# Patient Record
Sex: FEMALE | Race: WHITE | NOT HISPANIC OR LATINO | Employment: UNEMPLOYED | URBAN - METROPOLITAN AREA
[De-identification: names, ages, dates, MRNs, and addresses within clinical notes are randomized per-mention and may not be internally consistent; named-entity substitution may affect disease eponyms.]

---

## 2017-01-04 ENCOUNTER — ALLSCRIPTS OFFICE VISIT (OUTPATIENT)
Dept: OTHER | Facility: OTHER | Age: 47
End: 2017-01-04

## 2017-01-09 ENCOUNTER — ALLSCRIPTS OFFICE VISIT (OUTPATIENT)
Dept: OTHER | Facility: OTHER | Age: 47
End: 2017-01-09

## 2017-01-09 ENCOUNTER — TRANSCRIBE ORDERS (OUTPATIENT)
Dept: ADMINISTRATIVE | Facility: HOSPITAL | Age: 47
End: 2017-01-09

## 2017-01-09 DIAGNOSIS — R26.89 BALANCE PROBLEM: Primary | ICD-10-CM

## 2017-01-17 ENCOUNTER — GENERIC CONVERSION - ENCOUNTER (OUTPATIENT)
Dept: OTHER | Facility: OTHER | Age: 47
End: 2017-01-17

## 2017-01-27 ENCOUNTER — GENERIC CONVERSION - ENCOUNTER (OUTPATIENT)
Dept: OTHER | Facility: OTHER | Age: 47
End: 2017-01-27

## 2017-01-27 ENCOUNTER — HOSPITAL ENCOUNTER (OUTPATIENT)
Dept: MRI IMAGING | Facility: HOSPITAL | Age: 47
Discharge: HOME | End: 2017-01-27
Attending: PSYCHIATRY & NEUROLOGY
Payer: COMMERCIAL

## 2017-01-27 DIAGNOSIS — R26.89 OTHER ABNORMALITIES OF GAIT AND MOBILITY: ICD-10-CM

## 2017-01-27 PROCEDURE — 72156 MRI NECK SPINE W/O & W/DYE: CPT

## 2017-01-27 PROCEDURE — 72157 MRI CHEST SPINE W/O & W/DYE: CPT

## 2017-01-27 PROCEDURE — A9585 GADOBUTROL INJECTION: HCPCS | Performed by: PSYCHIATRY & NEUROLOGY

## 2017-01-27 RX ADMIN — GADOBUTROL 8 ML: 604.72 INJECTION INTRAVENOUS at 11:26

## 2017-02-10 ENCOUNTER — GENERIC CONVERSION - ENCOUNTER (OUTPATIENT)
Dept: OTHER | Facility: OTHER | Age: 47
End: 2017-02-10

## 2017-02-21 ENCOUNTER — GENERIC CONVERSION - ENCOUNTER (OUTPATIENT)
Dept: OTHER | Facility: OTHER | Age: 47
End: 2017-02-21

## 2017-02-22 DIAGNOSIS — R26.89 OTHER ABNORMALITIES OF GAIT AND MOBILITY: ICD-10-CM

## 2017-02-22 DIAGNOSIS — G43.709 CHRONIC MIGRAINE WITHOUT AURA WITHOUT STATUS MIGRAINOSUS, NOT INTRACTABLE: ICD-10-CM

## 2017-03-09 ENCOUNTER — ALLSCRIPTS OFFICE VISIT (OUTPATIENT)
Dept: OTHER | Facility: OTHER | Age: 47
End: 2017-03-09

## 2017-03-15 ENCOUNTER — ALLSCRIPTS OFFICE VISIT (OUTPATIENT)
Dept: OTHER | Facility: OTHER | Age: 47
End: 2017-03-15

## 2017-03-16 ENCOUNTER — ALLSCRIPTS OFFICE VISIT (OUTPATIENT)
Dept: OTHER | Facility: OTHER | Age: 47
End: 2017-03-16

## 2017-03-16 ENCOUNTER — GENERIC CONVERSION - ENCOUNTER (OUTPATIENT)
Dept: OTHER | Facility: OTHER | Age: 47
End: 2017-03-16

## 2017-03-17 LAB
ERYTHROCYTE SEDIMENTATION RATE (HISTORICAL): 18 MM/HR (ref 0–32)
Lab: 15.2 AU/ML (ref 0–1.1)

## 2017-03-20 ENCOUNTER — GENERIC CONVERSION - ENCOUNTER (OUTPATIENT)
Dept: OTHER | Facility: OTHER | Age: 47
End: 2017-03-20

## 2017-04-14 ENCOUNTER — ALLSCRIPTS OFFICE VISIT (OUTPATIENT)
Dept: OTHER | Facility: OTHER | Age: 47
End: 2017-04-14

## 2017-04-14 DIAGNOSIS — Z12.31 ENCOUNTER FOR SCREENING MAMMOGRAM FOR MALIGNANT NEOPLASM OF BREAST: ICD-10-CM

## 2017-04-20 ENCOUNTER — ALLSCRIPTS OFFICE VISIT (OUTPATIENT)
Dept: OTHER | Facility: OTHER | Age: 47
End: 2017-04-20

## 2017-05-10 ENCOUNTER — GENERIC CONVERSION - ENCOUNTER (OUTPATIENT)
Dept: OTHER | Facility: OTHER | Age: 47
End: 2017-05-10

## 2017-05-19 ENCOUNTER — ALLSCRIPTS OFFICE VISIT (OUTPATIENT)
Dept: OTHER | Facility: OTHER | Age: 47
End: 2017-05-19

## 2017-07-11 ENCOUNTER — TRANSCRIBE ORDERS (OUTPATIENT)
Dept: ADMINISTRATIVE | Facility: HOSPITAL | Age: 47
End: 2017-07-11

## 2017-07-11 ENCOUNTER — ALLSCRIPTS OFFICE VISIT (OUTPATIENT)
Dept: OTHER | Facility: OTHER | Age: 47
End: 2017-07-11

## 2017-07-11 DIAGNOSIS — R90.89 ABNORMAL COMPUTERIZED TOMOGRAPHY OF BRAIN: Primary | ICD-10-CM

## 2017-07-11 DIAGNOSIS — R90.89 OTHER ABNORMAL FINDINGS ON DIAGNOSTIC IMAGING OF CENTRAL NERVOUS SYSTEM: ICD-10-CM

## 2017-07-13 ENCOUNTER — GENERIC CONVERSION - ENCOUNTER (OUTPATIENT)
Dept: OTHER | Facility: OTHER | Age: 47
End: 2017-07-13

## 2017-07-13 LAB
BUN SERPL-MCNC: 8 MG/DL (ref 6–24)
BUN/CREA RATIO (HISTORICAL): 11 (ref 9–23)
CREAT SERPL-MCNC: 0.73 MG/DL (ref 0.57–1)
EGFR AFRICAN AMERICAN (HISTORICAL): 114 ML/MIN/1.73
EGFR-AMERICAN CALC (HISTORICAL): 99 ML/MIN/1.73

## 2017-07-14 ENCOUNTER — GENERIC CONVERSION - ENCOUNTER (OUTPATIENT)
Dept: OTHER | Facility: OTHER | Age: 47
End: 2017-07-14

## 2017-07-14 LAB
A/G RATIO (HISTORICAL): 1.4 (ref 0.7–1.7)
ALBUMIN SERPL BCP-MCNC: 3.8 G/DL (ref 2.9–4.4)
ALPHA 1 (HISTORICAL): 0.3 G/DL (ref 0–0.4)
ALPHA 2 (HISTORICAL): 0.8 G/DL (ref 0.4–1)
BACTERIA UR QL AUTO: NORMAL
BETA-2 MICROGLOBULIN (HISTORICAL): 0.9 G/DL (ref 0.7–1.3)
BILIRUB UR QL STRIP: NEGATIVE
COLOR UR: YELLOW
COMMENT (HISTORICAL): CLEAR
FECAL OCCULT BLOOD DIAGNOSTIC (HISTORICAL): NEGATIVE
GAMMA GLOBULIN (HISTORICAL): 0.9 G/DL (ref 0.4–1.8)
GAMMAGLOBULIN; IGM (HISTORICAL): 51 MG/DL (ref 26–217)
GLUCOSE (HISTORICAL): NEGATIVE
IGA (HISTORICAL): 113 MG/DL (ref 87–352)
IMMUNOFIXATION ELECTROPHORESIS (HISTORICAL): NORMAL
KETONES UR STRIP-MCNC: NEGATIVE MG/DL
LEUKOCYTE ESTERASE UR QL STRIP: NEGATIVE
M-SPIKE, SERUM (HISTORICAL): NORMAL G/DL
MICROSCOPIC EXAMINATION (HISTORICAL): NORMAL
MICROSCOPIC EXAMINATION (HISTORICAL): NORMAL
NITRITE UR QL STRIP: NEGATIVE
NON-SQ EPI CELLS URNS QL MICRO: NORMAL /HPF
PH UR STRIP.AUTO: 7 [PH] (ref 5–7.5)
PLEASE NOTE: (HISTORICAL): NORMAL
PROT UR STRIP-MCNC: NEGATIVE MG/DL
RBC (HISTORICAL): NORMAL /HPF
SP GR UR STRIP.AUTO: 1.01 (ref 1–1.03)
TOT. GLOBULIN, SERUM (HISTORICAL): 2.9 G/DL (ref 2.2–3.9)
TOTAL IGG (HISTORICAL): 892 MG/DL (ref 700–1600)
TOTAL PROTEIN (HISTORICAL): 6.7 G/DL (ref 6–8.5)
URINALYSIS (UA) (HISTORICAL): NORMAL
UROBILINOGEN UR QL STRIP.AUTO: 0.2 EU/DL (ref 0.2–1)
WBC # BLD AUTO: NORMAL /HPF

## 2017-07-16 ENCOUNTER — GENERIC CONVERSION - ENCOUNTER (OUTPATIENT)
Dept: OTHER | Facility: OTHER | Age: 47
End: 2017-07-16

## 2017-07-24 ENCOUNTER — GENERIC CONVERSION - ENCOUNTER (OUTPATIENT)
Dept: OTHER | Facility: OTHER | Age: 47
End: 2017-07-24

## 2017-07-26 ENCOUNTER — HOSPITAL ENCOUNTER (OUTPATIENT)
Dept: MRI IMAGING | Facility: HOSPITAL | Age: 47
Discharge: HOME | End: 2017-07-26
Attending: PSYCHIATRY & NEUROLOGY
Payer: COMMERCIAL

## 2017-07-26 DIAGNOSIS — R90.89 OTHER ABNORMAL FINDINGS ON DIAGNOSTIC IMAGING OF CENTRAL NERVOUS SYSTEM: ICD-10-CM

## 2017-07-26 PROCEDURE — A9585 GADOBUTROL INJECTION: HCPCS | Performed by: PSYCHIATRY & NEUROLOGY

## 2017-07-26 PROCEDURE — 70553 MRI BRAIN STEM W/O & W/DYE: CPT

## 2017-07-26 RX ADMIN — GADOBUTROL 9 ML: 604.72 INJECTION INTRAVENOUS at 08:40

## 2017-07-27 ENCOUNTER — GENERIC CONVERSION - ENCOUNTER (OUTPATIENT)
Dept: OTHER | Facility: OTHER | Age: 47
End: 2017-07-27

## 2017-12-05 ENCOUNTER — GENERIC CONVERSION - ENCOUNTER (OUTPATIENT)
Dept: OTHER | Facility: OTHER | Age: 47
End: 2017-12-05

## 2017-12-26 ENCOUNTER — ALLSCRIPTS OFFICE VISIT (OUTPATIENT)
Dept: OTHER | Facility: OTHER | Age: 47
End: 2017-12-26

## 2017-12-26 ENCOUNTER — TRANSCRIBE ORDERS (OUTPATIENT)
Dept: ADMINISTRATIVE | Facility: HOSPITAL | Age: 47
End: 2017-12-26

## 2017-12-26 DIAGNOSIS — H53.8 OTHER VISUAL DISTURBANCES (CODE): ICD-10-CM

## 2017-12-26 DIAGNOSIS — T88.7XXA DRUG-INDUCED DISORDER OF REFRACTION AND ACCOMMODATION: ICD-10-CM

## 2017-12-26 DIAGNOSIS — R29.898 DEFICIENCIES OF LIMBS: Primary | ICD-10-CM

## 2017-12-26 DIAGNOSIS — H53.8 DRUG-INDUCED DISORDER OF REFRACTION AND ACCOMMODATION: ICD-10-CM

## 2017-12-27 NOTE — PROGRESS NOTES
Assessment   1  Bilateral leg weakness (729 89) (R29 898)  2  Migraine without aura and without status migrainosus, not intractable (346 10) (G43 009)  3  Visual blurriness (368 8) (H53 8)  4  Lumbar spondylosis (721 3) (M47 816)  5  Rheumatoid arthritis (714 0) (M06 9)  6  Pernicious anemia (281 0) (D51 0)    Plan   Bilateral leg weakness    · EMG TWO EXTREMITIES WITH OR W/O RELATED PARASPINAL AREAS; Status:Active; Requested for:40Vfv2529;   Perform:[facility]; Due:97Cni1186; Last Updated By:Barbara Adhikari;     12/26/2017 8:43:16 AM;Ordered; For:Bilateral leg weakness; Ordered By:Miriam Harris;  EXTREMITY TWO : RLE   EXTREMITY ONE : LLE  Visual blurriness    · Start: Butalbital-APAP-Caffeine -40 MG Oral Capsule; TAKE 1 CAPSULE EVERY 4    HOURS AS NEEDED  Rx By: Lana Gold; Dispense: 30 Days ; #:12 Capsule; Refill: 1;For: Visual     blurriness; DONNA = N; Faxed To: [facility]/PHARMACY #[id number]  · Start: Propranolol HCl - 10 MG Oral Tablet; TAKE 1 TABLET TWICE DAILY  Rx By: Lana Gold; Dispense: 30 Days ; #:60 Tablet; Refill: 3;For: Visual     blurriness; DONNA = N; Faxed To: [facility]/PHARMACY #[id number]   · * MRI BRAIN MS WO AND W CONTRAST; Status:Need Information - Financial    Authorization; Requested for:[id number];   Perform:[facility]; Order Comments:JULY 2018; WXU:65TYE5056; Last     Updated By:Barbara Adhikari; 12/26/2017 8:42:18 AM;Ordered; For:Visual blurriness;     Ordered By:Miriam Harris;   · VAS TEMPORAL ARTERY DUPLEX; SIDE:Bilateral; Status:Active; Requested    for:99Cak0056;   Perform:[facility]; Due:96Vnl3144; Last Updated By:Barbara Adhikari;     12/26/2017 8:44:45 AM;Ordered; For:Visual blurriness; Ordered By:Christen Harris;   · 1 - Willis Peralta Physician Assistant Co-Management  1-2 MONTHS     Status: Active  Requested for: 44EEN3481  Ordered; For: Visual blurriness;  Ordered By: Lana Gold  Performed:   Due: 03WYK6429; Last Updated By: Tanisha Che; 12/26/2017 8:42:44 AM  () Care Summary provided  : Yes   · Follow-up Visit in 8 Months Evaluation and Treatment  Follow-up  Status: Complete     Done: 74JWW7772  Ordered; For: Visual blurriness;  Ordered By: Yunier Rizvi  Performed:   Due:     86VAR8329; Last Updated By: Tanisha Che; 12/26/2017 8:44:09 AM    Discussion/Summary   Mrs Radha Smith has presented for follow up on worsening headaches, lower extremities weakness, word finding difficulties  Patient has started prednisone therapy for RA and she has been completing work up for Sjogren's disease, with known pernicious anemia and Factor V Leiden  Patient complaint about generalized fatigue- due to multiple autoimmune disorders  In regards of non-specific white matter findings - patient has MRI brain in July 2017 - no new findings, no demyelination in her C/T spine either  We will have one more MRI in July 2018 with no other tests recommended  Patient has worsening headaches with known history of Migraines- will restart propranolol, and provide Fioricet for abortive therapy  Patient had allergic reaction  to effexor previously  Follow up with Ms Cordon recommended  We will proceed with Vascular study for temporal artery - transient vision blurriness and swelling of left temporal area- would rule out GCA, but no permanent vision loss  Patient has been currently on prednisone  Lower extremities weakness on and off with advanced lumbar arthritis - patient will have EMG study, as she has been following with  pain management and rhizotomy was recommended as her next step  Follow up in July 2018 after imaging completed or earlier if new focal neurologic deficit described      Counseling Documentation With Imm: Greater than 50% of the 40 minutes evaluation was a face-to-face discussion regarding  the pathophysiology of her current symptoms and further plan, as well as counseling, educating, and coordinating the patientâs care  Chief Complaint   Chief Complaint Free Text Note Form: Patient is here today for a follow up to an abnormal [facility] MRI      History of Present Illness   Mrs Anderson Lozoya has a history of 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, Difficulty swallowing, left cervical radiculopathy, left shoulder replacement, lumbar spondylosis, Factor V Leiden syndrome, Factor VIII deficiency, Fever of unknown origin, Pernicious anemia, RA, atrophic gastritis, who presented for Follow-up On her body twitching, fatigue and transient lower extremities weakness  Patient presented with her   Since last office visit, patient described no new focal neurologic deficit  She stated that her headaches returned, no aura, but she has transient left temporal pain and swelling  At times patient described double vision or blurriness  Patient has been started prednisone 20 mg daily for multiple autoimmune pathologies  She has ongoing work up for questionable Sjogren's disease  Patient has transient lower extremities weakness with bladder dysfunction - we agreed that she has no signs of MS and no lesions in her thoracic spine to explain her findings  She is to follow with her pain management team and she started that they would decide if further imaging recommended, as per the patient  Review of Systems   Neurological ROS:      Constitutional:1  recent weight gain1 -- and-- fatigue1   HEENT:1  blurred vision1 ,-- dryness of the eyes1 ,-- hearing loss1 ,-- tinnitus1 -- and-- dysphagia1   Cardiovascular:1  rapid or irregular heart rate1   Respiratory:1   No unusual or persistant cough, no shortness of breath with or without exertion1   Gastrointestinal:1  nausea1 ,-- vomiting1 -- and-- abdominal pain1   Genitourinary:1  feelings of urinary urgency1 -- and-- UTI1         Musculoskeletal:1  arthralgias1 ,-- WWTLWIAT0 ,-- immobility or loss of function1 ,-- head/neck/back pain1 -- and-- pain while walking1   Integumentary1   No masses, no rash, no skin lesions, no livedo reticularis1   Psychiatric:1  anxiety1   Endocrine1   No unusual weight loss or gain, no excessive urination, no excessive thirst, no hair loss or gain, no hot or cold intolerance, no menstrual period change or irregularity, no loss of sexual ability or drive, no erection difficulty, no nipple discharge1   Hematologic/Lymphatic: unusual bleeding1 -- and-- a tendency for easy bruising1   Neurological General:1  headache1 ,-- trouble falling asleep1 ,-- snoring1 -- and-- waking up at night1   Neurological Mental Status:1  confusion1 -- and-- memory problems1   Neurological Cranial Nerves:1  slurred speech1   Neurological Motor findings include:1  twitching1   Neurological Coordination:1  balance difficulties1 -- and-- clumsiness1   Neurological Sensory:1  numbness1 -- and-- tingling1   Neurological Gait:1   No difficulty walking, not falling to one side, no sensation of being pushed, has not had falls1   ROS Reviewed:    ROS reviewed  1 Amended By: Collette Sofia; Dec 26 2017 10:37 AM EST      Active Problems   1  Abnormal brain MRI (793 0) (R90 89)  2  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)  3  Balance disorder (781 99) (R26 89)  4  Chronic atrophic gastritis (535 10) (K29 40)  5  Chronic atrophic gastritis (535 10) (K29 40)  6  Chronic migraine without aura (346 70) (G43 709)  7  Depression (311) (F32 9)  8  Easy bruisability (782 9) (R23 8)  9  Encounter for gynecological examination with abnormal finding (V72 31) (Z01 411)  10  Encounter for gynecological examination without abnormal finding (V72 31) (Z01 419)  11  Encounter for routine gynecological examination (V72 31) (Z01 419)  12  Encounter for screening mammogram for malignant neoplasm of breast (V76 12)      (Z12 31)  13   Factor V Leiden mutation (289 81) (D68 51)  14  Frontal lobe and executive function deficit (799 55) (R41 844)  15  Mild cognitive impairment (331 83) (G31 84)  16  Osteoarthritis of spine with radiculopathy, cervical region (721 0) (M47 22)  17  Ovarian cyst (620 2) (N83 20)  18  Paresthesia of both lower extremities (782 0) (R20 2)  19  Pelvic and perineal pain (625 9) (R10 2)  20  Platelet dysfunction (287 1) (D69 1)  21  Posttraumatic stress disorder (309 81) (F43 10)  22  Vitamin B12 deficiency (266 2) (E53 8)  23  Vulvovaginitis candida albicans (112 1) (B37 3)    Past Medical History   1  History of Avascular necrosis (733 40) (M87 00)  2  Depression (311) (F32 9)  3  History of menorrhagia (V13 29) (Z87 42)  4  History of migraine (V12 49) (Z86 69)  5  History of peripheral neuropathy (V12 49) (Z86 69)  6  History of Lichen sclerosus (990 3) (L90 0)  7  Ovarian cyst (620 2) (N83 20)  8  History of Pelvic pain (R10 2)  9  History of Periodic limb movement disorder (327 51) (G47 61)  10  Personal history of endometriosis (V13 29) (Z87 42)  Active Problems And Past Medical History Reviewed: The active problems and past medical history were reviewed and updated today  Surgical History   1  History of Appendectomy  2  History of Biopsy Endometrial, Without Cervical Dilation  3  History of  Section  4  History of Dilation And Curettage  5  History of Exploratory Laparoscopy  6  History of Hysterectomy  7  History of Hysteroscopy  8  History of Ovarian Cystectomy  9  History of Salpingectomy  10  History of Tubal Ligation  Surgical History Reviewed: The surgical history was reviewed and updated today  Family History   Mother   1  Family history of arthritis (V17 7) (Z82 61)  2  Family history of diabetes mellitus (V18 0) (Z83 3)  3  Family history of factor V Leiden deficiency (V18 3) (Z83 2)  4  Family history of migraine headaches (V17 2) (Z82 0)  5  Family history of non-Hodgkin's lymphoma (V16 7) (Z80 7)  6  Family history of thyroid disease (V18 19) (Z83 49)  7  Family history of Hypertension, benign  8  Family history of Rheumatic disease  Father   5  Family history of Motor vehicle accident  Brother   8  Family history of Rheumatic disease  Family History Reviewed: The family history was reviewed and updated today  Social History    · Current Every Day Smoker (305 1)  Social History Reviewed: The social history was reviewed and updated today  The social history was reviewed and is unchanged  Current Meds   1  Chantix 1 MG Oral Tablet; TAKE 1 TABLET TWICE DAILY; Therapy: (Recorded:28Sea0139) to Recorded  2  Nucynta 100 MG Oral Tablet; 1 tablet prn; Therapy: (Recorded:25Gjo1722) to Recorded  3  PredniSONE 20 MG Oral Tablet; TAKE 1 TABLET TWICE DAILY; Therapy: (Recorded:41Aph8069) to Recorded  4  TraZODone HCl - 100 MG Oral Tablet; 1 TABLET 3 TIMES A DAY; Therapy: (Recorded:86Gqn3324) to Recorded  Medication List Reviewed: The medication list was reviewed and updated today  Allergies   1  Morphine Sulfate TABS  2  Macrobid CAPS  3  Adhesive Tape TAPE    Physical Exam   CONSTITUTIONAL: NAD, pleasant  NECK: supple, no lymphadenopathy, no thyromegaly, no JVD  CARDIOVASCULAR: RRR, normal S1S2, no murmurs, no rubs  RESP: clear to auscultation bilaterally, no wheezes/rhonchi/rales  ABDOMEN: soft, non tender, non distended  SKIN: no rash or skin lesions  EXTREMITIES: no edema, pulses 2+bilaterally  PSYCH: appropriate mood and affect     NEUROLOGIC COMPREHENSIVE EXAM: Patient is oriented to person, place and time, NAD; appropriate affect  CN II, III, IV, V, VI, VII,VIII,IX,X,XI-XII intact with EOMI, PERRLA, OKN intact, VF grossly intact, fundi poorly visualized secondary to pupillary constriction; symmetric face noted  Motor: 5/5 UE/LE bilateral symmetric; Sensory: intact to light touch and pinprick bilaterally; normal vibration sensation feet bilaterally;  Coordination within normal limits on FTN and MARSHA testing; DTR: 2/4 through, no Babinski, no clonus  Tandem gait is intact  Romberg: negative           Future Appointments      Date/Time Provider Specialty Site   03/08/2018 11:15 AM Dianna Dyer [facility] 176     Signatures    Electronically signed by : KARIS Brice ; Dec 26 2017  8:48AM EST                       (Author)     Electronically signed by : KARIS Brice ; Dec 26 2017 11:02AM EST                       (Author)

## 2018-01-09 NOTE — RESULT NOTES
Verified Results  (1) FERRITIN [id number] 01:46PM Cierra Perez     Test Name Result Flag Reference   Ferritin, Serum 85 ng/mL       (1) IRON SATURATION %, TIBC [id number] 01:46PM Cierra Perez     Test Name Result Flag Reference   Iron Bind  Cap (TIBC) 276 ug/dL  250-450   UIBC 168 ug/dL  131-425   Iron, Serum 108 ug/dL     Iron Saturation 39 %  15-55     (LC) PT and PTT [id number] 01:46PM Cierra Perez     Test Name Result Flag Reference   INR 1 0  0 8-1 2   Reference interval is for non-anticoagulated patients  Suggested INR therapeutic range for Vitamin K                 antagonist therapy:                    Standard Dose (moderate intensity                                   therapeutic range):       2 0 - 3 0                    Higher intensity therapeutic range       2 5 - 3 5   Prothrombin Time 10 3 sec  9 1-12 0   aPTT 31 sec  24-33   This test has not been validated for monitoring unfractionated heparin  therapy  aPTT-based therapeutic ranges for unfractionated heparin  therapy have not been established  For general guidelines on  Heparin monitoring, refer to the [employer]  (1) CBC/PLT/DIFF [id number] 01:46PM Colonel Burkett     Test Name Result Flag Reference   WBC 9 2 x10E3/uL  3 4-10 8   RBC 4 77 x10E6/uL  3 77-5 28   Hemoglobin 15 1 g/dL  11 1-15 9   Hematocrit 43 8 %  34 0-46  6   MCV 92 fL  79-97   MCH 31 7 pg  26 6-33 0   MCHC 34 5 g/dL  31 5-35 7   RDW 12 9 %  12 3-15 4   Platelets 012 U01A2/SW  150-379   Neutrophils 58 %     Lymphs 36 %     Monocytes 4 %     Eos 2 %     Basos 0 %     Neutrophils (Absolute) 5 2 x10E3/uL  1 4-7 0   Lymphs (Absolute) 3 3 x10E3/uL H 0 7-3 1   Monocytes(Absolute) 0 4 x10E3/uL  0 1-0 9   Eos (Absolute) 0 2 x10E3/uL  0 0-0 4   Baso (Absolute) 0 0 x10E3/uL  0 0-0 2   Immature Granulocytes 0 %     Immature Grans (Abs) 0 0 x10E3/uL  0 0-0 1     (1) COMPREHENSIVE METABOLIC PANEL [id number] 01:46PM Shaneka Smiley April     Test Name Result Flag Reference   Glucose, Serum 105 mg/dL H 65-99   BUN 7 mg/dL  6-24   Creatinine, Serum 0 74 mg/dL  0 57-1 00   eGFR If NonAfricn Am 98 mL/min/1 73  >59   eGFR If Africn Am 113 mL/min/1 73  >59   BUN/Creatinine Ratio 9  9-23   Sodium, Serum 139 mmol/L  134-144   **Please note reference interval change**   Potassium, Serum 4 0 mmol/L  3 5-5 2   Chloride, Serum 101 mmol/L     **Please note reference interval change**   Carbon Dioxide, Total 20 mmol/L  18-29   Calcium, Serum 9 1 mg/dL  8 7-10 2   Protein, Total, Serum 6 5 g/dL  6 0-8 5   Albumin, Serum 4 3 g/dL  3 5-5 5   Globulin, Total 2 2 g/dL  1 5-4 5   A/G Ratio 2 0  1 1-2 5   Bilirubin, Total <0 2 mg/dL  0 0-1 2   Alkaline Phosphatase, S 81 IU/L     AST (SGOT) 14 IU/L  0-40   ALT (SGPT) 15 IU/L  0-32     (LC) Fibrinogen Antigen [id number] 01:46PM Cierra Smiley     Test Name Result Flag Reference   Fibrinogen Antigen 426 mg/dL H 180-350

## 2018-01-10 ENCOUNTER — GENERIC CONVERSION - ENCOUNTER (OUTPATIENT)
Dept: OTHER | Facility: OTHER | Age: 48
End: 2018-01-10

## 2018-01-10 ENCOUNTER — HOSPITAL ENCOUNTER (OUTPATIENT)
Dept: NON INVASIVE DIAGNOSTICS | Facility: CLINIC | Age: 48
Discharge: HOME | End: 2018-01-10
Payer: COMMERCIAL

## 2018-01-10 DIAGNOSIS — H53.8 OTHER VISUAL DISTURBANCES (CODE): ICD-10-CM

## 2018-01-10 PROCEDURE — 93882 EXTRACRANIAL UNI/LTD STUDY: CPT

## 2018-01-10 NOTE — MISCELLANEOUS
Message   Recorded as Task   Date: 11/10/2016 08:50 AM, Created By: Joaquín Jones   Task Name: Care Coordination   Assigned To: Modesto Rodríguez   Regarding Patient: Bucky De La Cruz, Status: In Progress   Kaylah Ohs - 10 Nov 2016 8:50 AM     TASK CREATED  Caller: Self; Care Coordination; [phone] (Home); [phone] (Work)  Pt saw her pain management doctor and they would like to give her an injection in her back  She was called to check with Dr Trupti Mcclain about whether or not she would need to have a PLT transfusion first  Please call her to discuss  [facility]: [phone]   Ania Jorgensen - 10 Nov 2016 8:57 AM     TASK IN PROGRESS   Ania Jorgensen - 10 Nov 2016 12:27 PM     TASK EDITED  d/w dr Caleb Clark  a platelet transfusion will be needed prior to injection  patient will let pain cneter know        Active Problems    1  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)   2  Depression (311) (F32 9)   3  Easy bruisability (782 9) (R23 8)   4  Encounter for gynecological examination with abnormal finding (V72 31) (Z01 411)   5  Encounter for routine gynecological examination (V72 31) (Z01 419)   6  Encounter for screening mammogram for malignant neoplasm of breast (V76 12)   (Z12 31)   7  Factor V Leiden mutation (289 81) (D68 51)   8  Ovarian cyst (620 2) (N83 20)   9  Pelvic and perineal pain (625 9) (R10 2)   10  Platelet dysfunction (287 1) (D69 1)   11  Vulvovaginitis candida albicans (112 1) (B37 3)    Current Meds   1  CeleBREX 200 MG Oral Capsule (Celecoxib); TAKE 1 CAPSULE DAILY; Therapy: (Recorded:25Mar2016) to Recorded   2  Effexor 37 5 MG TABS (Venlafaxine HCl); TAKE 1 TABLET DAILY; Therapy: (Recorded:25Mar2016) to Recorded   3  Nucynta 50 MG Oral Tablet; Therapy: (Recorded:25Mar2016) to Recorded   4  Wellbutrin  MG Oral Tablet Extended Release 12 Hour (BuPROPion HCl ER   (SR)); TAKE 1 TABLET DAILY; Therapy: (Recorded:25Mar2016) to Recorded    Allergies    1  Morphine Sulfate TABS   2  Macrobid CAPS   3   Adhesive Tape TAPE    Signatures   Electronically signed by : Francois Kahn, ; Nov 10 2016 12:27PM EST                       (Author)

## 2018-01-11 ENCOUNTER — TRANSCRIBE ORDERS (OUTPATIENT)
Dept: ADMINISTRATIVE | Facility: HOSPITAL | Age: 48
End: 2018-01-11

## 2018-01-11 DIAGNOSIS — R16.0 HEPATOMEGALY: Primary | ICD-10-CM

## 2018-01-11 NOTE — RESULT NOTES
Verified Results  (LC) Intrinsic Factor Abs, Serum [id number] 01:42PM Nicholas Thomson     Test Name Result Flag Reference   Intrinsic Factor Abs, Serum 15 2 AU/mL H 0 0-1 1

## 2018-01-11 NOTE — RESULT NOTES
Verified Results  * MRI CERVICAL SPINE [address] [id number] 10:03AM Fernando Sanchez Order Number: [id number]    - Patient Instructions: To schedule this appointment, please contact Central Scheduling at [phone]  Test Name Result Flag Reference   MRI CERVICAL SPINE [address] (Report)     This is a summary report  The complete report is available in the patient's medical record  If you cannot access the medical record, please contact the sending organization for a detailed fax or copy  MRI CERVICAL SPINE WITH AND WITHOUT CONTRAST     INDICATION: Gait abnormality  COMPARISON: Prior MRI July 11, 2004     TECHNIQUE: Sagittal T1, sagittal T2, sagittal inversion recovery, axial 2D merge and axial T2  Sagittal T1 and axial T1 postcontrast     IV Contrast: gadobutrol injection (MULTI-DOSE) SOLN 8 mL Note: (SINGLE DOSE/MULTI DOSE) information refers to the container from which the contrast was acquired  Contrast was injected one time intravenously without immediate complication  IMAGE QUALITY: Diagnostic  FINDINGS:     ALIGNMENT: Status post ACDF spanning C4-C6 with typical postoperative alignment  MARROW SIGNAL: Normal marrow signal is identified within the visualized bony structures  No discrete marrow lesion  CERVICAL AND VISUALIZED UPPER THORACIC CORD: Normal signal within the visualized cord  PREVERTEBRAL AND PARASPINAL SOFT TISSUES: Prevertebral and paraspinal soft tissues are unremarkable  VISUALIZED POSTERIOR FOSSA: The visualized posterior fossa demonstrates no abnormal signal      CERVICAL DISC SPACES:        C2-C3: Normal      C3-C4: Disc desiccation  Annular bulging with small marginal osteophytes results in minimal left foraminal narrowing, and minimal central stenosis without cord compression or cord signal abnormality  C4-C5: This level has been fused  No central or foraminal narrowing  C5-C6: This level has been fused   No central or foraminal narrowing  C6-C7: Small marginal osteophytes and minimal annular bulging result in minimal left foraminal narrowing  C7-T1: Normal      UPPER THORACIC DISC SPACES: Normal      POSTCONTRAST IMAGING: Normal        IMPRESSION:     Normal appearance of the cervical spinal cord  No abnormal enhancement identified  Typical postoperative appearance of ACDF spanning C4-C6  Degenerative changes at C4-5 noted where there is minimal central and left foraminal narrowing, as well as at    C6-7 where there is minimal left foraminal narrowing  Workstation performed: [id number]     Signed by:   Mary 6, DO   1/27/17     * MRI THORACIC SPINE W WO CONTRAST [id number] 10:03AM Melba Malik Order Number: [id number]    - Patient Instructions: To schedule this appointment, please contact Central Scheduling at [phone]  Test Name Result Flag Reference   MRI THORACIC SPINE [address] (Report)     This is a summary report  The complete report is available in the patient's medical record  If you cannot access the medical record, please contact the sending organization for a detailed fax or copy  MRI THORACIC SPINE WITH AND WITHOUT CONTRAST     INDICATION: Gait abnormality  Follow-up abnormal brain MRI  COMPARISON: None  TECHNIQUE: Sagittal T1, sagittal T2, sagittal inversion recovery, axial T2, coronal T2, axial 2D MERGE  Sagittal and axial T1 postcontrast      IV Contrast: gadobutrol injection (MULTI-DOSE) SOLN 8 mL Note: (SINGLE DOSE/MULTI DOSE) information refers to the container from which the contrast was acquired  Contrast was injected one time intravenously without immediate complication  IMAGE QUALITY: Diagnostic  FINDINGS:     ALIGNMENT: Levoscoliosis apex T3-4  MARROW SIGNAL: Normal marrow signal is identified within the visualized bony structures  No discrete marrow lesion  THORACIC CORD: Normal signal within the thoracic cord  PREVERTEBRAL AND PARASPINAL SOFT TISSUES: Prevertebral and paraspinal soft tissues are unremarkable  THORACIC DEGENERATIVE CHANGE: No disc herniation, canal stenosis or foraminal narrowing  No degenerative changes  POSTCONTRAST: No abnormal enhancement  IMPRESSION:     Normal appearance of the thoracic spinal cord  No significant central or foraminal narrowing         Workstation performed: [id number]     Signed by:   Ron Moreno DO   1/27/17

## 2018-01-12 VITALS
OXYGEN SATURATION: 97 % | HEIGHT: 67 IN | RESPIRATION RATE: 16 BRPM | SYSTOLIC BLOOD PRESSURE: 122 MMHG | HEART RATE: 79 BPM | DIASTOLIC BLOOD PRESSURE: 80 MMHG | WEIGHT: 194.38 LBS | TEMPERATURE: 98.2 F | BODY MASS INDEX: 30.51 KG/M2

## 2018-01-12 VITALS
RESPIRATION RATE: 16 BRPM | DIASTOLIC BLOOD PRESSURE: 80 MMHG | BODY MASS INDEX: 30.61 KG/M2 | HEART RATE: 107 BPM | WEIGHT: 195 LBS | HEIGHT: 67 IN | OXYGEN SATURATION: 98 % | SYSTOLIC BLOOD PRESSURE: 130 MMHG | TEMPERATURE: 96.1 F

## 2018-01-12 VITALS
HEIGHT: 67 IN | SYSTOLIC BLOOD PRESSURE: 114 MMHG | WEIGHT: 204.56 LBS | BODY MASS INDEX: 32.11 KG/M2 | DIASTOLIC BLOOD PRESSURE: 60 MMHG

## 2018-01-12 NOTE — MISCELLANEOUS
Message  Pt called and requested her records be sent to Dr Santosh Alva at [phone] (Fax)  Records faxed  Active Problems    1  Abnormal brain MRI (793 0) (R90 89)   2  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)   3  Balance disorder (781 99) (R26 89)   4  Chronic atrophic gastritis (535 10) (K29 40)   5  Chronic atrophic gastritis (535 10) (K29 40)   6  Chronic migraine without aura (346 70) (G43 709)   7  Depression (311) (F32 9)   8  Easy bruisability (782 9) (R23 8)   9  Encounter for gynecological examination with abnormal finding (V72 31) (Z01 411)   10  Encounter for gynecological examination without abnormal finding (V72 31) (Z01 419)   11  Encounter for routine gynecological examination (V72 31) (Z01 419)   12  Encounter for screening mammogram for malignant neoplasm of breast (V76 12)    (Z12 31)   13  Factor V Leiden mutation (289 81) (D68 51)   14  Frontal lobe and executive function deficit (799 55) (R41 844)   15  Mild cognitive impairment (331 83) (G31 84)   16  Osteoarthritis of spine with radiculopathy, cervical region (721 0) (M47 22)   17  Ovarian cyst (620 2) (N83 20)   18  Paresthesia of both lower extremities (782 0) (R20 2)   19  Pelvic and perineal pain (625 9) (R10 2)   20  Platelet dysfunction (287 1) (D69 1)   21  Posttraumatic stress disorder (309 81) (F43 10)   22  Vitamin B12 deficiency (266 2) (E53 8)   23  Vulvovaginitis candida albicans (112 1) (B37 3)    Current Meds   1  Carafate SUSP; Therapy: (Recorded:93Okl5661) to Recorded   2  Propranolol HCl - 10 MG Oral Tablet; 1 tab qhs x 5 days, then 1 bid; Therapy: 32EVY9610 to (Evaluate:14Apr2017)  Requested for: 39AOU7385; Last   Rx:15Mar2017 Ordered   3  Reglan 10 MG Oral Tablet (Metoclopramide HCl); TAKE 1 TABLET 4 TIMES DAILY,   BEFORE MEALS AND AT BEDTIME; Therapy: (Recorded:31Qyo9308) to Recorded    Allergies    1  Morphine Sulfate TABS   2  Macrobid CAPS   3   Adhesive Tape TAPE    Signatures   Electronically signed by Saroj Harrison, ; Jul 24 2017  2:34PM EST                       (Author)

## 2018-01-13 VITALS
DIASTOLIC BLOOD PRESSURE: 70 MMHG | SYSTOLIC BLOOD PRESSURE: 114 MMHG | HEIGHT: 67 IN | BODY MASS INDEX: 31.08 KG/M2 | WEIGHT: 198 LBS

## 2018-01-13 VITALS
SYSTOLIC BLOOD PRESSURE: 125 MMHG | DIASTOLIC BLOOD PRESSURE: 58 MMHG | BODY MASS INDEX: 30.64 KG/M2 | WEIGHT: 195.25 LBS | RESPIRATION RATE: 16 BRPM | HEART RATE: 80 BPM | HEIGHT: 67 IN

## 2018-01-13 VITALS
SYSTOLIC BLOOD PRESSURE: 120 MMHG | BODY MASS INDEX: 30.54 KG/M2 | WEIGHT: 195 LBS | HEART RATE: 107 BPM | DIASTOLIC BLOOD PRESSURE: 72 MMHG | OXYGEN SATURATION: 98 %

## 2018-01-13 NOTE — RESULT NOTES
Verified Results  * MRI BRAIN MS WO AND W CONTRAST [id number] 07:50AM Isabel Alexis Order Number: [id number]    - Patient Instructions: To schedule this appointment, please contact Central Scheduling at [phone]  Test Name Result Flag Reference   MRI BRAIN MS WO AND W CONTRAST (Report)     This is a summary report  The complete report is available in the patient's medical record  If you cannot access the medical record, please contact the sending organization for a detailed fax or copy  MRI BRAIN WITH AND WITHOUT CONTRAST     INDICATION: Follow-up to prior  Headaches slurred speech, balance issues, and nausea  COMPARISON: Outside MRI December 23, 2016     TECHNIQUE: Sagittal T1, axial T2, axial FLAIR, axial T1  Shepherd, Sagittal FLAIR CUBE   Axial and sagittal [id number]            IV Contrast: 9 mL of Gadobutrol injection (SINGLE-DOSE)      IMAGE QUALITY: Diagnostic  FINDINGS:     BRAIN PARENCHYMA: A few scattered stable nonspecific white matter foci are seen  There is no discrete mass, mass effect or midline shift  Brainstem and cerebellum demonstrate normal signal  There is no intracranial hemorrhage  There is no evidence of acute infarction  Postcontrast imaging of the brain demonstrates no abnormal enhancement  VENTRICLES: Normal      SELLA AND PITUITARY GLAND: Normal      ORBITS: Normal      PARANASAL SINUSES: Normal      VASCULATURE: Evaluation of the major intracranial vasculature demonstrates appropriate flow voids  CALVARIUM AND SKULL BASE: Normal      EXTRACRANIAL SOFT TISSUES: Normal        IMPRESSION:     A few nonspecific white matter foci are seen which are similar to the prior study and are within the realm of normal for patient's this age group  There are no lesions within the periventricular regions or posterior fossa  Demyelinating disease should    only be considered in the right clinical setting         Workstation performed: [id number] Signed by:   Carrol Salcedo,    7/26/17

## 2018-01-13 NOTE — RESULT NOTES
Verified Results  (1) [facility] [id number] 01:46PM Dameon Montenegro     Test Name Result Flag Reference   FSH 51 4 mIU/mL     Follicular phase        3 5 -  12 5                                     Ovulation phase         4 7 -  21 5                                     Luteal phase            1 7 -   7 7                                     Postmenopausal         25 8 - 134 8

## 2018-01-15 NOTE — MISCELLANEOUS
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)   2  Depression (311) (F32 9)   3  Easy bruisability (782 9) (R23 8)   4  Encounter for gynecological examination with abnormal finding (V72 31) (Z01 411)   5  Encounter for routine gynecological examination (V72 31) (Z01 419)   6  Encounter for screening mammogram for malignant neoplasm of breast (V76 12)   (Z12 31)   7  Factor V Leiden mutation (289 81) (D68 51)   8  Ovarian cyst (620 2) (N83 20)   9  Pelvic and perineal pain (625 9) (R10 2)   10  Platelet dysfunction (287 1) (D69 1)   11  Vulvovaginitis candida albicans (112 1) (B37 3)    Current Meds   1  CeleBREX 200 MG Oral Capsule (Celecoxib); TAKE 1 CAPSULE DAILY; Therapy: (Recorded:25Mar2016) to Recorded   2  Effexor 37 5 MG TABS (Venlafaxine HCl); TAKE 1 TABLET DAILY; Therapy: (Recorded:25Mar2016) to Recorded   3  Nucynta 50 MG Oral Tablet; Therapy: (Recorded:25Mar2016) to Recorded   4  Wellbutrin  MG Oral Tablet Extended Release 12 Hour (BuPROPion HCl ER   (SR)); TAKE 1 TABLET DAILY; Therapy: (Recorded:25Mar2016) to Recorded    Allergies    1  Morphine Sulfate TABS   2  Macrobid CAPS   3   Adhesive Tape TAPE    Signatures   Electronically signed by : Suze Bermudez, ; Nov 21 2016 11:29AM EST                       (Author)

## 2018-01-16 NOTE — RESULT NOTES
Verified Results  (LC) SHERITA and PE, Serum [id number] 10:55AM Ankita Marcos     Test Name Result Flag Reference   Immunoglobulin G, Qn, Serum 892 mg/dL  700-1600   Immunoglobulin A, Qn, Serum 113 mg/dL     Immunoglobulin M, Qn, Serum 51 mg/dL     Protein, Total, Serum 6 7 g/dL  6 0-8 5   Albumin 3 8 g/dL  2 9-4 4   Alpha-1-Globulin 0 3 g/dL  0 0-0 4   Alpha-2-Globulin 0 8 g/dL  0 4-1 0   Beta Globulin 0 9 g/dL  0 7-1 3   Gamma Globulin 0 9 g/dL  0 4-1 8   M-Weston Not Observed g/dL  Not Observed   Globulin, Total 2 9 g/dL  2 2-3 9   A/G Ratio 1 4  0 7-1 7   Immunofixation Result, Serum Comment     An apparent normal immunofixation pattern  Please note: Comment     Protein electrophoresis scan will follow via computer, mail, or   delivery

## 2018-01-16 NOTE — MISCELLANEOUS
Message   Recorded as Task   Date: 12/13/2016 10:02 AM, Created By: Susan Alva   Task Name: Call Back   Assigned To: Ania Jorgensen   Regarding Patient: Wilfredo Hill, Status: Active   Comment:    Marcy Sylvester - 13 Dec 2016 10:02 AM     TASK CREATED  Caller: Self; Other; [phone] (Home)  CORBY STATED SHE KNICKED HERSELF SHAVING ON SUNDAY AND IT CONTINUED TO BLEED A LITTLE WITHOUT SCABBING UNTIL THIS A M   SHOULD THIS BE ANY NEED FOR HER CONCERN? 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)   2  Depression (311) (F32 9)   3  Easy bruisability (782 9) (R23 8)   4  Encounter for gynecological examination with abnormal finding (V72 31) (Z01 411)   5  Encounter for routine gynecological examination (V72 31) (Z01 419)   6  Encounter for screening mammogram for malignant neoplasm of breast (V76 12)   (Z12 31)   7  Factor V Leiden mutation (289 81) (D68 51)   8  Ovarian cyst (620 2) (N83 20)   9  Pelvic and perineal pain (625 9) (R10 2)   10  Platelet dysfunction (287 1) (D69 1)   11  Vulvovaginitis candida albicans (112 1) (B37 3)    Current Meds   1  CeleBREX 200 MG Oral Capsule (Celecoxib); TAKE 1 CAPSULE DAILY; Therapy: (Recorded:25Mar2016) to Recorded   2  Effexor 37 5 MG TABS (Venlafaxine HCl); TAKE 1 TABLET DAILY; Therapy: (Recorded:25Mar2016) to Recorded   3  Nucynta 50 MG Oral Tablet; Therapy: (Recorded:25Mar2016) to Recorded   4  Wellbutrin  MG Oral Tablet Extended Release 12 Hour (BuPROPion HCl ER   (SR)); TAKE 1 TABLET DAILY; Therapy: (Recorded:25Mar2016) to Recorded    Allergies    1  Morphine Sulfate TABS   2  Macrobid CAPS   3   Adhesive Tape TAPE    Signatures   Electronically signed by : Walter Noriega, ; Dec 13 2016 10:32AM EST (Author)

## 2018-01-16 NOTE — MISCELLANEOUS
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)   2  Depression (311) (F32 9)   3  Easy bruisability (782 9) (R23 8)   4  Encounter for gynecological examination with abnormal finding (V72 31) (Z01 411)   5  Encounter for routine gynecological examination (V72 31) (Z01 419)   6  Encounter for screening mammogram for malignant neoplasm of breast (V76 12)   (Z12 31)   7  Factor V Leiden mutation (289 81) (D68 51)   8  Ovarian cyst (620 2) (N83 20)   9  Pelvic and perineal pain (625 9) (R10 2)   10  Platelet dysfunction (287 1) (D69 1)   11  Vulvovaginitis candida albicans (112 1) (B37 3)    Current Meds   1  CeleBREX 200 MG Oral Capsule (Celecoxib); TAKE 1 CAPSULE DAILY; Therapy: (Recorded:25Mar2016) to Recorded   2  Effexor 37 5 MG TABS (Venlafaxine HCl); TAKE 1 TABLET DAILY; Therapy: (Recorded:25Mar2016) to Recorded   3  Nucynta 50 MG Oral Tablet; Therapy: (Recorded:25Mar2016) to Recorded   4  Wellbutrin  MG Oral Tablet Extended Release 12 Hour (BuPROPion HCl ER   (SR)); TAKE 1 TABLET DAILY; Therapy: (Recorded:25Mar2016) to Recorded    Allergies    1  Morphine Sulfate TABS   2  Macrobid CAPS   3   Adhesive Tape TAPE    Signatures   Electronically signed by : Juliet Lama, ; Dec 28 2016 10:30AM EST                       (Author)

## 2018-01-16 NOTE — PROGRESS NOTES
Assessment    1  Cognitive impairment (294 9) (R41 89)   2  Factor V Leiden mutation (289 81) (D68 51)   3  Vitamin B12 deficiency (266 2) (E53 8)    Plan   Cognitive impairment    · Follow-up visit in 4 Months Evaluation and Treatment  Follow-up  Status: Complete   Done: 60ICD2034   Ordered; For: Cognitive impairment; Ordered By: Elziabeth Sainz Performed:  Due: 39DTQ4634; Last Updated By: Stanislav Hill; 3/9/2017 2:11:44 PM   · 1 - Annemarie Dodge PSYD Bone  Neuropsychology Physician Referral  Consult Only: the  expectation is that the referring provider will communicate back to the patient on  treatment options  Evaluation and Treatment: the expectation is that the referred to  provider will communicate back to the patient on treatment options  Status: Active   Requested for: 31BNL7026   Ordered; For: Cognitive impairment; Ordered By: Elizabeth Sainz Performed:  Due: 58DCJ4542; Last Updated By: Stanislav Hill; 3/9/2017 2:11:44 PM  Care Summary provided  : Yes  Vitamin B12 deficiency    · Syringe Avitene External Miscellaneous; please provide 16 syringes of 2-3 ml, with  23 chaya needle   Rx By: Elizabeth Sainz; Dispense: 180 Days ; #:16 GM; Refill: 0; For: Vitamin B12 deficiency; DONNA = N; Faxed To: [id number]  · (LC) Intrinsic Factor Abs, Serum; Status:Active; Requested LRP:[id number];    Perform:[facility]; CYA:84PVI1426; Ordered; For:Vitamin B12 deficiency; Ordered By:Miriam Harris;    Cyanocobalamin 1000 MCG/ML Injection Solution; Vitamin B12 1000 mcg IM injection    si mcg once a day for 7 days, once a week for 4 weeks, once a month 5 months   Dispo: 16 ml;   Therapy: 95ZLG9808 to (Evaluate:11Vhw2192); Last Rx:2017; Status: ACTIVE Ordered  Rx By: Elizabeth Sainz; Dispense: 180 Days ; #:16 ML;  Refill: 0;   For: Vitamin B12 deficiency; DONNA = N; Faxed To: [id number]     Discussion/Summary    Mrs Jolynn Go has presented for follow up On her body tremors fatigue and mild cognitive impairment  We reviewed her imaging with no signs of demyelinating disorder of central nervous system appreciated, no spinal tap required at this point  Dr Guera Baez evaluated the patient with OCT she has no optic nerve atrophy, suggesting against neurodegeneration  Her extensive bloodwork was consistent with underlying autoimmune disorder as she has positive microsomal antibodies with normal function of the thyroid  Patient also was noted to have vitamin B12 deficiency with a level of 275, requiring cyanocobalamin 1000 Âµg injections daily for 7 days, weekly for 7 weeks, monthly for 5 months  I also provided bloodwork for intrinsic factor  Patient has multiple hematological disorders including microcytic anemia, Factor V Leiden with thrombocytopenia  I also agree that her clinical presentation is likely due to her underlying chronic inflammatory process along with hematologic findings as patient had 2 year period of fever of unknown origin with significant lymphadenopathy and granulomatous disease should be excluded by infectious disorder team   Patient bloodwork was negative for ANCA, SPEP, paraneoplastic panel, negative double-stranded DNA as well as beta-2 glycoprotein, normal copper end ceruloplasmin, vitamin D level was 12 4 with no vitamin D deficiency, negative cryoglobulin  Patient will be further evaluated by Dr Jose Ramon Jimenez with formal neuropsychological testing and based on his findings will adjust her management  Patient will follow with pain management and [facility] for cervical and lumbar spondylosis  She would benefit from balance therapy  Patient has by temporal headaches with radiation to the vertex on and off with no visual changes- she would benefit from headache center evaluation for potential trigger point injections or Botox injections  Chief Complaint  Chief Complaint Free Text Note Form: Patient presents for neurology follow-up for abnormal brain MRI  History of Present Illness  Mrs Kierra Dooley Has a history of 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, Difficulty swallowing, left cervical radiculopathy, left shoulder replacement, lumbar spondylosis, Factor V Leiden syndrome, Factor VIII deficiency, Fever of unknown origin, who presented for Follow-up On her body twitching, fatigue, and transient confusional states  Patient has mostly sensory disturbances in her upper or lower extremity with a disbalance for many years  She had cervical AC DF spanning C4 - C6 done many years ago with post procedural changes appreciated  Patient describes difficulty swallowing incense Since that time  We personally reviewed her imaging including brain MRI with no demyelinating disease appreciated  brain parenchyma or spinal cord  Patient has no new symptoms since her last visit and she is here today to review imaging and her bloodwork  Review of Systems  ROS Reviewed:   ROS reviewed  Active Problems    1  Abnormal brain MRI (793 0) (R90 89)   2  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)   3  Balance disorder (781 99) (R26 89)   4  Cognitive impairment (294 9) (R41 89)   5  Depression (311) (F32 9)   6  Easy bruisability (782 9) (R23 8)   7  Encounter for gynecological examination with abnormal finding (V72 31) (Z01 411)   8  Encounter for routine gynecological examination (V72 31) (Z01 419)   9  Encounter for screening mammogram for malignant neoplasm of breast (V76 12)   (Z12 31)   10  Factor V Leiden mutation (289 81) (D68 51)   11  Osteoarthritis of spine with radiculopathy, cervical region (721 0) (M47 22)   12  Ovarian cyst (620 2) (N83 20)   13  Paresthesia of both lower extremities (782 0) (R20 2)   14  Pelvic and perineal pain (625 9) (R10 2)   15  Platelet dysfunction (287 1) (D69 1)   16  Vulvovaginitis candida albicans (112 1) (B37 3)    Past Medical History    1  History of Avascular necrosis (733 40) (M87 00)   2   Depression (311) (F32 9)   3  History of menorrhagia (V13 29) (Z87 42)   4  History of migraine (V12 49) (Z86 69)   5  History of peripheral neuropathy (V12 49) (Z86 69)   6  History of Lichen sclerosus (150 1) (L90 0)   7  Ovarian cyst (620 2) (N83 20)   8  History of Pelvic pain (R10 2)   9  History of Periodic limb movement disorder (327 51) (G47 61)   10  Personal history of endometriosis (V13 29) (Z87 42)  Active Problems And Past Medical History Reviewed: The active problems and past medical history were reviewed and updated today  Surgical History    1  History of Appendectomy   2  History of Biopsy Endometrial, Without Cervical Dilation   3  History of  Section   4  History of Dilation And Curettage   5  History of Exploratory Laparoscopy   6  History of Hysterectomy   7  History of Hysteroscopy   8  History of Ovarian Cystectomy   9  History of Salpingectomy   10  History of Tubal Ligation  Surgical History Reviewed: The surgical history was reviewed and updated today  Family History  Mother    1  Family history of arthritis (V17 7) (Z82 61)   2  Family history of diabetes mellitus (V18 0) (Z83 3)   3  Family history of factor V Leiden deficiency (V18 3) (Z83 2)   4  Family history of migraine headaches (V17 2) (Z82 0)   5  Family history of non-Hodgkin's lymphoma (V16 7) (Z80 7)   6  Family history of thyroid disease (V18 19) (Z83 49)   7  Family history of Hypertension, benign   8  Family history of Rheumatic disease  Father    5  Family history of Motor vehicle accident  Brother    8  Family history of Rheumatic disease  Family History Reviewed: The family history was reviewed and updated today  Social History    · Current Every Day Smoker (305 1)  Social History Reviewed: The social history was reviewed and updated today  The social history was reviewed and is unchanged  Current Meds   1  CeleBREX 200 MG Oral Capsule; TAKE 1 CAPSULE DAILY;    Therapy: (Recorded:2016) to Recorded   2  Gabapentin 600 MG Oral Tablet; TAKE 1 TABLET 3 TIMES DAILY; Therapy: (Recorded:99Wyf6595) to Recorded   3  Lexapro 20 MG Oral Tablet; TAKE 1 TABLET DAILY; Therapy: (JEFQDRMI:28KVM3544) to Recorded   4  Nucynta 100 MG Oral Tablet; TAKE 1 TABLET 3 to 4 TIMES DAILY; Therapy: (HQCPNMPT:25AEX2017) to Recorded  Medication List Reviewed: The medication list was reviewed and updated today  Allergies    1  Morphine Sulfate TABS   2  Macrobid CAPS   3  Adhesive Tape TAPE    Vitals  Signs   Recorded: 66NNN2878 12:57PM   Heart Rate: 80  Respiration: 16  Systolic: 610  Diastolic: 60  Height: 5 ft 6 5 in  Weight: 197 lb 3 04 oz  BMI Calculated: 31 35  BSA Calculated: 2    Physical Exam  CONSTITUTIONAL: NAD, pleasant  NECK: supple, no lymphadenopathy, no thyromegaly, no JVD  CARDIOVASCULAR: RRR, normal S1S2, no murmurs, no rubs  RESP: clear to auscultation bilaterally, no wheezes/rhonchi/rales  ABDOMEN: soft, non tender, non distended  SKIN: no rash or skin lesions  EXTREMITIES: no edema, pulses 2+bilaterally  PSYCH: appropriate mood and affect  NEUROLOGIC COMPREHENSIVE EXAM: Patient is oriented to person, place and time, NAD; appropriate affect  CN II, III, IV, V, VI, VII,VIII,IX,X,XI-XII intact with EOMI, PERRLA, OKN intact, VF grossly intact with blurriness in OD nasal part, fundi poorly visualized secondary to pupillary constriction; symmetric face noted  Motor: 5/5 UE/LE bilateral symmetric, with 4/5 left shoulder abduction, and biceps; Sensory: intact to light touch and pinprick bilaterally; right lower extremity decrease vibration sensation; Coordination within normal limits on FTN and MARSHA testing; DTR: 2/4 through, no Babinski, but clonus bilaterally  Tandem gait is abnormal  Romberg: negative  Provider Comments  Report generated utilizing voice recognition technology  Therefore, there may be syntax, spelling, and/or grammatical errors  Please call if you have any questions  Future Appointments    Date/Time Provider Specialty Site   04/20/2017 10:00 AM Niurka Uribe  Neurology [address]   03/31/2017 01:00 PM Dasha Morrell DO Obstetrics/Gynecology [facility] OB[facility]N [facility] AND SURGICAL [facility]   03/14/2017 01:30 PM Emile Hull, [facility] Neurology [facility] NEUROLOGY [facility]   07/11/2017 11:00 AM KARIS Hester   Neurology [address] [facility]     Signatures   Electronically signed by : KARIS Iglesias ; Mar 12 2017  4:04PM EST                       (Author)

## 2018-01-17 NOTE — RESULT NOTES
Verified Results  (LC) UA/M w/rflx Culture, Routine [id number] 10:57AM Tennille Mercer     Test Name Result Flag Reference   Specific Gravity 1 007  1 005-1 030   pH 7 0  5 0-7 5   Urine-Color Yellow  Yellow   Appearance Clear  Clear   WBC Esterase Negative  Negative   Protein Negative  Negative/Trace   Glucose Negative  Negative   Ketones Negative  Negative   Occult Blood Negative  Negative   Bilirubin Negative  Negative   Urobilinogen,Semi-Qn 0 2 EU/dL  0 2-1 0   Nitrite, Urine Negative  Negative   Microscopic Examination Comment     Microscopic follows if indicated  Microscopic Examination See below:     Urinalysis Reflex Comment     This specimen will not reflex to a Urine Culture     WBC 0-5 /hpf  0 -  5   RBC None seen /hpf  0 -  2   Epithelial Cells (non renal) 0-10 /hpf  0 - 10   Bacteria None seen  None seen/Few     (LC) BUN+Creat 68VIV2018 10:55AM Tennille Mercer     Test Name Result Flag Reference   BUN 8 mg/dL  6-24   Creatinine, Serum 0 73 mg/dL  0 57-1 00   BUN/Creatinine Ratio 11  9-23   eGFR If NonAfricn Am 99 mL/min/1 73  >59   eGFR If Africn Am 114 mL/min/1 73  >59

## 2018-01-18 ENCOUNTER — GENERIC CONVERSION - ENCOUNTER (OUTPATIENT)
Dept: OTHER | Facility: OTHER | Age: 48
End: 2018-01-18

## 2018-01-18 ENCOUNTER — HOSPITAL ENCOUNTER (OUTPATIENT)
Dept: RADIOLOGY | Facility: HOSPITAL | Age: 48
Discharge: HOME | End: 2018-01-18
Payer: COMMERCIAL

## 2018-01-18 ENCOUNTER — HOSPITAL ENCOUNTER (OUTPATIENT)
Dept: ULTRASOUND IMAGING | Facility: HOSPITAL | Age: 48
Discharge: HOME | End: 2018-01-18
Payer: COMMERCIAL

## 2018-01-18 ENCOUNTER — TRANSCRIBE ORDERS (OUTPATIENT)
Dept: ADMINISTRATIVE | Facility: HOSPITAL | Age: 48
End: 2018-01-18

## 2018-01-18 DIAGNOSIS — M06.4 INFLAMMATORY POLYARTHROPATHY (HCC): ICD-10-CM

## 2018-01-18 DIAGNOSIS — R16.0 HEPATOMEGALY: ICD-10-CM

## 2018-01-18 DIAGNOSIS — M06.4 INFLAMMATORY POLYARTHROPATHY (HCC): Primary | ICD-10-CM

## 2018-01-18 PROCEDURE — 76700 US EXAM ABDOM COMPLETE: CPT

## 2018-01-18 PROCEDURE — 73130 X-RAY EXAM OF HAND: CPT

## 2018-01-18 PROCEDURE — 73630 X-RAY EXAM OF FOOT: CPT

## 2018-01-18 NOTE — MISCELLANEOUS
Message   Recorded as Task   Date: 06/01/2016 08:47 AM, Created By: Willow Christine   Task Name: Medical Complaint Callback   Assigned To: Molly Diallo   Regarding Patient: Breonna Ponce, Status: Active   Comment:    Marcy Sylvester - 01 Jun 2016 8:47 AM     TASK CREATED  Caller: Self; Medical Complaint; [phone] (Home)  PT HAD A SPINAL PROCEDURE 3 WKS AGO AND SHE IS COMPLAINING OF NOT FEELING ANY BETTER  ALSO, SHE HAS BRUISING ALL OVER AND A UTI  SHE HAS FACTOR 8, SHOULD SHE MAYBE GET PLTS? 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)   2  Depression (311) (F32 9)   3  Easy bruisability (782 9) (R23 8)   4  Encounter for gynecological examination with abnormal finding (V72 31) (Z01 411)   5  Encounter for routine gynecological examination (V72 31) (Z01 419)   6  Encounter for screening mammogram for malignant neoplasm of breast (V76 12)   (Z12 31)   7  Factor V Leiden mutation (289 81) (D68 51)   8  Ovarian cyst (620 2) (N83 20)   9  Pelvic and perineal pain (625 9) (R10 2)   10  Platelet dysfunction (287 1) (D69 1)   11  Vulvovaginitis candida albicans (112 1) (B37 3)    Current Meds   1  CeleBREX 200 MG Oral Capsule (Celecoxib); TAKE 1 CAPSULE DAILY; Therapy: (Recorded:25Mar2016) to Recorded   2  Effexor 37 5 MG TABS (Venlafaxine HCl); TAKE 1 TABLET DAILY; Therapy: (Recorded:25Mar2016) to Recorded   3  Nucynta 50 MG Oral Tablet; Therapy: (Recorded:25Mar2016) to Recorded   4  Wellbutrin  MG Oral Tablet Extended Release 12 Hour (BuPROPion HCl ER   (SR)); TAKE 1 TABLET DAILY;    Therapy: (Recorded:25Mar2016) to Recorded    Allergies    1  Morphine Sulfate TABS   2  Macrobid CAPS   3   Adhesive Tape TAPE    Signatures   Electronically signed by : Ernst Diaz, ; Jun 1 2016  9:06AM EST                       (Author)

## 2018-01-18 NOTE — MISCELLANEOUS
Message   Recorded as Task   Date: 11/21/2016 12:14 PM, Created By: Khushboo Vinson   Task Name: Call Back   Assigned To: Khushboo Vinson   Regarding Patient: Rebecca Syed, Status: Active   Comment:    Khushboo Vinson - 21 Nov 2016 12:14 PM     TASK CREATED  please set up patient for platelet transfusion on 11/28/16 after Eluterio Swan is preference but will do [facility] or [facility]  please let patient know she will need a type and screen on 11/25 at site where she is scheduled for transfusion  Thanks! !   Tea Vazquez - 21 Nov 2016 2:31 PM     TASK REPLIED TO: Previously Assigned To Tete Flores  Scheduled pt for her platelet transfusion on 11 28 16 at 2:30pm  Pt is aware of the previously mentioned appt & of going to the [facility] for a type and screen on 11 25 16   JameelAnia - 21 Nov 2016 2:35 PM     TASK EDITED        Active Problems    1  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)   2  Depression (311) (F32 9)   3  Easy bruisability (782 9) (R23 8)   4  Encounter for gynecological examination with abnormal finding (V72 31) (Z01 411)   5  Encounter for routine gynecological examination (V72 31) (Z01 419)   6  Encounter for screening mammogram for malignant neoplasm of breast (V76 12)   (Z12 31)   7  Factor V Leiden mutation (289 81) (D68 51)   8  Ovarian cyst (620 2) (N83 20)   9  Pelvic and perineal pain (625 9) (R10 2)   10  Platelet dysfunction (287 1) (D69 1)   11  Vulvovaginitis candida albicans (112 1) (B37 3)    Current Meds   1  CeleBREX 200 MG Oral Capsule (Celecoxib); TAKE 1 CAPSULE DAILY; Therapy: (Recorded:25Mar2016) to Recorded   2  Effexor 37 5 MG TABS (Venlafaxine HCl); TAKE 1 TABLET DAILY; Therapy: (Recorded:25Mar2016) to Recorded   3  Nucynta 50 MG Oral Tablet; Therapy: (Recorded:25Mar2016) to Recorded   4  Wellbutrin  MG Oral Tablet Extended Release 12 Hour (BuPROPion HCl ER   (SR)); TAKE 1 TABLET DAILY; Therapy: (Recorded:25Mar2016) to Recorded    Allergies    1   Morphine Sulfate TABS   2  Macrobid CAPS   3   Adhesive Tape TAPE    Signatures   Electronically signed by : Hari Pizarro, ; Nov 21 2016  2:35PM EST                       (Author)

## 2018-01-22 VITALS
HEIGHT: 67 IN | WEIGHT: 197.19 LBS | DIASTOLIC BLOOD PRESSURE: 60 MMHG | SYSTOLIC BLOOD PRESSURE: 108 MMHG | HEART RATE: 80 BPM | BODY MASS INDEX: 30.95 KG/M2 | RESPIRATION RATE: 16 BRPM

## 2018-01-22 VITALS
BODY MASS INDEX: 32.8 KG/M2 | OXYGEN SATURATION: 98 % | HEIGHT: 67 IN | SYSTOLIC BLOOD PRESSURE: 126 MMHG | RESPIRATION RATE: 18 BRPM | WEIGHT: 209 LBS | DIASTOLIC BLOOD PRESSURE: 82 MMHG | HEART RATE: 86 BPM

## 2018-01-23 NOTE — MISCELLANEOUS
Message   Recorded as Task   Date: 12/05/2017 03:23 PM, Created By: Stefano Barnett   Task Name: Call Back   Assigned To: Lexii Husseinwood   Regarding Patient: Allyssa Mcallister, Status: Active   Comment:    Dorothea Hylton - 05 Dec 2017 3:23 PM     TASK CREATED  pt called stating she needs some extensive dental work done and possibly 7 teeth extracted, does she need platelets before hand or any before care? please call her back at [address] Dec 2017 4:23 PM     TASK EDITED  no need for transfusion per dr Andreas Hahn note  patient informed        Active Problems    1  Abnormal brain MRI (793 0) (R90 89)   2  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)   3  Balance disorder (781 99) (R26 89)   4  Chronic atrophic gastritis (535 10) (K29 40)   5  Chronic atrophic gastritis (535 10) (K29 40)   6  Chronic migraine without aura (346 70) (G43 709)   7  Depression (311) (F32 9)   8  Easy bruisability (782 9) (R23 8)   9  Encounter for gynecological examination with abnormal finding (V72 31) (Z01 411)   10  Encounter for gynecological examination without abnormal finding (V72 31) (Z01 419)   11  Encounter for routine gynecological examination (V72 31) (Z01 419)   12  Encounter for screening mammogram for malignant neoplasm of breast (V76 12)    (Z12 31)   13  Factor V Leiden mutation (289 81) (D68 51)   14  Frontal lobe and executive function deficit (799 55) (R41 844)   15  Mild cognitive impairment (331 83) (G31 84)   16  Osteoarthritis of spine with radiculopathy, cervical region (721 0) (M47 22)   17  Ovarian cyst (620 2) (N83 20)   18  Paresthesia of both lower extremities (782 0) (R20 2)   19  Pelvic and perineal pain (625 9) (R10 2)   20  Platelet dysfunction (287 1) (D69 1)   21  Posttraumatic stress disorder (309 81) (F43 10)   22  Vitamin B12 deficiency (266 2) (E53 8)   23  Vulvovaginitis candida albicans (112 1) (B37 3)    Current Meds   1  Carafate SUSP; Therapy: (Recorded:67Wiz8201) to Recorded   2   Propranolol HCl - 10 MG Oral Tablet; 1 tab qhs x 5 days, then 1 bid; Therapy: [id number] to (Evaluate:14Apr2017)  Requested for: [id number]; Last   Rx:15Mar2017 Ordered   3  Reglan 10 MG Oral Tablet (Metoclopramide HCl); TAKE 1 TABLET 4 TIMES DAILY,   BEFORE MEALS AND AT BEDTIME; Therapy: (Recorded:47Aph2082) to Recorded    Allergies    1  Morphine Sulfate TABS   2  Macrobid CAPS   3   Adhesive Tape TAPE    Signatures   Electronically signed by : Lulu Sanchez, ; Dec  5 2017  4:24PM EST                       (Author)

## 2018-02-13 ENCOUNTER — HOSPITAL ENCOUNTER (OUTPATIENT)
Dept: NEUROLOGY | Facility: AMBULATORY SURGERY CENTER | Age: 48
Discharge: HOME | End: 2018-02-13
Payer: COMMERCIAL

## 2018-02-13 DIAGNOSIS — R20.0 NUMBNESS AND TINGLING: ICD-10-CM

## 2018-02-13 DIAGNOSIS — R29.898 DEFICIENCIES OF LIMBS: ICD-10-CM

## 2018-02-13 DIAGNOSIS — R20.2 NUMBNESS AND TINGLING: ICD-10-CM

## 2018-02-13 PROCEDURE — 95886 MUSC TEST DONE W/N TEST COMP: CPT | Performed by: PSYCHIATRY & NEUROLOGY

## 2018-02-13 PROCEDURE — 95910 NRV CNDJ TEST 7-8 STUDIES: CPT | Performed by: PSYCHIATRY & NEUROLOGY

## 2018-02-26 NOTE — RESULT NOTES
Verified Results  VAS TEMPORAL ARTERY DUPLEX [id number] 07:50AM Delmon Callas Order Number: [id number]    - Patient Instructions: To schedule this appointment, please contact Central Scheduling at [phone]  Test Name Result Flag Reference   VAS TEMPORAL ARTERY DUPLEX (Report)     [facility] REPORT   CLINICAL:   Indications:   Patient presents with headaches and swelling in b/l temporal area accompanied   with blurred vision for the past year L > R  Patient c/o numbness and tingling   in bilateral arms/shoulders  Denies jaw claudication  There is no recent blood work  Patient is on Prednisone x 1 month with no   improvement in symptoms   Risk Factors   The patient has no history of obesity  The patient presents with headache, temporal tenderness and visual changes  FINDINGS:      Segment           Right           Left                              Impression   PSV EDV Impression   PSV EDV    Com  Carotid                 90  25          95  30    Ext Carotid                  50  12          60  16    Common Superficial Temporal          95  31          53  15    Middle Temporal Artery            74  19 Not Visualized         Frontal                    37  15          30  9    Parietal           Not Visualized               34  5             CONCLUSION:      Impression   RIGHT SIDE:   There is no evidence of giant cell arteritis, aneurysm, or significant stenosis   noted in the superficial temporal artery and its branches  LEFT SIDE:   There is no evidence of giant cell arteritis, aneurysm, or significant stenosis   noted in the superficial temporal artery and its branches  Note: Study was technically challenging due to patient hair and inability to   tolerate probe pressure        SIGNATURE:   Electronically Signed by: Cirilo Gomez on 2018-01-10 05:52:02 PM

## 2018-03-07 NOTE — PSYCH
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 psychological distress that may be contributing to her perceived diffuse cognitive concerns  The patient has been scheduled for a neuropsychological feedback session at which time we will review these findings, clinical impressions, and recommendations  Assessment    1  Chronic migraine without aura (346 70) (G43 709)   2  Cognitive impairment (294 9) (R41 89)   3  Depression (311) (F32 9)   4  Vitamin B12 deficiency (266 2) (E53 8)   5  Abnormal brain MRI (793 0) (R90 89)   6  Posttraumatic stress disorder (309 81) (F43 10)    Plan   1 - Al Dodge PSYD  Neuropsychology Co-Management  *  Status: Need Information - Financial Authorization  Requested for: [id number]  Ordered; For: Abnormal brain MRI, Chronic migraine without aura, Cognitive impairment, Depression, Posttraumatic stress disorder, Vitamin B12 deficiency; Ordered By: Willis Casper  Performed:   Due: 13QYS0523; Last Updated By: Orville Coleman; 4/24/2017 10:24:57 AM     Future Appointments    Date/Time Provider Specialty Site   05/19/2017 02:00 PM Niurka Lu  Neurology [facility] NEUROLOGY [facility]   07/11/2017 11:00 AM KARIS Rodriguez  Neurology [place] [address]     Signatures   Electronically signed by :  Niurka Conroy ; May 10 2017  9:04AM EST (Author)
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
## 2018-05-15 ENCOUNTER — TELEPHONE (OUTPATIENT)
Dept: NEUROLOGY | Facility: CLINIC | Age: 48
End: 2018-05-15

## 2018-07-16 ENCOUNTER — TELEPHONE (OUTPATIENT)
Dept: PSYCHOLOGY | Facility: CLINIC | Age: 48
End: 2018-07-16

## 2018-07-16 NOTE — TELEPHONE ENCOUNTER
[facility] Intake Assessment     Presenting Stressors: Patient struggles with PTSD having trouble with driving and focusing I feel out of controlled saw father get killed in a car accident years ago going thru divorce struggling suicidal ideation no plan younger son has brain tumor  Referral Source: Self   she is employed at No  Access to weapons? No  Is she a smoker? yes    Symptoms: suicidal ideation, depressed mood, anxiety and sleep distrubance poor appetite        Current Outpatient Prescriptions:     clonazePAM (KlonoPIN) 1 mg tablet, Take 1 mg by mouth daily as needed for seizures, Disp: , Rfl:     escitalopram (LEXAPRO) 10 mg tablet, Take 10 mg by mouth daily, Disp: , Rfl:     gabapentin (NEURONTIN) 100 mg capsule, Take 30 mg by mouth 3 (three) times a day, Disp: , Rfl:     tapentadol hcl er (NUCYNTA ER) 12 hr tablet, Take 150 mg by mouth every 12 (twelve) hours, Disp: , Rfl:     Medications: See Above    No Known Allergies    Allergies: NKA    Provisional Diagnosis:   Axis I:PTSD   Axis II: NONE    Substance Abuse:No concerns of substance abuse are reported      Psychiatric Treatment History:     Current psychiatrist: NONE  Therapist: NONE  Community Agency Supports: NO  The patient requires ambulatory assistance: NO    Legal Issues: NO LEGAL ISSUES    Action: NONE    ACCEPTED Appointment Date: NONE    DENIED Reason: NONE

## 2018-09-05 ENCOUNTER — HOSPITAL ENCOUNTER (OUTPATIENT)
Dept: RADIOLOGY | Facility: IMAGING CENTER | Age: 48
Discharge: HOME | End: 2018-09-05
Attending: PSYCHIATRY & NEUROLOGY
Payer: COMMERCIAL

## 2018-09-05 DIAGNOSIS — R29.898 DEFICIENCIES OF LIMBS: ICD-10-CM

## 2018-09-05 DIAGNOSIS — H53.8 DRUG-INDUCED DISORDER OF REFRACTION AND ACCOMMODATION: ICD-10-CM

## 2018-09-05 DIAGNOSIS — T88.7XXA DRUG-INDUCED DISORDER OF REFRACTION AND ACCOMMODATION: ICD-10-CM

## 2018-09-05 PROCEDURE — A9585 GADOBUTROL INJECTION: HCPCS | Performed by: PSYCHIATRY & NEUROLOGY

## 2018-09-05 PROCEDURE — 70553 MRI BRAIN STEM W/O & W/DYE: CPT

## 2018-09-05 RX ADMIN — GADOBUTROL 8 ML: 604.72 INJECTION INTRAVENOUS at 13:20

## 2018-09-06 ENCOUNTER — TELEPHONE (OUTPATIENT)
Dept: NEUROLOGY | Facility: CLINIC | Age: 48
End: 2018-09-06

## 2018-09-06 NOTE — TELEPHONE ENCOUNTER
Pt calls re MRI series results/recommendations  She just had last MRI in the series to be done yesterday    Please advise

## 2018-10-24 ENCOUNTER — TRANSCRIBE ORDERS (OUTPATIENT)
Dept: NEUROLOGY | Facility: CLINIC | Age: 48
End: 2018-10-24

## 2018-10-24 ENCOUNTER — OFFICE VISIT (OUTPATIENT)
Dept: NEUROLOGY | Facility: CLINIC | Age: 48
End: 2018-10-24
Payer: COMMERCIAL

## 2018-10-24 VITALS — HEART RATE: 86 BPM | SYSTOLIC BLOOD PRESSURE: 118 MMHG | DIASTOLIC BLOOD PRESSURE: 64 MMHG

## 2018-10-24 DIAGNOSIS — R13.10 DYSPHAGIA, UNSPECIFIED TYPE: ICD-10-CM

## 2018-10-24 DIAGNOSIS — R90.89 OTHER ABNORMAL FINDINGS ON DIAGNOSTIC IMAGING OF CENTRAL NERVOUS SYSTEM: Primary | ICD-10-CM

## 2018-10-24 DIAGNOSIS — M47.26 OTHER SPONDYLOSIS WITH RADICULOPATHY, LUMBAR REGION: ICD-10-CM

## 2018-10-24 DIAGNOSIS — R90.89 OTHER ABNORMAL FINDINGS ON DIAGNOSTIC IMAGING OF CENTRAL NERVOUS SYSTEM: ICD-10-CM

## 2018-10-24 DIAGNOSIS — R20.0 NUMBNESS IN BOTH LEGS: ICD-10-CM

## 2018-10-24 DIAGNOSIS — R29.898 WEAKNESS OF BOTH LOWER EXTREMITIES: Primary | ICD-10-CM

## 2018-10-24 PROCEDURE — 99215 OFFICE O/P EST HI 40 MIN: CPT | Performed by: PSYCHIATRY & NEUROLOGY

## 2018-10-24 RX ORDER — OMEPRAZOLE 10 MG/1
10 CAPSULE, DELAYED RELEASE ORAL AS NEEDED
COMMUNITY
End: 2020-12-16 | Stop reason: ALTCHOICE

## 2018-10-24 RX ORDER — HYDROXYCHLOROQUINE SULFATE 200 MG/1
200 TABLET, FILM COATED ORAL 2 TIMES DAILY WITH MEALS
COMMUNITY
End: 2021-06-09 | Stop reason: ALTCHOICE

## 2018-10-24 RX ORDER — BUTALBITAL, ACETAMINOPHEN AND CAFFEINE 300; 40; 50 MG/1; MG/1; MG/1
CAPSULE ORAL AS NEEDED
COMMUNITY
Start: 2016-12-30 | End: 2020-12-16 | Stop reason: ALTCHOICE

## 2018-10-24 RX ORDER — TAPENTADOL HYDROCHLORIDE 100 MG/1
100 TABLET, FILM COATED ORAL 4 TIMES DAILY PRN
Refills: 0 | COMMUNITY
Start: 2018-10-17 | End: 2020-12-16 | Stop reason: ALTCHOICE

## 2018-10-24 RX ORDER — SUCRALFATE ORAL 1 G/10ML
SUSPENSION ORAL AS NEEDED
COMMUNITY
End: 2020-12-16 | Stop reason: ALTCHOICE

## 2018-10-24 RX ORDER — CIPROFLOXACIN 500 MG/1
500 TABLET, FILM COATED ORAL EVERY 12 HOURS
Refills: 1 | COMMUNITY
Start: 2018-08-07 | End: 2018-11-07

## 2018-10-24 RX ORDER — METOCLOPRAMIDE 10 MG/1
10 TABLET ORAL AS NEEDED
COMMUNITY
End: 2020-12-16 | Stop reason: ALTCHOICE

## 2018-10-24 RX ORDER — METOPROLOL SUCCINATE 25 MG/1
25 TABLET, EXTENDED RELEASE ORAL DAILY
Refills: 3 | COMMUNITY
Start: 2018-10-08

## 2018-10-24 RX ORDER — LEFLUNOMIDE 20 MG/1
20 TABLET ORAL DAILY
COMMUNITY
Start: 2018-01-24 | End: 2021-06-09 | Stop reason: ALTCHOICE

## 2018-10-24 RX ORDER — TRAZODONE HYDROCHLORIDE 100 MG/1
100 TABLET ORAL
COMMUNITY
End: 2020-12-16 | Stop reason: ALTCHOICE

## 2018-10-24 RX ORDER — CYANOCOBALAMIN/FOLIC ACID 1MG-400MCG
TABLET, SUBLINGUAL SUBLINGUAL
COMMUNITY
Start: 2017-03-23

## 2018-10-24 NOTE — PROGRESS NOTES
Patient ID: Micah Smallwood is a 53 y o  female  Assessment/Plan:     Problem List Items Addressed This Visit        Digestive    Dysphagia       Nervous and Auditory    Weakness of both lower extremities - Primary    Relevant Orders    Ambulatory referral to Physical Therapy       Musculoskeletal and Integument    Other spondylosis with radiculopathy, lumbar region    Relevant Orders    Ambulatory referral to Physical Therapy       Other    Other abnormal findings on diagnostic imaging of central nervous system    Relevant Orders    Vitamin E    BUN    Creatinine, serum    MRI thoracic spine with and without contrast    MRI lumbar spine with and without contrast    Numbness in both legs    Relevant Orders    Ambulatory referral to Physical Therapy         Today I had the pleasure of seeing your patient, Micah Smallwood, in consultation at [facility]  Mrs Ike Root has presented for follow up on visual disturbances and balance issues  She has completed resolution of her visual disturbances  After she had started B12 injections for pernicious anemia  The only concern today was lower extremities weakness with known lumbar spondylosis- she has been working with Dr Loli Snowden at [address] [facility] [address] for that matter  Prior evaluation was negative for MS or other demyelinating processes  We agreed that we will consider MRI thoracic and lumbar spine in the light of worsening ambulatory function  She will start another session of PT for her lower extremities  MRI brain [id number] showed no significant changes, no signs of demyelination or ischemic changes  Patient is to follow with [facility] team for lower back issues  Follow up in 6 months for re-evaluation  Subjective: balance,  slurring and vision      HPI/History of Present Illness  Mrs Ike Root has a history of 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, Difficulty swallowing, left cervical radiculopathy, left shoulder replacement, lumbar spondylosis, Factor V Leiden syndrome, Factor VIII deficiency, Fever of unknown origin, Pernicious anemia, RA, atrophic gastritis, who presented for follow up on lower extremities weakness  Since last office visit, patient described some gradual progression of her dysfunction with more lower extremities weakness  Today Dr Everett Vazquez completed diagnostic facet injection and Rhizotomy will be considered if she has positive response  Patient had infectious/inflammatory  Process with low grade fevers and antibacterial therapy provided significant relieve in her dysfunction      Patient has bilateral lower extremities weakness right worse than left  Patient has known multilevel lumbar disease, with RA and multifocal joint pain  [facility] has been taking Leflunomide and Plaquenil  Patient has microscopic blood in urine, urgency has be getting worse Over the last 6 months  She had shown Subcutaneous vesicle which involving feet and hands, unclear etiology  Dysphagia has been an issue on and off may consider speech therapy  MRI 2016 of Lumbar spine: There remains degenerative disc disease and annular fissure at L4-5  There is also ligamentum flavum thickening and degenerative changes of the facets  produces some bilateral neural foraminal stenosis and some mild central canal stenosis at this level  MRI Brain 9/2018: A few scattered subcortical white matter nonenhancing FLAIR hyperintense foci, stable from 7/26/2017  This is nonspecific and does not demonstrate characteristic pattern seen in demyelinating disease  Correlate with clinical and other paraclinical   findings if demyelinating disease is concerned  At L5-S1 there are there are some inflammatory degenerative changes of the  right facet as discussed above  The following portions of the patient's history were reviewed and updated as appropriate:   She  has a past medical history of Atrophy of vagina; Avascular necrosis ([address] );  Depression; Easy bruisability; Endometriosis; Factor V Leiden mutation ([facility] [address] ); migraines; Lichen sclerosus; Ovarian cyst; Pelvic pain; Periodic limb movement disorder; and Peripheral neuropathy  She   Patient Active Problem List    Diagnosis Date Noted    Other abnormal findings on diagnostic imaging of central nervous system 10/24/2018    Weakness of both lower extremities 10/24/2018    Dysphagia 10/24/2018    Numbness in both legs 10/24/2018    Other spondylosis with radiculopathy, lumbar region 10/24/2018     She  has a past surgical history that includes Hysterectomy; Appendectomy;  section; and Laparoscopic ovarian cystectomy  Her family history is not on file  She  reports that she has been smoking  She has a 30 00 pack-year smoking history  She has never used smokeless tobacco  She reports that she does not drink alcohol or use drugs  Current Outpatient Prescriptions   Medication Sig Dispense Refill    Butalbital-APAP-Caffeine -40 MG CAPS as needed      ciprofloxacin (CIPRO) 500 mg tablet Take 500 mg by mouth every 12 (twelve) hours  1    Cobalamine Combinations (B-12) 1000-400 MCG SUBL       hydroxychloroquine (PLAQUENIL) 200 mg tablet Take 200 mg by mouth      leflunomide (ARAVA) 20 MG tablet       metoclopramide (REGLAN) 10 mg tablet Take 10 mg by mouth as needed      metoprolol succinate (TOPROL-XL) 25 mg 24 hr tablet Take 25 mg by mouth daily  3    NUCYNTA tablet 4 (four) times a day as needed  0    omeprazole (PriLOSEC) 10 mg delayed release capsule Take 10 mg by mouth as needed      sucralfate (CARAFATE) 1 g/10 mL suspension Take by mouth as needed      traZODone (DESYREL) 100 mg tablet Take 100 mg by mouth daily       No current facility-administered medications for this visit        Current Outpatient Prescriptions on File Prior to Visit   Medication Sig    [DISCONTINUED] clonazePAM (KlonoPIN) 1 mg tablet Take 1 mg by mouth daily as needed for seizures    [DISCONTINUED] escitalopram (LEXAPRO) 10 mg tablet Take 10 mg by mouth daily    [DISCONTINUED] gabapentin (NEURONTIN) 100 mg capsule Take 30 mg by mouth 3 (three) times a day    [DISCONTINUED] tapentadol hcl er (NUCYNTA ER) 12 hr tablet Take 150 mg by mouth every 12 (twelve) hours     No current facility-administered medications on file prior to visit  She is allergic to morphine and other            Objective:    Blood pressure 118/64, pulse 86  Physical Exam/Neurological Exam  CONSTITUTIONAL: NAD, pleasant  NECK: supple, no lymphadenopathy, no thyromegaly, no JVD  CARDIOVASCULAR: RRR, normal S1S2, no murmurs, no rubs  RESP: clear to auscultation bilaterally, no wheezes/rhonchi/rales  ABDOMEN: soft, non tender, non distended  SKIN: no rash or skin lesions  EXTREMITIES: no edema, pulses 2+bilaterally  PSYCH: appropriate mood and affect  NEUROLOGIC COMPREHENSIVE EXAM: Patient is oriented to person, place and time, NAD; appropriate affect  CN II, III, IV, V, VI, VII,VIII,IX,X,XI-XII intact with EOMI, PERRLA, OKN intact, VF grossly intact, fundi poorly visualized secondary to pupillary constriction; symmetric face noted  Motor: 5/5 UE/LE bilateral symmetric, 5-/5 hip flexion bilaterally; Sensory: decreased to light touch and pinprick dorsal part of her feet bilaterally; normal vibration sensation feet bilaterally; Coordination within normal limits on FTN and MARSHA testing; DTR: 2/4 through, no Babinski, no clonus  Tandem gait is mildly abnormal  Romberg: negative  ROS:  12 points of review of system was reviewed with the patient and was unremarkable with exception: see HPI  Review of Systems   Constitutional: Positive for appetite change and fever  HENT: Negative  Negative for hearing loss, tinnitus, trouble swallowing and voice change  Eyes: Negative  Negative for photophobia and pain  Respiratory: Negative  Negative for shortness of breath  Cardiovascular: Positive for palpitations     Gastrointestinal: Positive for nausea and vomiting  Endocrine: Negative  Negative for cold intolerance and heat intolerance  Genitourinary: Negative  Negative for dysuria, frequency and urgency  Musculoskeletal: Positive for myalgias and neck pain  Skin: Positive for rash  Neurological: Positive for speech difficulty, weakness (in legs) and headaches  Negative for dizziness, tremors, seizures, syncope, facial asymmetry, light-headedness and numbness  Hematological: Bruises/bleeds easily  Psychiatric/Behavioral: Positive for sleep disturbance  Negative for confusion and hallucinations

## 2018-11-02 ENCOUNTER — PREP FOR PROCEDURE (OUTPATIENT)
Dept: NEUROLOGY | Facility: CLINIC | Age: 48
End: 2018-11-02

## 2018-11-02 ENCOUNTER — TELEPHONE (OUTPATIENT)
Dept: NEUROLOGY | Facility: CLINIC | Age: 48
End: 2018-11-02

## 2018-11-02 DIAGNOSIS — G82.20 PARAPARESIS OF BOTH LOWER LIMBS (HCC): Primary | ICD-10-CM

## 2018-11-02 DIAGNOSIS — G82.20 PARAPARESIS (HCC): Primary | ICD-10-CM

## 2018-11-02 RX ORDER — LORAZEPAM 1 MG/1
TABLET ORAL
Qty: 30 TABLET | Refills: 0 | Status: SHIPPED | OUTPATIENT
Start: 2018-11-02 | End: 2019-04-25

## 2018-11-02 NOTE — TELEPHONE ENCOUNTER
[facility] Radiology team called on results of MRI T/L spine - patient has cauda equina central root enhancement with synovial fluid collection at L4-L5  Patient did not had any procedures done prior to her imaging  Lowe grade fever with progressing paraparesis has been reported for at least 2 months prior to her recent visit  Case discussed with the patient  Patient will proceed with LP- please help arranging her lumbar puncture  Patient will be having additional blood work - please mail it or fax to her preferred pharmacy

## 2018-11-05 ENCOUNTER — TELEPHONE (OUTPATIENT)
Dept: HEMATOLOGY ONCOLOGY | Facility: CLINIC | Age: 48
End: 2018-11-05

## 2018-11-05 DIAGNOSIS — Z01.812 PRE-PROCEDURE LAB EXAM: Primary | ICD-10-CM

## 2018-11-05 NOTE — TELEPHONE ENCOUNTER
Stated that she is getting a lumbar puncture on  Wed 11 7 18  She wants to know if Dr Andie Mendez thinks that she needs platelets before that appt  They did tell her that they will draw bw on 11 7 18 to make sure that she is ok for the procedure     Req that we call her back

## 2018-11-07 ENCOUNTER — HOSPITAL ENCOUNTER (OUTPATIENT)
Dept: RADIOLOGY | Facility: HOSPITAL | Age: 48
Discharge: HOME | End: 2018-11-07
Attending: PSYCHIATRY & NEUROLOGY | Admitting: PSYCHIATRY & NEUROLOGY
Payer: COMMERCIAL

## 2018-11-07 VITALS
BODY MASS INDEX: 33.43 KG/M2 | TEMPERATURE: 99.5 F | RESPIRATION RATE: 20 BRPM | DIASTOLIC BLOOD PRESSURE: 53 MMHG | SYSTOLIC BLOOD PRESSURE: 117 MMHG | HEIGHT: 67 IN | HEART RATE: 89 BPM | WEIGHT: 213 LBS | OXYGEN SATURATION: 99 %

## 2018-11-07 DIAGNOSIS — G82.20 PARAPARESIS OF BOTH LOWER LIMBS (HCC): ICD-10-CM

## 2018-11-07 LAB
APPEARANCE CSF: CLEAR
GLUCOSE CSF-MCNC: 57 MG/DL (ref 50–80)
GRAM STN SPEC: NORMAL
GRAM STN SPEC: NORMAL
INR PPP: 0.93 (ref 0.86–1.17)
LYMPHOCYTES NFR CSF MANUAL: 75 %
MONOS+MACROS CSF MANUAL: 25 %
PLATELET # BLD AUTO: 295 THOUSANDS/UL (ref 149–390)
PMV BLD AUTO: 10.5 FL (ref 8.9–12.7)
PROT CSF-MCNC: 49 MG/DL (ref 15–45)
PROTHROMBIN TIME: 12.6 SECONDS (ref 11.8–14.2)
RBC # CSF MANUAL: 0 UL (ref 0–10)
TOTAL CELLS COUNTED BLD: NO
TOTAL CELLS COUNTED SPEC: 4
TUBE # CSF: 4
WBC # CSF AUTO: 1 /UL (ref 0–5)

## 2018-11-07 PROCEDURE — 87102 FUNGUS ISOLATION CULTURE: CPT | Performed by: PSYCHIATRY & NEUROLOGY

## 2018-11-07 PROCEDURE — 86592 SYPHILIS TEST NON-TREP QUAL: CPT | Performed by: PSYCHIATRY & NEUROLOGY

## 2018-11-07 PROCEDURE — 87529 HSV DNA AMP PROBE: CPT | Performed by: PSYCHIATRY & NEUROLOGY

## 2018-11-07 PROCEDURE — 88108 CYTOPATH CONCENTRATE TECH: CPT | Performed by: PATHOLOGY

## 2018-11-07 PROCEDURE — 87798 DETECT AGENT NOS DNA AMP: CPT | Performed by: PSYCHIATRY & NEUROLOGY

## 2018-11-07 PROCEDURE — 87116 MYCOBACTERIA CULTURE: CPT | Performed by: PSYCHIATRY & NEUROLOGY

## 2018-11-07 PROCEDURE — 89050 BODY FLUID CELL COUNT: CPT | Performed by: PSYCHIATRY & NEUROLOGY

## 2018-11-07 PROCEDURE — 87532 HHV-6 DNA AMP PROBE: CPT | Performed by: PSYCHIATRY & NEUROLOGY

## 2018-11-07 PROCEDURE — 87498 ENTEROVIRUS PROBE&REVRS TRNS: CPT | Performed by: PSYCHIATRY & NEUROLOGY

## 2018-11-07 PROCEDURE — 88185 FLOWCYTOMETRY/TC ADD-ON: CPT

## 2018-11-07 PROCEDURE — 88184 FLOWCYTOMETRY/ TC 1 MARKER: CPT | Performed by: PSYCHIATRY & NEUROLOGY

## 2018-11-07 PROCEDURE — 87206 SMEAR FLUORESCENT/ACID STAI: CPT | Performed by: PSYCHIATRY & NEUROLOGY

## 2018-11-07 PROCEDURE — 87070 CULTURE OTHR SPECIMN AEROBIC: CPT | Performed by: PSYCHIATRY & NEUROLOGY

## 2018-11-07 PROCEDURE — 89051 BODY FLUID CELL COUNT: CPT | Performed by: PSYCHIATRY & NEUROLOGY

## 2018-11-07 PROCEDURE — 87496 CYTOMEG DNA AMP PROBE: CPT | Performed by: PSYCHIATRY & NEUROLOGY

## 2018-11-07 PROCEDURE — 62270 DX LMBR SPI PNXR: CPT

## 2018-11-07 PROCEDURE — 82164 ANGIOTENSIN I ENZYME TEST: CPT | Performed by: PSYCHIATRY & NEUROLOGY

## 2018-11-07 PROCEDURE — 82945 GLUCOSE OTHER FLUID: CPT | Performed by: PSYCHIATRY & NEUROLOGY

## 2018-11-07 PROCEDURE — 87653 STREP B DNA AMP PROBE: CPT | Performed by: PSYCHIATRY & NEUROLOGY

## 2018-11-07 PROCEDURE — 87476 LYME DIS DNA AMP PROBE: CPT | Performed by: PSYCHIATRY & NEUROLOGY

## 2018-11-07 PROCEDURE — 84157 ASSAY OF PROTEIN OTHER: CPT | Performed by: PSYCHIATRY & NEUROLOGY

## 2018-11-07 PROCEDURE — 85049 AUTOMATED PLATELET COUNT: CPT | Performed by: PSYCHIATRY & NEUROLOGY

## 2018-11-07 PROCEDURE — 85610 PROTHROMBIN TIME: CPT | Performed by: PSYCHIATRY & NEUROLOGY

## 2018-11-07 RX ORDER — ACETAMINOPHEN 325 MG/1
650 TABLET ORAL EVERY 6 HOURS PRN
Status: DISCONTINUED | OUTPATIENT
Start: 2018-11-07 | End: 2018-11-08 | Stop reason: HOSPADM

## 2018-11-07 RX ORDER — SULFAMETHOXAZOLE AND TRIMETHOPRIM 400; 80 MG/1; MG/1
2 TABLET ORAL EVERY OTHER DAY
COMMUNITY
End: 2020-12-16 | Stop reason: ALTCHOICE

## 2018-11-07 NOTE — PROGRESS NOTES
FLUOROSCOPICALLY GUIDED LUMBAR PUNCTURE     INDICATION:  Ataxia, lower extremity weakness and paresthesias for several months  Recent MRI of the thoracic and lumbar spine with and without contrast on November 2, 2018 at [facility] with findings concerning for central nerve enhancement at L4-L5  FLUOROSCOPY TIME:   20 minutes    IMAGES:  2      TECHNIQUE:       Consent was obtained after fully explaining the procedure to the patient  Risks and benefits of procedure were described and understood  Precautions to avoid spinal headache were reviewed  1% lidocaine was infiltrated at the puncture site  Utilizing right paravertebral approach, a 20 gauge 3 5 inch spinal needle was advanced under fluoroscopic guidance into the subarachnoid space at the L2-L3 level, utilizing sterile technique  Once in position, the patient was placed in the left lateral decubitus position  Opening pressure was 23 cm H2O  Approximately 20 5 cc of clear, colorless CSF were removed and placed into 4 tubes which subsequently were transported to the lab for requested analysis  Closing pressure was 8 cm H2O  The needle was removed  The patient tolerated the procedure well  The patient was discharged from the department with appropriate instructions  IMPRESSION:    Successful fluoroscopically guided lumbar puncture with an opening pressure of 23 cm H2O  Approximately 20 5 mL's of clear colorless CSF was sent to lab for requested analysis  Closing pressure was 8 cm H2O  I reviewed the above findings and procedure with Dr Prudence Pepper       PERFORMED, DICTATED AND SIGNED BY: Ximena Ro PA-C

## 2018-11-07 NOTE — DISCHARGE INSTRUCTIONS
Lumbar Puncture   WHAT YOU NEED TO KNOW:   Lumbar puncture (LP) is a procedure in which a needle is inserted in your back and into your spinal canal  This is usually done to collect cerebrospinal fluid (CSF) to check for an infection, inflammation, bleeding, or other conditions that affect the brain  CSF is a clear, protective fluid that flows around the brain and inside the spinal canal  LP may also be done to remove CSF to reduce pressure in the brain  DISCHARGE INSTRUCTIONS:   Medicines:   · Acetaminophen: This medicine decreases pain and lowers a fever  It is available without a doctor's order  Ask how much to take and how often to take it  Follow directions  Acetaminophen can cause liver damage  · NSAIDs:  These medicines decrease swelling, pain, and fever  NSAIDs are available without a doctor's order  Ask your healthcare provider which medicine is right for you and how much to take  Take as directed  NSAIDs can cause stomach bleeding or kidney problems if not taken correctly  · Pain medicine: You may be given a prescription medicine to decrease severe pain  Do not wait until the pain is severe before you take more pain medicine  Follow up with your healthcare provider as directed  Post-lumbar puncture headache: You may develop a headache during the first few hours after your LP that may last for several days  The headache may be mild to severe and may get worse when you sit or stand  The following may help ease a post-lumbar puncture headache:  · Drink plenty of liquids: You should drink more liquid than usual after your LP  Ask how much liquid is right for you  Caffeine may be used to treat a headache  Drinks, such as coffee, tea, or some sodas, have caffeine  Caffeine is also available over the counter in tablet form  Ask about using caffeine to treat your headache  Do not drink alcohol  · Lie down:  If you have a headache after your lumbar puncture, it may be helpful to lie down and rest   Contact your healthcare provider if:   · You have questions or concerns about your condition or care  Seek care immediately or call 911 if:   · You have a severe headache that does not get better after you lie down  · You have a fever  · You have a stiff neck or have trouble thinking clearly  · Your legs, feet, or other parts below the waist feel numb, tingly, or weak  · You have bleeding or a discharge coming from the area where the needle was put into your back  · You have severe pain in your back or neck

## 2018-11-08 LAB
C GATTII+NEOFOR DNA CSF QL NAA+NON-PROBE: NOT DETECTED
CMV DNA CSF QL NAA+NON-PROBE: NOT DETECTED
E COLI K1 DNA CSF QL NAA+NON-PROBE: NOT DETECTED
EV RNA CSF QL NAA+NON-PROBE: NOT DETECTED
GP B STREP DNA CSF QL NAA+NON-PROBE: NOT DETECTED
HAEM INFLU DNA CSF QL NAA+NON-PROBE: NOT DETECTED
HHV6 DNA CSF QL NAA+NON-PROBE: NOT DETECTED
HSV1 DNA CSF QL NAA+NON-PROBE: NOT DETECTED
HSV2 DNA CSF QL NAA+NON-PROBE: NOT DETECTED
L MONOCYTOG DNA CSF QL NAA+NON-PROBE: NOT DETECTED
N MEN DNA CSF QL NAA+NON-PROBE: NOT DETECTED
PARECHOVIRUS A RNA CSF QL NAA+NON-PROBE: NOT DETECTED
REAGIN AB CSF QL: NON REACTIVE
S PNEUM DNA CSF QL NAA+NON-PROBE: NOT DETECTED
VZV DNA CSF QL NAA+NON-PROBE: NOT DETECTED

## 2018-11-09 LAB
ACE CSF-CCNC: 1.2 U/L (ref 0–2.8)
SCAN RESULT: NORMAL

## 2018-11-10 LAB — BACTERIA CSF CULT: NO GROWTH

## 2018-11-12 LAB — EV RNA SPEC QL NAA+PROBE: NEGATIVE

## 2018-11-13 LAB
B BURGDOR DNA SPEC QL NAA+PROBE: NEGATIVE
JCPYV DNA CSF QL NAA+PROBE: NEGATIVE

## 2018-11-14 ENCOUNTER — TELEPHONE (OUTPATIENT)
Dept: NEUROLOGY | Facility: CLINIC | Age: 48
End: 2018-11-14

## 2018-11-14 DIAGNOSIS — R51.9 NEW ONSET OF HEADACHES: Primary | ICD-10-CM

## 2018-11-14 RX ORDER — DEXAMETHASONE 2 MG/1
2 TABLET ORAL
Qty: 2 TABLET | Refills: 0 | Status: SHIPPED | OUTPATIENT
Start: 2018-11-14 | End: 2019-04-25

## 2018-11-14 RX ORDER — PROCHLORPERAZINE MALEATE 10 MG
TABLET ORAL
Qty: 2 TABLET | Refills: 0 | Status: SHIPPED | OUTPATIENT
Start: 2018-11-14 | End: 2019-04-25

## 2018-11-14 NOTE — TELEPHONE ENCOUNTER
Pt called to report she was seen in [facility] ED yesterday for migraine since 11/8 - post LP  States that at first sx would resolve when she would lie down however now having constant migraine  Pt did have vomiting  Please see Care Everywhere for ED notes  Pt was advised to follow up w/ Neuro for blood patch  Pt staying hydrated and tried advil, reglan, fioricet, nucynta, and benadryl before going to ED  Please advise  Pt [phone]  Ok to leave detailed message

## 2018-11-14 NOTE — TELEPHONE ENCOUNTER
Called - left the message - Decadron 2 mg and compazine 10 mg was sent to her pharmacy  Patient was asked to call back if headache persist and we will help with Blood patch

## 2018-11-15 LAB
PCR AMPLIFICATION + DETECTION: NORMAL
WNV RNA SPEC QL NAA+PROBE: NEGATIVE

## 2018-11-15 NOTE — TELEPHONE ENCOUNTER
Called pt to confirm receipt of message  Pt picked up med yestarday and stated "OMG whatever she sent - it's a miracle worker  I already feel a difference " Pt to call back if HA continues

## 2018-11-16 DIAGNOSIS — G44.59 OTHER COMPLICATED HEADACHE SYNDROME: Primary | ICD-10-CM

## 2018-11-16 RX ORDER — DEXAMETHASONE 2 MG/1
2 TABLET ORAL
Qty: 2 TABLET | Refills: 0 | Status: SHIPPED | OUTPATIENT
Start: 2018-11-16 | End: 2019-04-25

## 2018-11-16 RX ORDER — PROCHLORPERAZINE MALEATE 10 MG
TABLET ORAL
Qty: 2 TABLET | Refills: 0 | Status: SHIPPED | OUTPATIENT
Start: 2018-11-16 | End: 2019-04-25

## 2018-11-16 NOTE — TELEPHONE ENCOUNTER
Excellent - she may have ibuprofen and f she has headache again - I sent another 2 doses of decadron 2 mg if headache returns, but she must take zantac then

## 2018-11-16 NOTE — TELEPHONE ENCOUNTER
Patient states she does feel better "best day I've felt", but still not 100%, she states she still does have to lie down if she is up and moving around too much  She states the meds she was given by Dr Nayely Fenton were amazing  She wants to know if she should continue to take NSAIDs for her migraine, she did take advil today which did help, and states she is able to eat today  Please advise

## 2018-12-10 LAB — FUNGUS SPEC CULT: NORMAL

## 2018-12-26 LAB
MYCOBACTERIUM SPEC CULT: NORMAL
RHODAMINE-AURAMINE STN SPEC: NORMAL

## 2019-04-25 ENCOUNTER — OFFICE VISIT (OUTPATIENT)
Dept: NEUROLOGY | Facility: CLINIC | Age: 49
End: 2019-04-25
Payer: COMMERCIAL

## 2019-04-25 VITALS — HEIGHT: 67 IN | BODY MASS INDEX: 35.47 KG/M2 | WEIGHT: 226 LBS

## 2019-04-25 DIAGNOSIS — M47.26 OTHER SPONDYLOSIS WITH RADICULOPATHY, LUMBAR REGION: ICD-10-CM

## 2019-04-25 DIAGNOSIS — M62.838 MUSCLE SPASMS OF BOTH LOWER EXTREMITIES: ICD-10-CM

## 2019-04-25 DIAGNOSIS — G03.0 NONBACTERIAL ARACHNOIDITIS: ICD-10-CM

## 2019-04-25 DIAGNOSIS — R29.898 WEAKNESS OF BOTH LOWER EXTREMITIES: Primary | ICD-10-CM

## 2019-04-25 DIAGNOSIS — H90.5 SENSORINEURAL HEARING LOSS (SNHL) OF LEFT EAR, UNSPECIFIED HEARING STATUS ON CONTRALATERAL SIDE: ICD-10-CM

## 2019-04-25 DIAGNOSIS — D51.0 PERNICIOUS ANEMIA: ICD-10-CM

## 2019-04-25 DIAGNOSIS — K29.40 ATROPHIC GASTRITIS WITHOUT HEMORRHAGE: ICD-10-CM

## 2019-04-25 PROCEDURE — 99215 OFFICE O/P EST HI 40 MIN: CPT | Performed by: PSYCHIATRY & NEUROLOGY

## 2019-04-25 RX ORDER — METHOCARBAMOL 750 MG/1
750 TABLET, FILM COATED ORAL EVERY 8 HOURS SCHEDULED
Qty: 90 TABLET | Refills: 1 | Status: SHIPPED | OUTPATIENT
Start: 2019-04-25 | End: 2020-01-21 | Stop reason: SDUPTHER

## 2019-04-25 RX ORDER — TIZANIDINE HYDROCHLORIDE 4 MG/1
4 CAPSULE, GELATIN COATED ORAL 3 TIMES DAILY
COMMUNITY
End: 2019-06-19 | Stop reason: SINTOL

## 2019-05-02 ENCOUNTER — TELEPHONE (OUTPATIENT)
Dept: NEUROLOGY | Facility: CLINIC | Age: 49
End: 2019-05-02

## 2019-06-12 ENCOUNTER — HOSPITAL ENCOUNTER (OUTPATIENT)
Dept: RADIOLOGY | Facility: HOSPITAL | Age: 49
Discharge: HOME | End: 2019-06-12
Attending: PSYCHIATRY & NEUROLOGY
Payer: COMMERCIAL

## 2019-06-12 DIAGNOSIS — M62.838 MUSCLE SPASMS OF BOTH LOWER EXTREMITIES: ICD-10-CM

## 2019-06-12 DIAGNOSIS — R29.898 WEAKNESS OF BOTH LOWER EXTREMITIES: ICD-10-CM

## 2019-06-12 DIAGNOSIS — M47.26 OTHER SPONDYLOSIS WITH RADICULOPATHY, LUMBAR REGION: ICD-10-CM

## 2019-06-12 DIAGNOSIS — H90.5 SENSORINEURAL HEARING LOSS (SNHL) OF LEFT EAR, UNSPECIFIED HEARING STATUS ON CONTRALATERAL SIDE: ICD-10-CM

## 2019-06-12 PROCEDURE — 70553 MRI BRAIN STEM W/O & W/DYE: CPT

## 2019-06-12 PROCEDURE — A9585 GADOBUTROL INJECTION: HCPCS | Performed by: PSYCHIATRY & NEUROLOGY

## 2019-06-12 PROCEDURE — 72158 MRI LUMBAR SPINE W/O & W/DYE: CPT

## 2019-06-12 RX ADMIN — GADOBUTROL 11 ML: 604.72 INJECTION INTRAVENOUS at 17:06

## 2019-06-19 ENCOUNTER — OFFICE VISIT (OUTPATIENT)
Dept: NEUROLOGY | Facility: CLINIC | Age: 49
End: 2019-06-19
Payer: COMMERCIAL

## 2019-06-19 VITALS
RESPIRATION RATE: 16 BRPM | WEIGHT: 229 LBS | BODY MASS INDEX: 35.94 KG/M2 | DIASTOLIC BLOOD PRESSURE: 80 MMHG | HEIGHT: 67 IN | SYSTOLIC BLOOD PRESSURE: 110 MMHG

## 2019-06-19 DIAGNOSIS — D51.0 PERNICIOUS ANEMIA: ICD-10-CM

## 2019-06-19 DIAGNOSIS — M62.838 MUSCLE SPASMS OF BOTH LOWER EXTREMITIES: Primary | ICD-10-CM

## 2019-06-19 DIAGNOSIS — R20.0 NUMBNESS IN BOTH LEGS: ICD-10-CM

## 2019-06-19 DIAGNOSIS — G89.29 CHRONIC NECK PAIN: ICD-10-CM

## 2019-06-19 DIAGNOSIS — M79.2 NEURALGIA AND NEURITIS: ICD-10-CM

## 2019-06-19 DIAGNOSIS — M54.2 CHRONIC NECK PAIN: ICD-10-CM

## 2019-06-19 PROCEDURE — 99215 OFFICE O/P EST HI 40 MIN: CPT | Performed by: PSYCHIATRY & NEUROLOGY

## 2019-06-19 RX ORDER — GABAPENTIN 300 MG/1
300 CAPSULE ORAL 3 TIMES DAILY
Qty: 90 CAPSULE | Refills: 3 | Status: SHIPPED | OUTPATIENT
Start: 2019-06-19 | End: 2020-12-16 | Stop reason: ALTCHOICE

## 2019-07-16 ENCOUNTER — HOSPITAL ENCOUNTER (OUTPATIENT)
Dept: RADIOLOGY | Age: 49
Discharge: HOME | End: 2019-07-16
Payer: COMMERCIAL

## 2019-07-16 DIAGNOSIS — G89.29 CHRONIC NECK PAIN: ICD-10-CM

## 2019-07-16 DIAGNOSIS — M54.2 CHRONIC NECK PAIN: ICD-10-CM

## 2019-07-16 PROCEDURE — A9585 GADOBUTROL INJECTION: HCPCS | Performed by: PSYCHIATRY & NEUROLOGY

## 2019-07-16 PROCEDURE — 72156 MRI NECK SPINE W/O & W/DYE: CPT

## 2019-07-16 RX ADMIN — GADOBUTROL 10 ML: 604.72 INJECTION INTRAVENOUS at 12:11

## 2019-07-18 ENCOUNTER — TELEPHONE (OUTPATIENT)
Dept: NEUROLOGY | Facility: CLINIC | Age: 49
End: 2019-07-18

## 2019-07-18 NOTE — TELEPHONE ENCOUNTER
Pt called and advised pt of all of the below  She  verbalized clear understanding of all instructions  Pt states that she did start the gabapentin yet  Planning to start next week

## 2019-07-18 NOTE — TELEPHONE ENCOUNTER
----- Message from Tay Brooks DO sent at 7/18/2019  3:16 PM EDT -----  Please call the pt   Starla Kirk  The mri of the c spine is  Abnormal     It does show evidence of the prior cervical spinal surgery post operative and alignment however there is also evidence of a pinched nerve lower down at C7 which could be contributing to her symptoms of numbness and tingling in the hands she needs to check with pain management about other possible treatments certain injections can help to reduce that information    Please find out if the gabapentin dose is helping

## 2019-07-26 ENCOUNTER — TELEPHONE (OUTPATIENT)
Dept: NEUROLOGY | Facility: CLINIC | Age: 49
End: 2019-07-26

## 2019-07-26 NOTE — TELEPHONE ENCOUNTER
Niru @ \[facility]\"" lab made aware they can run as either serum or plasma  She stated they will run it as plasma because they can do that in-house, the serum would take longer

## 2019-07-26 NOTE — TELEPHONE ENCOUNTER
Received a call from Corry ray w/[facility]  She is calling to confirm if Dr Ranjith Leyva wants her to run lab as homocysteine, serum  She stated they usually run it as homocysteine, plasma  Please clarify  Thank you      Corry ray: [phone]

## 2019-08-28 ENCOUNTER — OFFICE VISIT (OUTPATIENT)
Dept: NEUROLOGY | Facility: CLINIC | Age: 49
End: 2019-08-28
Payer: COMMERCIAL

## 2019-08-28 VITALS
WEIGHT: 227 LBS | HEART RATE: 99 BPM | HEIGHT: 67 IN | BODY MASS INDEX: 35.63 KG/M2 | DIASTOLIC BLOOD PRESSURE: 64 MMHG | SYSTOLIC BLOOD PRESSURE: 129 MMHG

## 2019-08-28 DIAGNOSIS — R29.898 WEAKNESS OF BOTH LOWER EXTREMITIES: Primary | ICD-10-CM

## 2019-08-28 DIAGNOSIS — R20.2 NUMBNESS AND TINGLING IN LEFT ARM: ICD-10-CM

## 2019-08-28 DIAGNOSIS — D51.0 PERNICIOUS ANEMIA: ICD-10-CM

## 2019-08-28 DIAGNOSIS — R20.0 NUMBNESS AND TINGLING IN LEFT ARM: ICD-10-CM

## 2019-08-28 PROCEDURE — 99215 OFFICE O/P EST HI 40 MIN: CPT | Performed by: PSYCHIATRY & NEUROLOGY

## 2019-08-28 NOTE — PROGRESS NOTES
[facility]  PATIENT:  Ulyses Boas  MRN:  [id number]  :  1970  DATE OF SERVICE:  2019    Assessment/Plan:     Problem List Items Addressed This Visit        Nervous and Auditory    Weakness of both lower extremities - Primary       Other    Pernicious anemia    Numbness and tingling in left arm           Mrs Alfredo Katz has presented for follow up on multiple neurologic complaints with known autoimmune disorders along with cervical spondylosis s/p fusion  Patient completed blood work for multiple other autoimune conditions which potentially may involve peripheral nerves, and results are negative  Repeated lumbar MRI suggest no inflammation, patient follows with pain management team for rhizotomy  Patient has worsening sensory dysfunction - we discussed her brain mRI was repeated and no sign of progression of demyelination and no signs of vasculitis  Patient was clear that after stopping her 2 immunosuppressive therapies, she may develop relapses of her RA, including worsening sensory and motor dysfunction  Patient is scheduled with Dr Nasima Bennett 10/07/2019  Follow up with [facility] will be on annual bases, with brain imaging in   Subjective: sensory and motor dysfunction upper and lower extremities    HPI History of Present Illness    Mrs Alfredo Katz has a history of 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, Difficulty swallowing, left cervical radiculopathy, left shoulder replacement, lumbar spondylosis, Factor V Leiden syndrome, Factor VIII deficiency, Fever of unknown origin, Pernicious anemia, RA, atrophic gastritis, who presented for follow up on lower extremities weakness and visual disturbances with lumbar non-bacterial arachnoiditis  EMG/NCS was completed in 2018 - bilateral lower extremities has normal electrophysiological findings     Prior evaluation in 2019 was consistent with left hearing dysfunction with worsening balance now - MRI IAC W/WO with ENT was advised- normal findings  MRI brain 6/2019- 9/2018: Several scattered supratentorial white matter T2 and FLAIR hyperintense foci  Although nonspecific, findings likely represent mild chronic microangiopathic changes  Similar findings can accompany migraine headaches  Clinical correlation recommended  Normal posterior fossa and IACs  Similar to prior MRI study  MRI L-spine 6/2019: Diminished size of small central disc protrusion without overt neural element impingement  Multilevel degenerative spondylosis  No pathologic enhancement  MRI C-spine 7/2019:  Spondylotic degenerative disease results in moderate left foraminal narrowing at C6-7  Correlate for left C7 radiculopathy  Status post ACDF C4-C6 with typical postoperative alignment  Patient had completed GM1 Ab, GM2 Ab, GD1a Ab, GD1b Ab, GQ1b, Ab and all was negative; NMO/MOG was normal; Patient has homocysteine and methyl malonic acid and B12 level  Patient describing worsening symptoms with more left-sided sensory dysfunction in LUE/LLE, left -sided hearing loss, and worsening muscle spasms in her lower extremities  Patient had discontinued immunosuppressive therapy 2 months ago, including leflunomide and  Plaquenil  The following portions of the patient's history were reviewed and updated as appropriate: She  has a past medical history of Atrophy of vagina, Avascular necrosis ([address] ), Depression, Easy bruisability, Endometriosis, Factor V Leiden mutation ([facility] [address] ), migraines, Lichen sclerosus, Ovarian cyst, Pelvic pain, Periodic limb movement disorder, and Peripheral neuropathy    She   Patient Active Problem List    Diagnosis Date Noted    Numbness and tingling in left arm 08/28/2019    Chronic neck pain 06/19/2019    Muscle spasms of both lower extremities 04/25/2019    Pernicious anemia 04/25/2019    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 04/25/2019    Other abnormal findings on diagnostic imaging of central nervous system 10/24/2018    Weakness of both lower extremities 10/24/2018    Dysphagia 10/24/2018    Numbness in both legs 10/24/2018    Other spondylosis with radiculopathy, lumbar region 10/24/2018     She  has a past surgical history that includes Hysterectomy; Appendectomy;  section; Laparoscopic ovarian cystectomy; and [place] lumbar puncture (2018)  Her family history is not on file  She  reports that she has been smoking  She has a 30 00 pack-year smoking history  She has never used smokeless tobacco  She reports that she does not drink alcohol or use drugs  Current Outpatient Medications   Medication Sig Dispense Refill    Cobalamine Combinations (B-12) 1000-400 MCG SUBL Inject into a muscle every 30 (thirty) days        Butalbital-APAP-Caffeine -40 MG CAPS as needed      gabapentin (NEURONTIN) 300 mg capsule Take 1 capsule (300 mg total) by mouth 3 (three) times a day (Patient not taking: Reported on 2019) 90 capsule 3    hydroxychloroquine (PLAQUENIL) 200 mg tablet Take 200 mg by mouth 2 (two) times a day with meals        leflunomide (ARAVA) 20 MG tablet 20 mg daily        methocarbamol (ROBAXIN) 750 mg tablet Take 1 tablet (750 mg total) by mouth every 8 (eight) hours (Patient not taking: Reported on 2019) 90 tablet 1    metoclopramide (REGLAN) 10 mg tablet Take 10 mg by mouth as needed      metoprolol succinate (TOPROL-XL) 25 mg 24 hr tablet Take 25 mg by mouth daily  3    NUCYNTA tablet 100 mg 4 (four) times a day as needed    0    omeprazole (PriLOSEC) 10 mg delayed release capsule Take 10 mg by mouth as needed      sucralfate (CARAFATE) 1 g/10 mL suspension Take by mouth as needed      sulfamethoxazole-trimethoprim (BACTRIM) 400-80 mg per tablet Take 2 tablets by mouth every other day      traZODone (DESYREL) 100 mg tablet Take 100 mg by mouth daily at bedtime         No current facility-administered medications for this visit        Current Outpatient Medications on File Prior to Visit   Medication Sig    Cobalamine Combinations (B-12) 1000-400 MCG SUBL Inject into a muscle every 30 (thirty) days      Butalbital-APAP-Caffeine -40 MG CAPS as needed    gabapentin (NEURONTIN) 300 mg capsule Take 1 capsule (300 mg total) by mouth 3 (three) times a day (Patient not taking: Reported on 8/28/2019)    hydroxychloroquine (PLAQUENIL) 200 mg tablet Take 200 mg by mouth 2 (two) times a day with meals      leflunomide (ARAVA) 20 MG tablet 20 mg daily      methocarbamol (ROBAXIN) 750 mg tablet Take 1 tablet (750 mg total) by mouth every 8 (eight) hours (Patient not taking: Reported on 8/28/2019)    metoclopramide (REGLAN) 10 mg tablet Take 10 mg by mouth as needed    metoprolol succinate (TOPROL-XL) 25 mg 24 hr tablet Take 25 mg by mouth daily    NUCYNTA tablet 100 mg 4 (four) times a day as needed      omeprazole (PriLOSEC) 10 mg delayed release capsule Take 10 mg by mouth as needed    sucralfate (CARAFATE) 1 g/10 mL suspension Take by mouth as needed    sulfamethoxazole-trimethoprim (BACTRIM) 400-80 mg per tablet Take 2 tablets by mouth every other day    traZODone (DESYREL) 100 mg tablet Take 100 mg by mouth daily at bedtime       No current facility-administered medications on file prior to visit  She is allergic to other            Objective:    Blood pressure 129/64, pulse 99, height 5' 7" (1 702 m), weight 103 kg (227 lb)  Physical Exam/Neurological Exam    CONSTITUTIONAL: NAD, pleasant  NECK: supple, no lymphadenopathy, no thyromegaly, no JVD  CARDIOVASCULAR: RRR, normal S1S2, no murmurs, no rubs  RESP: clear to auscultation bilaterally, no wheezes/rhonchi/rales  ABDOMEN: soft, non tender, non distended  SKIN: no rash or skin lesions  EXTREMITIES: no edema, pulses 2+bilaterally  PSYCH: appropriate mood and affect  NEUROLOGIC COMPREHENSIVE EXAM: Patient is oriented to person, place and time, NAD; appropriate affect   CN II, III, IV, V, VI, VII,VIII,IX,X,XI-XII intact with EOMI, PERRLA, OKN intact, VF grossly intact, fundi poorly visualized secondary to pupillary constriction; symmetric face noted  Motor: 5/5 UE/LE bilateral symmetric; Sensory: intact to light touch and pinprick bilaterally; normal vibration sensation feet bilaterally; Coordination within normal limits on FTN and AMRSHA testing; DTR: 2/4 through, no Babinski, no clonus  Tandem gait is intact  Romberg: negative  ROS:  12 points of review of system was reviewed with the patient and was unremarkable with exception: see HPI  Review of Systems   Constitutional: Positive for fatigue  HENT: Positive for hearing loss  Eyes: Negative  Respiratory: Negative  Cardiovascular: Negative  Gastrointestinal: Negative  Endocrine: Negative  Genitourinary: Negative  Musculoskeletal: Positive for back pain (lower back ), neck pain and neck stiffness  Skin: Negative  Allergic/Immunologic: Negative  Neurological: Positive for numbness (fingers and left side for arm and leg)  Weakness: left leg    Hematological: Negative  Psychiatric/Behavioral: Positive for sleep disturbance (trouble falling asleep and waking up to much )

## 2019-09-16 ENCOUNTER — TELEPHONE (OUTPATIENT)
Dept: NEUROLOGY | Facility: CLINIC | Age: 49
End: 2019-09-16

## 2019-09-16 NOTE — TELEPHONE ENCOUNTER
Patient states she is scheduled for total shoulder replacement on 10/9/19  Her surgeon is requiring surgical clearance from all of her doctors, including neurology  She will drop off form at [place] office  Dr Manoj Paulino- are you agreeable to completing form for patient?      [phone]  Okay to leave detailed message

## 2019-09-27 ENCOUNTER — TELEPHONE (OUTPATIENT)
Dept: NEUROLOGY | Facility: CLINIC | Age: 49
End: 2019-09-27

## 2019-09-27 NOTE — TELEPHONE ENCOUNTER
Form received  They are asking for a clearance letter  No signature required on form  Letter will need to be signed  Letter generated, under letters    Please have dr ponce sign and please fax to [phone] attn:[email or web address]

## 2019-09-27 NOTE — TELEPHONE ENCOUNTER
Patient dropped off form from [facility] for clearance for surgery  Patient said that they spoke to Dr Ryland Urias about this  Scanned into system and placed in bin

## 2019-09-30 NOTE — TELEPHONE ENCOUNTER
Marisabel Cruz called requesting last office note sent to be included with clearance documentation  Note printed and faxed to [phone]

## 2019-10-09 ENCOUNTER — HOSPITAL ENCOUNTER (INPATIENT)
Facility: HOSPITAL | Age: 49
LOS: 1 days | Discharge: HOME | DRG: 483 | End: 2019-10-10
Attending: ORTHOPAEDIC SURGERY | Admitting: ORTHOPAEDIC SURGERY
Payer: COMMERCIAL

## 2019-10-09 ENCOUNTER — APPOINTMENT (INPATIENT)
Dept: RADIOLOGY | Facility: HOSPITAL | Age: 49
DRG: 483 | End: 2019-10-09
Attending: STUDENT IN AN ORGANIZED HEALTH CARE EDUCATION/TRAINING PROGRAM
Payer: COMMERCIAL

## 2019-10-09 ENCOUNTER — ANESTHESIA EVENT (OUTPATIENT)
Dept: OPERATING ROOM | Facility: HOSPITAL | Age: 49
Setting detail: SURGERY ADMIT
DRG: 483 | End: 2019-10-09
Payer: COMMERCIAL

## 2019-10-09 DIAGNOSIS — M24.412 RECURRENT SUBLUXATION OF LEFT SHOULDER: ICD-10-CM

## 2019-10-09 DIAGNOSIS — M19.012 OSTEOARTHRITIS OF LEFT SHOULDER, UNSPECIFIED OSTEOARTHRITIS TYPE: ICD-10-CM

## 2019-10-09 PROCEDURE — 25800000 HC PHARMACY IV SOLUTIONS: Performed by: ORTHOPAEDIC SURGERY

## 2019-10-09 PROCEDURE — C1713 ANCHOR/SCREW BN/BN,TIS/BN: HCPCS | Performed by: ORTHOPAEDIC SURGERY

## 2019-10-09 PROCEDURE — 63600000 HC DRUGS/DETAIL CODE: Performed by: STUDENT IN AN ORGANIZED HEALTH CARE EDUCATION/TRAINING PROGRAM

## 2019-10-09 PROCEDURE — C1776 JOINT DEVICE (IMPLANTABLE): HCPCS | Performed by: ORTHOPAEDIC SURGERY

## 2019-10-09 PROCEDURE — 63600000 HC DRUGS/DETAIL CODE: Performed by: ANESTHESIOLOGY

## 2019-10-09 PROCEDURE — 63600000 HC DRUGS/DETAIL CODE: Performed by: NURSE ANESTHETIST, CERTIFIED REGISTERED

## 2019-10-09 PROCEDURE — 63700000 HC SELF-ADMINISTRABLE DRUG: Performed by: STUDENT IN AN ORGANIZED HEALTH CARE EDUCATION/TRAINING PROGRAM

## 2019-10-09 PROCEDURE — 25800000 HC PHARMACY IV SOLUTIONS: Performed by: STUDENT IN AN ORGANIZED HEALTH CARE EDUCATION/TRAINING PROGRAM

## 2019-10-09 PROCEDURE — 71000011 HC PACU PHASE 1 EA ADDL MIN: Performed by: ORTHOPAEDIC SURGERY

## 2019-10-09 PROCEDURE — 27200000 HC STERILE SUPPLY: Performed by: ORTHOPAEDIC SURGERY

## 2019-10-09 PROCEDURE — 25800000 HC PHARMACY IV SOLUTIONS: Performed by: NURSE ANESTHETIST, CERTIFIED REGISTERED

## 2019-10-09 PROCEDURE — 25000000 HC PHARMACY GENERAL: Performed by: NURSE ANESTHETIST, CERTIFIED REGISTERED

## 2019-10-09 PROCEDURE — 99232 SBSQ HOSP IP/OBS MODERATE 35: CPT | Performed by: HOSPITALIST

## 2019-10-09 PROCEDURE — 63700000 HC SELF-ADMINISTRABLE DRUG: Performed by: ANESTHESIOLOGY

## 2019-10-09 PROCEDURE — 63600000 HC DRUGS/DETAIL CODE: Performed by: ORTHOPAEDIC SURGERY

## 2019-10-09 PROCEDURE — 0RPK0JZ REMOVAL OF SYNTHETIC SUBSTITUTE FROM LEFT SHOULDER JOINT, OPEN APPROACH: ICD-10-PCS | Performed by: ORTHOPAEDIC SURGERY

## 2019-10-09 PROCEDURE — 0RRK0JZ REPLACEMENT OF LEFT SHOULDER JOINT WITH SYNTHETIC SUBSTITUTE, OPEN APPROACH: ICD-10-PCS | Performed by: ORTHOPAEDIC SURGERY

## 2019-10-09 PROCEDURE — 36000005 HC OR LEVEL 5 INITIAL 30MIN: Performed by: ORTHOPAEDIC SURGERY

## 2019-10-09 PROCEDURE — 73020 X-RAY EXAM OF SHOULDER: CPT | Mod: LT

## 2019-10-09 PROCEDURE — 87205 SMEAR GRAM STAIN: CPT | Performed by: ORTHOPAEDIC SURGERY

## 2019-10-09 PROCEDURE — 25000000 HC PHARMACY GENERAL: Performed by: ORTHOPAEDIC SURGERY

## 2019-10-09 PROCEDURE — 12000000 HC ROOM AND CARE MED/SURG

## 2019-10-09 PROCEDURE — 37000001 HC ANESTHESIA GENERAL: Performed by: ORTHOPAEDIC SURGERY

## 2019-10-09 PROCEDURE — 87070 CULTURE OTHR SPECIMN AEROBIC: CPT | Performed by: ORTHOPAEDIC SURGERY

## 2019-10-09 PROCEDURE — 36000015 HC OR LEVEL 5 EA ADDL MIN: Performed by: ORTHOPAEDIC SURGERY

## 2019-10-09 PROCEDURE — 71000001 HC PACU PHASE 1 INITIAL 30MIN: Performed by: ORTHOPAEDIC SURGERY

## 2019-10-09 DEVICE — HUMERAL STEM SHORT 12MM X 67MM: Type: IMPLANTABLE DEVICE | Site: SHOULDER | Status: FUNCTIONAL

## 2019-10-09 DEVICE — IMPLANTABLE DEVICE: Type: IMPLANTABLE DEVICE | Site: SHOULDER | Status: FUNCTIONAL

## 2019-10-09 DEVICE — HUMERAL NECK NEUTRAL: Type: IMPLANTABLE DEVICE | Site: SHOULDER | Status: FUNCTIONAL

## 2019-10-09 DEVICE — CEMENT BONE SIMPLEX FULL DOSE: Type: IMPLANTABLE DEVICE | Site: SHOULDER | Status: FUNCTIONAL

## 2019-10-09 DEVICE — GLENOID ALL-POLY PEGGED 46MM: Type: IMPLANTABLE DEVICE | Site: SHOULDER | Status: FUNCTIONAL

## 2019-10-09 DEVICE — CEMENT BONE SIMPLEX W/TOBRAMYCIN: Type: IMPLANTABLE DEVICE | Site: SHOULDER | Status: FUNCTIONAL

## 2019-10-09 RX ORDER — FENTANYL CITRATE 50 UG/ML
INJECTION, SOLUTION INTRAMUSCULAR; INTRAVENOUS AS NEEDED
Status: DISCONTINUED | OUTPATIENT
Start: 2019-10-09 | End: 2019-10-09 | Stop reason: SURG

## 2019-10-09 RX ORDER — IBUPROFEN 200 MG
16-32 TABLET ORAL AS NEEDED
Status: DISCONTINUED | OUTPATIENT
Start: 2019-10-09 | End: 2019-10-10 | Stop reason: HOSPADM

## 2019-10-09 RX ORDER — GABAPENTIN 300 MG/1
CAPSULE ORAL
Status: DISCONTINUED
Start: 2019-10-09 | End: 2019-10-10 | Stop reason: HOSPADM

## 2019-10-09 RX ORDER — FENTANYL CITRATE 50 UG/ML
50 INJECTION, SOLUTION INTRAMUSCULAR; INTRAVENOUS
Status: DISCONTINUED | OUTPATIENT
Start: 2019-10-09 | End: 2019-10-09 | Stop reason: HOSPADM

## 2019-10-09 RX ORDER — SENNOSIDES 8.6 MG/1
1 TABLET ORAL 2 TIMES DAILY PRN
Status: DISCONTINUED | OUTPATIENT
Start: 2019-10-09 | End: 2019-10-10 | Stop reason: HOSPADM

## 2019-10-09 RX ORDER — DEXTROSE 40 %
15-30 GEL (GRAM) ORAL AS NEEDED
Status: DISCONTINUED | OUTPATIENT
Start: 2019-10-09 | End: 2019-10-10 | Stop reason: HOSPADM

## 2019-10-09 RX ORDER — ACETAMINOPHEN 325 MG/1
TABLET ORAL
Status: DISCONTINUED
Start: 2019-10-09 | End: 2019-10-10 | Stop reason: HOSPADM

## 2019-10-09 RX ORDER — GLYCOPYRROLATE 0.6MG/3ML
SYRINGE (ML) INTRAVENOUS AS NEEDED
Status: DISCONTINUED | OUTPATIENT
Start: 2019-10-09 | End: 2019-10-09 | Stop reason: SURG

## 2019-10-09 RX ORDER — BISACODYL 10 MG/1
10 SUPPOSITORY RECTAL DAILY PRN
Status: DISCONTINUED | OUTPATIENT
Start: 2019-10-09 | End: 2019-10-10 | Stop reason: HOSPADM

## 2019-10-09 RX ORDER — GABAPENTIN 300 MG/1
300 CAPSULE ORAL ONCE
Status: COMPLETED | OUTPATIENT
Start: 2019-10-09 | End: 2019-10-09

## 2019-10-09 RX ORDER — ROCURONIUM BROMIDE 10 MG/ML
INJECTION, SOLUTION INTRAVENOUS AS NEEDED
Status: DISCONTINUED | OUTPATIENT
Start: 2019-10-09 | End: 2019-10-09 | Stop reason: SURG

## 2019-10-09 RX ORDER — MIDAZOLAM HYDROCHLORIDE 2 MG/2ML
2 INJECTION, SOLUTION INTRAMUSCULAR; INTRAVENOUS ONCE
Status: DISCONTINUED | OUTPATIENT
Start: 2019-10-09 | End: 2019-10-09 | Stop reason: HOSPADM

## 2019-10-09 RX ORDER — SODIUM CHLORIDE 9 MG/ML
INJECTION, SOLUTION INTRAVENOUS CONTINUOUS
Status: DISCONTINUED | OUTPATIENT
Start: 2019-10-09 | End: 2019-10-09 | Stop reason: HOSPADM

## 2019-10-09 RX ORDER — DOCUSATE SODIUM 100 MG/1
100 CAPSULE, LIQUID FILLED ORAL 2 TIMES DAILY
Status: DISCONTINUED | OUTPATIENT
Start: 2019-10-09 | End: 2019-10-10

## 2019-10-09 RX ORDER — OXYCODONE HYDROCHLORIDE 5 MG/1
5 TABLET ORAL ONCE AS NEEDED
Status: DISCONTINUED | OUTPATIENT
Start: 2019-10-09 | End: 2019-10-09 | Stop reason: HOSPADM

## 2019-10-09 RX ORDER — HYDROMORPHONE HYDROCHLORIDE 1 MG/ML
0.5 INJECTION, SOLUTION INTRAMUSCULAR; INTRAVENOUS; SUBCUTANEOUS
Status: DISCONTINUED | OUTPATIENT
Start: 2019-10-09 | End: 2019-10-09 | Stop reason: HOSPADM

## 2019-10-09 RX ORDER — HYDROMORPHONE HYDROCHLORIDE 1 MG/ML
0.25 INJECTION, SOLUTION INTRAMUSCULAR; INTRAVENOUS; SUBCUTANEOUS ONCE AS NEEDED
Status: COMPLETED | OUTPATIENT
Start: 2019-10-09 | End: 2019-10-09

## 2019-10-09 RX ORDER — ACETAMINOPHEN 325 MG/1
975 TABLET ORAL ONCE
Status: COMPLETED | OUTPATIENT
Start: 2019-10-09 | End: 2019-10-09

## 2019-10-09 RX ORDER — LABETALOL HCL 20 MG/4 ML
SYRINGE (ML) INTRAVENOUS AS NEEDED
Status: DISCONTINUED | OUTPATIENT
Start: 2019-10-09 | End: 2019-10-09 | Stop reason: SURG

## 2019-10-09 RX ORDER — SODIUM CHLORIDE, SODIUM GLUCONATE, SODIUM ACETATE, POTASSIUM CHLORIDE AND MAGNESIUM CHLORIDE 30; 37; 368; 526; 502 MG/100ML; MG/100ML; MG/100ML; MG/100ML; MG/100ML
INJECTION, SOLUTION INTRAVENOUS CONTINUOUS PRN
Status: DISCONTINUED | OUTPATIENT
Start: 2019-10-09 | End: 2019-10-09 | Stop reason: SURG

## 2019-10-09 RX ORDER — ONDANSETRON HYDROCHLORIDE 2 MG/ML
4 INJECTION, SOLUTION INTRAVENOUS ONCE AS NEEDED
Status: DISCONTINUED | OUTPATIENT
Start: 2019-10-09 | End: 2019-10-09 | Stop reason: HOSPADM

## 2019-10-09 RX ORDER — BUPIVACAINE HYDROCHLORIDE 5 MG/ML
INJECTION, SOLUTION PERINEURAL AS NEEDED
Status: DISCONTINUED | OUTPATIENT
Start: 2019-10-09 | End: 2019-10-09 | Stop reason: HOSPADM

## 2019-10-09 RX ORDER — PROPOFOL 10 MG/ML
INJECTION, EMULSION INTRAVENOUS AS NEEDED
Status: DISCONTINUED | OUTPATIENT
Start: 2019-10-09 | End: 2019-10-09 | Stop reason: SURG

## 2019-10-09 RX ORDER — GABAPENTIN 300 MG/1
600 CAPSULE ORAL ONCE
Status: DISCONTINUED | OUTPATIENT
Start: 2019-10-09 | End: 2019-10-09

## 2019-10-09 RX ORDER — ONDANSETRON HYDROCHLORIDE 2 MG/ML
4 INJECTION, SOLUTION INTRAVENOUS EVERY 8 HOURS PRN
Status: DISCONTINUED | OUTPATIENT
Start: 2019-10-09 | End: 2019-10-10 | Stop reason: HOSPADM

## 2019-10-09 RX ORDER — METOPROLOL SUCCINATE 25 MG/1
25 TABLET, EXTENDED RELEASE ORAL
Status: DISCONTINUED | OUTPATIENT
Start: 2019-10-09 | End: 2019-10-10 | Stop reason: HOSPADM

## 2019-10-09 RX ORDER — LABETALOL HCL 20 MG/4 ML
5 SYRINGE (ML) INTRAVENOUS AS NEEDED
Status: DISCONTINUED | OUTPATIENT
Start: 2019-10-09 | End: 2019-10-09 | Stop reason: HOSPADM

## 2019-10-09 RX ORDER — MEPERIDINE HYDROCHLORIDE 25 MG/ML
12.5 INJECTION INTRAMUSCULAR; INTRAVENOUS; SUBCUTANEOUS EVERY 10 MIN PRN
Status: DISCONTINUED | OUTPATIENT
Start: 2019-10-09 | End: 2019-10-09 | Stop reason: HOSPADM

## 2019-10-09 RX ORDER — HYDROMORPHONE HYDROCHLORIDE 1 MG/ML
INJECTION, SOLUTION INTRAMUSCULAR; INTRAVENOUS; SUBCUTANEOUS AS NEEDED
Status: DISCONTINUED | OUTPATIENT
Start: 2019-10-09 | End: 2019-10-09 | Stop reason: SURG

## 2019-10-09 RX ORDER — LIDOCAINE HYDROCHLORIDE 10 MG/ML
INJECTION, SOLUTION INFILTRATION; PERINEURAL AS NEEDED
Status: DISCONTINUED | OUTPATIENT
Start: 2019-10-09 | End: 2019-10-09 | Stop reason: SURG

## 2019-10-09 RX ORDER — ACETAMINOPHEN 325 MG/1
650 TABLET ORAL EVERY 4 HOURS PRN
Status: DISCONTINUED | OUTPATIENT
Start: 2019-10-09 | End: 2019-10-10 | Stop reason: HOSPADM

## 2019-10-09 RX ORDER — NEOSTIGMINE METHYLSULFATE 1 MG/ML
INJECTION INTRAVENOUS AS NEEDED
Status: DISCONTINUED | OUTPATIENT
Start: 2019-10-09 | End: 2019-10-09 | Stop reason: SURG

## 2019-10-09 RX ORDER — SODIUM CHLORIDE, SODIUM LACTATE, POTASSIUM CHLORIDE, CALCIUM CHLORIDE 600; 310; 30; 20 MG/100ML; MG/100ML; MG/100ML; MG/100ML
INJECTION, SOLUTION INTRAVENOUS CONTINUOUS
Status: ACTIVE | OUTPATIENT
Start: 2019-10-09 | End: 2019-10-09

## 2019-10-09 RX ORDER — PROPOFOL 10 MG/ML
INJECTION, EMULSION INTRAVENOUS CONTINUOUS PRN
Status: DISCONTINUED | OUTPATIENT
Start: 2019-10-09 | End: 2019-10-09 | Stop reason: SURG

## 2019-10-09 RX ORDER — SODIUM CHLORIDE 9 MG/ML
INJECTION, SOLUTION INTRAVENOUS CONTINUOUS
Status: ACTIVE | OUTPATIENT
Start: 2019-10-09 | End: 2019-10-10

## 2019-10-09 RX ORDER — HYDROXYCHLOROQUINE SULFATE 200 MG/1
400 TABLET, FILM COATED ORAL 2 TIMES DAILY
Status: DISCONTINUED | OUTPATIENT
Start: 2019-10-09 | End: 2019-10-10

## 2019-10-09 RX ORDER — ONDANSETRON HYDROCHLORIDE 2 MG/ML
INJECTION, SOLUTION INTRAVENOUS AS NEEDED
Status: DISCONTINUED | OUTPATIENT
Start: 2019-10-09 | End: 2019-10-09 | Stop reason: SURG

## 2019-10-09 RX ORDER — KETAMINE HYDROCHLORIDE 10 MG/ML
INJECTION, SOLUTION INTRAMUSCULAR; INTRAVENOUS AS NEEDED
Status: DISCONTINUED | OUTPATIENT
Start: 2019-10-09 | End: 2019-10-09 | Stop reason: SURG

## 2019-10-09 RX ORDER — DEXAMETHASONE SODIUM PHOSPHATE 4 MG/ML
INJECTION, SOLUTION INTRA-ARTICULAR; INTRALESIONAL; INTRAMUSCULAR; INTRAVENOUS; SOFT TISSUE AS NEEDED
Status: DISCONTINUED | OUTPATIENT
Start: 2019-10-09 | End: 2019-10-09 | Stop reason: SURG

## 2019-10-09 RX ORDER — MIDAZOLAM HYDROCHLORIDE 2 MG/2ML
INJECTION, SOLUTION INTRAMUSCULAR; INTRAVENOUS AS NEEDED
Status: DISCONTINUED | OUTPATIENT
Start: 2019-10-09 | End: 2019-10-09 | Stop reason: SURG

## 2019-10-09 RX ORDER — DEXTROSE 50 % IN WATER (D50W) INTRAVENOUS SYRINGE
25 AS NEEDED
Status: DISCONTINUED | OUTPATIENT
Start: 2019-10-09 | End: 2019-10-10 | Stop reason: HOSPADM

## 2019-10-09 RX ORDER — OXYCODONE HYDROCHLORIDE 5 MG/1
5-10 TABLET ORAL EVERY 4 HOURS PRN
Status: DISCONTINUED | OUTPATIENT
Start: 2019-10-09 | End: 2019-10-10

## 2019-10-09 RX ADMIN — LABETALOL 20 MG/4 ML (5 MG/ML) INTRAVENOUS SYRINGE 10 MG: at 11:56

## 2019-10-09 RX ADMIN — SODIUM CHLORIDE 2 G: 9 INJECTION, SOLUTION INTRAVENOUS at 20:32

## 2019-10-09 RX ADMIN — DEXAMETHASONE SODIUM PHOSPHATE 4 MG: 4 INJECTION, SOLUTION INTRA-ARTICULAR; INTRALESIONAL; INTRAMUSCULAR; INTRAVENOUS; SOFT TISSUE at 11:28

## 2019-10-09 RX ADMIN — HYDROMORPHONE HYDROCHLORIDE 0.5 MG: 1 INJECTION, SOLUTION INTRAMUSCULAR; INTRAVENOUS; SUBCUTANEOUS at 14:43

## 2019-10-09 RX ADMIN — SODIUM CHLORIDE: 9 INJECTION, SOLUTION INTRAVENOUS at 11:02

## 2019-10-09 RX ADMIN — ROCURONIUM BROMIDE 50 MG: 10 INJECTION, SOLUTION INTRAVENOUS at 11:18

## 2019-10-09 RX ADMIN — HYDROMORPHONE HYDROCHLORIDE 0.5 MG: 1 INJECTION, SOLUTION INTRAMUSCULAR; INTRAVENOUS; SUBCUTANEOUS at 11:57

## 2019-10-09 RX ADMIN — SODIUM CHLORIDE, SODIUM GLUCONATE, SODIUM ACETATE, POTASSIUM CHLORIDE AND MAGNESIUM CHLORIDE: 526; 502; 368; 37; 30 INJECTION, SOLUTION INTRAVENOUS at 12:40

## 2019-10-09 RX ADMIN — HYDROMORPHONE HYDROCHLORIDE 0.25 MG: 1 INJECTION, SOLUTION INTRAMUSCULAR; INTRAVENOUS; SUBCUTANEOUS at 14:14

## 2019-10-09 RX ADMIN — SODIUM CHLORIDE: 9 INJECTION, SOLUTION INTRAVENOUS at 17:20

## 2019-10-09 RX ADMIN — Medication 50 MG: at 11:30

## 2019-10-09 RX ADMIN — HYDROMORPHONE HYDROCHLORIDE 0.5 MG: 1 INJECTION, SOLUTION INTRAMUSCULAR; INTRAVENOUS; SUBCUTANEOUS at 14:39

## 2019-10-09 RX ADMIN — HYDROMORPHONE HYDROCHLORIDE 0.25 MG: 1 INJECTION, SOLUTION INTRAMUSCULAR; INTRAVENOUS; SUBCUTANEOUS at 14:07

## 2019-10-09 RX ADMIN — FENTANYL CITRATE 100 MCG: 50 INJECTION, SOLUTION INTRAMUSCULAR; INTRAVENOUS at 11:17

## 2019-10-09 RX ADMIN — ACETAMINOPHEN 975 MG: 325 TABLET, FILM COATED ORAL at 11:02

## 2019-10-09 RX ADMIN — HYDROMORPHONE HYDROCHLORIDE 0.5 MG: 1 INJECTION, SOLUTION INTRAMUSCULAR; INTRAVENOUS; SUBCUTANEOUS at 14:32

## 2019-10-09 RX ADMIN — PROPOFOL 12.5 MCG/KG/MIN: 10 INJECTION, EMULSION INTRAVENOUS at 11:34

## 2019-10-09 RX ADMIN — ONDANSETRON 4 MG: 2 INJECTION INTRAMUSCULAR; INTRAVENOUS at 13:39

## 2019-10-09 RX ADMIN — HYDROMORPHONE HYDROCHLORIDE 0.5 MG: 1 INJECTION, SOLUTION INTRAMUSCULAR; INTRAVENOUS; SUBCUTANEOUS at 14:58

## 2019-10-09 RX ADMIN — HYDROMORPHONE HYDROCHLORIDE 0.25 MG: 1 INJECTION, SOLUTION INTRAMUSCULAR; INTRAVENOUS; SUBCUTANEOUS at 14:01

## 2019-10-09 RX ADMIN — SODIUM CHLORIDE 2 G: 9 INJECTION, SOLUTION INTRAVENOUS at 13:42

## 2019-10-09 RX ADMIN — NEOSTIGMINE METHYLSULFATE 4 MG: 1 INJECTION INTRAVENOUS at 14:12

## 2019-10-09 RX ADMIN — LABETALOL 20 MG/4 ML (5 MG/ML) INTRAVENOUS SYRINGE 5 MG: at 14:07

## 2019-10-09 RX ADMIN — Medication 0.6 MG: at 14:12

## 2019-10-09 RX ADMIN — FENTANYL CITRATE 50 MCG: 50 INJECTION, SOLUTION INTRAMUSCULAR; INTRAVENOUS at 14:39

## 2019-10-09 RX ADMIN — DOCUSATE SODIUM 100 MG: 100 CAPSULE, LIQUID FILLED ORAL at 20:31

## 2019-10-09 RX ADMIN — MIDAZOLAM HYDROCHLORIDE 2 MG: 1 INJECTION, SOLUTION INTRAMUSCULAR; INTRAVENOUS at 11:07

## 2019-10-09 RX ADMIN — OXYCODONE HYDROCHLORIDE 10 MG: 5 TABLET ORAL at 20:31

## 2019-10-09 RX ADMIN — LIDOCAINE HYDROCHLORIDE 10 ML: 10 INJECTION, SOLUTION INFILTRATION; PERINEURAL at 11:17

## 2019-10-09 RX ADMIN — ACETAMINOPHEN 650 MG: 325 TABLET, FILM COATED ORAL at 17:22

## 2019-10-09 RX ADMIN — HYDROMORPHONE HYDROCHLORIDE 0.25 MG: 1 INJECTION, SOLUTION INTRAMUSCULAR; INTRAVENOUS; SUBCUTANEOUS at 13:48

## 2019-10-09 RX ADMIN — PROPOFOL 200 MG: 10 INJECTION, EMULSION INTRAVENOUS at 11:17

## 2019-10-09 RX ADMIN — HYDROMORPHONE HYDROCHLORIDE 0.25 MG: 1 INJECTION, SOLUTION INTRAMUSCULAR; INTRAVENOUS; SUBCUTANEOUS at 23:01

## 2019-10-09 RX ADMIN — OXYCODONE HYDROCHLORIDE 10 MG: 5 TABLET ORAL at 16:33

## 2019-10-09 RX ADMIN — GABAPENTIN 300 MG: 300 CAPSULE ORAL at 11:02

## 2019-10-09 RX ADMIN — FENTANYL CITRATE 50 MCG: 50 INJECTION, SOLUTION INTRAMUSCULAR; INTRAVENOUS at 15:10

## 2019-10-09 RX ADMIN — FENTANYL CITRATE 50 MCG: 50 INJECTION, SOLUTION INTRAMUSCULAR; INTRAVENOUS at 14:41

## 2019-10-09 ASSESSMENT — ENCOUNTER SYMPTOMS: HEADACHES: 1

## 2019-10-09 ASSESSMENT — COGNITIVE AND FUNCTIONAL STATUS - GENERAL
HELP NEEDED FOR PERSONAL GROOMING: 3 - A LITTLE
STANDING UP FROM CHAIR USING ARMS: 3 - A LITTLE
DRESSING REGULAR UPPER BODY CLOTHING: 3 - A LITTLE
CLIMB 3 TO 5 STEPS WITH RAILING: 3 - A LITTLE
MOVING TO AND FROM BED TO CHAIR: 3 - A LITTLE
TOILETING: 3 - A LITTLE
HELP NEEDED FOR BATHING: 3 - A LITTLE
DRESSING REGULAR LOWER BODY CLOTHING: 3 - A LITTLE
WALKING IN HOSPITAL ROOM: 3 - A LITTLE
EATING MEALS: 4 - NONE

## 2019-10-09 ASSESSMENT — LIFESTYLE VARIABLES: TOBACCO_USE: 1

## 2019-10-09 NOTE — ASSESSMENT & PLAN NOTE
No signs of flare up  Pt with autoimmune disorders  preop notes recommending cont plaquenil  Leflunomide held 2 weeks preop and plan to hold 2 weeks post op unless acute flare  Monitor closely.

## 2019-10-09 NOTE — OP NOTE
Operative report    Date of Procedure: 10/9/2019    Preoperative diagnosis: Painful hemiarthroplasty left shoulder T8 4.84 XA glenoid arthrosis left shoulder M1 9.012 biceps tendinitis left shoulder M 75.22    Postoperative diagnosis: Same    Procedure: Revision to total shoulder arthroplasty left shoulder 89065, biceps tenodesis left shoulder 95849    Surgeon: Wisam    Assistant: Yimi Mathis MD    Complications: None    Anesthesia: General    Indications: Patient is a 49-year-old female who had a partial resurfacing hemiarthroplasty of her left shoulder about 10 years ago she has developed glenoid arthrosis and is brought to the operating 3 blood procedure.  She also has biceps tendinitis.    Findings: At the time of surgery passive external rotation with her arm at her side was 40 overhead elevation passively was 160.  Her rotator cuff was completely intact.  Biceps was completely scarred down and inflamed in the bicipital groove.  Therefore performed the operation through a lesser tuberosity osteotomy.  The anterior and inferior capsule were excised.  The remaining capsule was released.  We removed the prior resurfacing arthroplasty and placed a DJ O short stem anatomic implant and native retroversion using a size 12 stem with a 46 x 16 head with the 11.2 position offset inferiorly.  We used a 46 glenoid.  The superior hole exited the glenoid vault.  We cemented only the center hole and therefore did not bone graft the superior hole.  Fit nicely.  Biceps was tenodesed to the upper border of pectoralis major because of severe partial tearing and inflammation.  At the completion of the procedure the patient had 45 or 50 degrees of external rotation with her arm at her side and 170 degrees of elevation.      Description of procedure: After proper written consent was obtained the patient was brought to the operating room placed on the operating table in supine position.  After adequate anesthesia been obtained,  the patient's shoulder was examined under anesthesia and the above findings were noted.  The patient was then brought to the edge the operating table the operating table was placed in a semirecumbent position and the patient's left arm and shoulder were prepped and draped in normal sterile fashion.  Her previous skin incision was utilized.  The incision was carried sharply on the level deep fascia.  We split about a centimeter of the pectoralis major and took it with the cephalic vein and deltoid laterally.  The remaining portion of the pectoralis major was taken medially.  There were dense adhesions between the pectoralis major and underlying conjoined tendon, between the deltoid and underlying humerus, within the subacromial space, and between the subscapularis and in the back of the conjoined tendon.  These adhesions were lysed both bluntly and sharply.  We were then able and we were then able to identify the axillary nerve which was protected throughout the procedure.  The muscular cutaneous nerve was not within our surgical field.  We then retracted conjoined tendon medially and deltoid laterally.  The anterior humeral circumflex vessels were clamped and coagulated.  Biceps was uncovered in the bicipital groove and found to be extremely partially torn and inflamed.  Therefore, we carried the exposure distally to the upper border of pectoralis major and proximally to the supraglenoid tubercle.  The biceps was then tenodesed to the upper border of pectoralis major with 2 Ethibond sutures.  It was then released just proximal to this tenodesis site and was then resected proximally.  A large curved osteotome was used to perform a lesser tuberosity osteotomy.  The osteotomy fragment and the subscapularis reflected off the anterior capsule and three 1 mm Dacron tape sutures were passed around the bone tendon junction clamped.  Subscapularis and lesser tuberosity were then retracted medially.  There was a remnant of  the anterior capsule that had regrown from her prior operation.  This was released from the humerus of the anatomic neck starting superiorly and extending all the way down past the 6 o'clock position, taking care to protect the axillary nerve.  This allowed us to deliver the humerus into the wound with simultaneous abduction extension and external rotation.  All humeral osteophytes were removed and the anatomic neck was marked.  I then exposed the base of the hemiarthroplasty using a combination of a rondure and a saw.  I then used a tuning fork to remove the head from the underlying screw.  I then used the screwdriver to remove the screw.  I then cut the head along the anatomic neck and native retroversion leaving 2 or 3 mm of bone medial to the rotator cuff reflection.  I then sized the humeral head and the 46 x 16 appeared to be the most appropriate size.  The humerus was then retracted posteriorly with a large Fukuda retractor.  The anterior and inferior capsule was excised and it in a reverse double-pronged Bankart retractor was placed on the anterior neck of the scapula.  The labrum was excised circumferentially and the remaining capsule was released.  A blunt Hohmann was placed posterior superiorly.  We at this point had taken cultures from the bicipital groove, the rotator interval, the anterior capsule, the posterior capsule and fluid.  Therefore the patient was given her antibiotics.  We then drilled a guidepin for reaming using a 46 sizer disc and slightly inferiorly tilted it.  I did not drilled a hole through the medial cortex.  We then reamed until we had a concentric surface.  We then placed a peripheral drill guide and drilled the 3 peripheral holes.  We then remove the drill guide and overdrilled the guide pin with a central dural.  I then checked all 4 holes in the superior one exited slightly.  The remaining holes were intact.  We therefore placed a trial component that seem to fit nicely.  We  then remove the trial component, pulse irrigated the holes, dried the holes, and placed pressurized cement within the center hole only and then impacted a 46 glenoid into position.  Seem to fit nicely.  I therefore then redelivered the humerus into the wound.  From a 6 up to a size 12.  We then placed a 12 broach.  A calcar reamer was used to fine-tune the osteotomy surface.  We then trialed the humeral head and found that a 46 x 16 with the 11.2 position offset inferiorly as the most appropriate size and position.  We locked this into place to ensure the remaining osteophytes.  The humerus was then reduced.  We had excellent stability posteriorly with a good endpoint and excellent anterior soft tissue length.  I therefore then redelivered the humerus into the wound.  The trial implant was removed.  It was then replaced with a real implant with a ultra tape suture around its neck.  Once the humerus was impacted into position and reduce, the 2 free ends of the sulfur tape suture were passed through the bone tendon junction.  The deep limbs of the 3 previously placed sutures around the lesser tuberosity were then passed through the osteotomy bed and out the lateral cortex.  The most superior and inferior the sutures were accompanied by the superior and inferior ultra tape sutures.  I then reduce the lesser tuberosity anatomically and tied tied the ultra tape sutures laterally thereby compressing the lesser tuberosity under its footprint.  I then tied to 3 interfragmentary screws sutures and closed the rotator interval with additional 1 mm skin with an additional tape placed laterally.  Digital palpation was again used to verify the integrity of the axillary nerve.  Again copiously pulse irrigated.  The wound was then closed in standard fashion with 2-0 Vicryl in the subcu and a running subcuticular 3-0 Monocryl for skin.  We did place some local anesthetic in the local soft tissues because the patient did not get a  kapil JOHNSON was personally present for the entire operation except for skin closure.

## 2019-10-09 NOTE — PROGRESS NOTES
Orthopaedic Surgery Progress Note    [S]  No acute events postoperatively, resting comfortably.      [O]  Vitals:    10/09/19 1550   BP: 118/60   Pulse: 98   Resp: 12   Temp: 37.4 °C (99.4 °F)   SpO2: 99%         [Physical Exam]  No acute distress    Drain: N/A    LUE:  Inspection: No gross deformity. Skin intact. Dressing clean, dry, and intact. Arm in sling.  Neurovascular: Palpable Radial pulse equal bilaterally. Brisk capillary refill in all fingers. SILT in the Median, Ulnar, and Radial Nerve Distributions  Motor: Firing Anterior Interosseous, Posterior Interosseous, and Ulnar Nerves      [A/P:]   49 y.o. female POD 0 s/p L conversion joel to TSA    - PT/OT  - Abx: Ancef in-house, Doxy for home  - Follow-up OR cx  - NWB operative extremity, /40  - DVT Prophylaxis: Home eliquis  - Analgesia  - Monitor neurovascular status    Dejan Blanton MD

## 2019-10-09 NOTE — PROGRESS NOTES
Hospital Medicine Service -  Daily Progress Note       SUBJECTIVE   Interval History: Pt seen and examined in pacu. Complains of post op pain in her left shoulder. Denies any Cp or SOB, no dizziness or lightheadedness, no N/V.     OBJECTIVE      Vital signs in last 24 hours:  Temp:  [36.7 °C (98 °F)-37.4 °C (99.3 °F)] 37.4 °C (99.3 °F)  Heart Rate:  [84-97] 84  Resp:  [16-26] 22  BP: (114-153)/(60-90) 117/63    Intake/Output Summary (Last 24 hours) at 10/09/19 1544  Last data filed at 10/09/19 1428   Gross per 24 hour   Intake             1300 ml   Output              200 ml   Net             1100 ml       PHYSICAL EXAMINATION      Physical Exam   Constitutional: She is oriented to person, place, and time. No distress.   HENT:   Head: Normocephalic and atraumatic.   Eyes: Pupils are equal, round, and reactive to light. Conjunctivae are normal.   Neck: Neck supple.   Cardiovascular: Normal rate, regular rhythm and normal heart sounds.    Pulmonary/Chest: Effort normal and breath sounds normal. No respiratory distress. She has no wheezes. She has no rales.   Abdominal: Soft. Bowel sounds are normal. She exhibits no distension. There is no tenderness. There is no guarding.   Musculoskeletal:   Left shoulder dressing, in sling/immobilizer   Neurological: She is alert and oriented to person, place, and time.   Skin: Skin is warm and dry. No rash noted.   Psychiatric: She has a normal mood and affect.      LINES, CATHETERS, DRAINS, AIRWAYS, AND WOUNDS   Lines, Drains, Airways, Wounds:  Peripheral IV 10/09/19 Right Wrist (Active)   Number of days: 0       Surgical Incision Shoulder Left (Active)   Number of days: 0       Comments:      LABS / IMAGING / TELE      Labs  preop labs reviewed    Imaging  n/a    ECG/Telemetry  NSR on tele in pacu    ASSESSMENT AND PLAN      Rheumatoid arthritis (CMS/HCC)   Assessment & Plan    No signs of flare up  Pt with autoimmune disorders  preop notes recommending cont  plaquenil  Leflunomide held 2 weeks preop and plan to hold 2 weeks post op unless acute flare  Monitor closely     PVC (premature ventricular contraction)   Assessment & Plan    Cont metoprolol with parameters     Sleep apnea   Assessment & Plan    Does not use CPAP - not covered by insurance?  Monitor on YESSY protocol  Cautious use of narcotics/sedatives     Osteoarthritis of left shoulder   Assessment & Plan    S/p Revision to total shoulder arthroplasty   POD 0  Care, PT/OT per ortho recs  Pain control, bowel regimen  DVT proph  Encouraged incentive spirometry  Follow post op labs          VTE Assessment: Padua    VTE Prophylaxis Plan: per ortho  Code Status: No Order  Estimated Discharge Date: 10/10/2019  Disposition Planning: per ortho     Brad Snell MD  10/9/2019

## 2019-10-09 NOTE — ASSESSMENT & PLAN NOTE
S/p Revision to total shoulder arthroplasty   POD 1  Care, PT/OT per ortho recs  Pain control, bowel regimen  DVT proph  Encouraged incentive spirometry  Labs stable

## 2019-10-09 NOTE — BRIEF OP NOTE
Left Shoulder Revision To Standard Anatomic Total Shoulder Replacement (L) Procedure Note    Procedure:    Left Shoulder Revision To Standard Anatomic Total Shoulder Replacement  CPT(R) Code:  78665 - CO RIKY SHOULDER ARTHRPLSTY HUMERAL&GLENOID COMPNT      Pre-op Diagnosis     * Osteoarthritis of left shoulder, unspecified osteoarthritis type [M19.012]     * Recurrent subluxation of left shoulder [M24.412]       Post-op Diagnosis     * Osteoarthritis of left shoulder, unspecified osteoarthritis type [M19.012]     * Recurrent subluxation of left shoulder [M24.412]    Surgeon(s) and Role:     * Hector Joel MD - Primary    Anesthesia: Choice    Staff:   Circulator: Layla Bishop RN; Gisela Gresham RN; Luzmaria Spicer RN  Scrub Person: Do Rg; Juan Manuel Rai RN; Lubna Lovelace RN    Procedure Details   L joel to TSA    Estimated Blood Loss: 200 mL    Specimens:                  Order Name Source Comment Collection Info Order Time   FLUID CULTURE/SMEAR Shoulder, Left   Hold for 2 weeks Collected By: Hector Joel MD 10/9/2019 12:32 PM   TISSUE CULTURE / SMEAR Shoulder, Left   Please hold for 2 weeks Collected By: Hector Joel MD 10/9/2019  1:44 PM   TISSUE CULTURE / SMEAR Shoulder, Left   Please hold for 2 weeks Collected By: Hector Joel MD 10/9/2019  1:44 PM   TISSUE CULTURE / SMEAR Shoulder, Left   Please hold for 2 weeks Collected By: Hector Joel MD 10/9/2019  1:44 PM   TISSUE CULTURE / SMEAR Shoulder, Left   Please hold for 2 weeks Collected By: Hector Joel MD 10/9/2019  1:44 PM         Drains:      Implants:   Implant Name Type Inv. Item Serial No.  Lot No. LRB No. Used   CEMENT BONE SIMPLEX FULL DOSE - [id number]  CEMENT BONE SIMPLEX FULL DOSE  [employer] [id number] Left 1   CEMENT BONE SIMPLEX W/TOBRAMYCIN - [id number]  CEMENT BONE SIMPLEX W/TOBRAMYCIN  [employer] [id number] Left 1   GLENOID ALL-POLY PEGGED 46MM - [id number]   GLENOID ALL-POLY PEGGED 46MM  [employer] [id number] Left 1   HUMERAL NECK NEUTRAL - [id number]  HUMERAL NECK NEUTRAL  [employer] [id number] Left 1   HUMERAL HEAD OFF SET 46MM X 16MM - [id number]  HUMERAL HEAD OFF SET 46MM X 16MM  [employer] [id number] Left 1   HUMERAL STEM SHORT 12MM X 67MM - [id number]   HUMERAL STEM SHORT 12MM X 67MM   [employer] [id number] Left 1              Complications:  None; patient tolerated the procedure well.           Disposition: PACU - hemodynamically stable.           Condition: stable    Hector Joel MD  Phone Number: [phone]

## 2019-10-09 NOTE — ANESTHESIA PREPROCEDURE EVALUATION
Anesthesia ROS/MED HX    Anesthesia History    Previous anesthetics   History of anesthetic complications  - PONV  Pulmonary    history of tobacco use   Sleep apnea  Neuro/Psych    Headaches   Anxiety   Chronic neuropathic pain   multiple sclerosis  Cardiovascular   hypertension   ECG reviewed, cardiac clearance reviewed, echocardiogram reviewed and stress test reviewed  Abnormal ECG  Comments: H/o cervical fusion with good neck ROM  Hematological    coagulopathy (factor v leiden)  Musculoskeletal   Arthritis  rheumatoid arthritis  Endo/Other  Body Habitus: Obese  ROS/MED HX Comments:    Neurology/Psychology: Reviewed neurology and rheumatology notes for clearance, mention of several autoimmune disorders possibly including peripheral nerves, pt on immunosuppressives      Past Surgical History:   Procedure Laterality Date   • APPENDECTOMY     • CERVICAL FUSION      C4-5-6   •  SECTION     • CHOLECYSTECTOMY     • FOOT SURGERY Left     hads 2 surgeries - one was insertion of screw, one was for a neuroma   • HYSTERECTOMY     • LEFT OOPHORECTOMY      partial left oophorectomy   • RIGHT OOPHORECTOMY     • SALPINGECTOMY Bilateral    • SHOULDER ARTHROSCOPY         Prior to Admission medications    Medication Sig Start Date End Date Taking? Authorizing Provider   cyanocobalamin (VITAMIN B-12) 1,000 mcg/mL injection 1,000 mcg every 30 (thirty) days. Will check with Dr Joel if she can take before surgery as she is due 10-7-19   Yes ProviderAntoni MD   methocarbamol (ROBAXIN) 750 mg tablet Take 750 mg by mouth 4 (four) times a day. Last dose 19 -needs to refill prescription- didn't have money   Yes Antoni Daniel MD   metoprolol succinate XL (TOPROL-XL) 25 mg 24 hr tablet Take 25 mg by mouth once daily.   Yes Antoni Daniel MD   traZODone (DESYREL) 100 mg tablet Take 100 mg by mouth nightly as needed for sleep.   Yes Antoni Daniel MD   cyanocobalamin/cobamamide (B12 SL) Place 3,000  mcg under the tongue once daily.    ProviderAntoni MD   hydroxychloroquine (PLAQUENIL) 200 mg tablet Take 400 mg by mouth 2 (two) times a day. Last dose 9- under direction of Rheumatologist    Antoni Daniel MD   leflunomide (ARAVA) 20 mg tablet Take 20 mg by mouth every morning. Last does 9-16-19 under Rheumatologist direction    Antoni Daniel MD   metoclopramide (REGLAN) 10 mg tablet Take 10 mg by mouth 4 (four) times a day. Takes prn for intermittant GI issues    Antoni Daniel MD   sucralfate (CARAFATE) 100 mg/mL suspension Take 1 g by mouth 4 (four) times a day. Prn- uses for GI issues she gets intermitantly    Antoni Daniel MD   tapentadol (NUCYNTA) 50 mg tablet Take 100 mg by mouth every 6 (six) hours as needed for moderate pain.    Antoni Daniel MD       Physical Exam    Airway   Mallampati: II   TM distance: <3 FB   Neck ROM: full  Cardiovascular - normal   Rhythm: regular   Rate: normal  Pulmonary - normal   clear to auscultation  Other Findings   H/o cervical fusion with good neck ROM  Dental    Teeth Problems: upper dentures, lower dentures and partials        Anesthesia Plan    Plan: general    Technique: general endotracheal     Lines and Monitors: PIV     Airway: oral intubation and video laryngoscope       patient did not smoke on day of surgery  ASA 3  Blood Products:   Use of Blood Products Discussed: No   Anesthetic plan and risks discussed with: patient and adult children  Induction:    intravenous   Postop Plan:   Patient Disposition: inpatient floor planned admission   Pain Management: IV analgesics  Comments:    Plan: After extensive discussion regarding pts several autoimmune disorders and several peripheral neuropathy including numbness/tingling/pain down left arm decision to withhold interscalene block was made and will treat with pre/intra/post pain management. Pt see's a pain management dr as well. Labs reviewed on paper

## 2019-10-09 NOTE — ANESTHESIA PROCEDURE NOTES
Airway  Urgency: elective    Start Time: 10/9/2019 11:21 AM    General Information and Staff    Patient location during procedure: OR  Anesthesiologist: BON BARNARD  Resident/CRNA: JOSE E URBAN  Performed: resident/CRNA     Indications and Patient Condition  Indications for airway management: anesthesia  Sedation level: general  Preoxygenated: yes  Patient position: sniffing  Mask difficulty assessment: 1 - vent by mask    Final Airway Details  Final airway type: endotracheal airway      Successful airway: ETT  Cuffed: yes   Successful intubation technique: video laryngoscopy  Facilitating devices/methods: intubating stylet  Endotracheal tube insertion site: oral  Blade: Teo  Blade size: #3  ETT size: 7.0 mm  Cormack-Lehane Classification: grade I - full view of glottis  Placement verified by: chest auscultation and capnometry   Measured from: lips  ETT to lips (cm): 22  Number of attempts at approach: 1  Number of other approaches attempted: 0  Atraumatic airway insertion

## 2019-10-09 NOTE — ANESTHESIA POSTPROCEDURE EVALUATION
Patient: Tory Sommer    Procedure Summary     Date:  10/09/19 Room / Location:  [facility] OR  / [facility] [place]    Anesthesia Start:  1109 Anesthesia Stop:  1445    Procedure:  Left Shoulder Revision To Standard Anatomic Total Shoulder Replacement (Left Shoulder) Diagnosis:       Osteoarthritis of left shoulder, unspecified osteoarthritis type      Recurrent subluxation of left shoulder      (Osteoarthritis, Anterior Sublux)    Surgeon:  Hector Joel MD Responsible Provider:  Angeles Valencia MD    Anesthesia Type:  general ASA Status:  3          Anesthesia Type: general  PACU Vitals  10/9/2019 1428 - 10/9/2019 1528      10/9/2019 1433 10/9/2019 1435 10/9/2019 1440 10/9/2019 1450    BP: - (!)  153/65 (!)  141/90 114/63    Temp: 37.4 °C (99.3 °F) - - -    Pulse: - 97 93 92    Resp: - (!)  26 (!)  24 (!)  24    SpO2: - 100 % 100 % -              10/9/2019 1452 10/9/2019 1500 10/9/2019 1510 10/9/2019 1520    BP: - 131/69 132/72 117/63    Temp: - - - -    Pulse: - 94 95 84    Resp: - (!)  22 20 16    SpO2: 100 % 100 % 100 % 100 %            Anesthesia Post Evaluation    Pain management: adequate  Patient location during evaluation: PACU  Patient participation: complete - patient participated  Level of consciousness: awake and alert  Cardiovascular status: acceptable  Airway Patency: adequate  Respiratory status: acceptable  Hydration status: acceptable  Anesthetic complications: no

## 2019-10-09 NOTE — PLAN OF CARE
Problem: Patient Care Overview  Goal: Plan of Care Review  Outcome: Ongoing (interventions implemented as appropriate)   10/09/19 8040   Coping/Psychosocial   Plan Of Care Reviewed With patient   Plan of Care Review   Progress improving   Outcome Summary oxycodone for pain. DTV 9400. tolerating PO intake.      Goal: Individualization & Mutuality  Outcome: Ongoing (interventions implemented as appropriate)      Problem: Shoulder Arthroplasty (Adult)  Goal: Signs and Symptoms of Listed Potential Problems Will be Absent, Minimized or Managed (Shoulder Arthroplasty)  Outcome: Ongoing (interventions implemented as appropriate)    Goal: Anesthesia/Sedation Recovery  Outcome: Outcome(s) Achieved Date Met: 10/09/19

## 2019-10-09 NOTE — ASSESSMENT & PLAN NOTE
Does not use CPAP - not covered by insurance?  Monitor on YESSY protocol  Cautious use of narcotics/sedatives.

## 2019-10-10 VITALS
SYSTOLIC BLOOD PRESSURE: 112 MMHG | OXYGEN SATURATION: 99 % | HEART RATE: 95 BPM | RESPIRATION RATE: 18 BRPM | DIASTOLIC BLOOD PRESSURE: 56 MMHG | HEIGHT: 67 IN | TEMPERATURE: 98.1 F | BODY MASS INDEX: 36.26 KG/M2 | WEIGHT: 231 LBS

## 2019-10-10 LAB
ANION GAP SERPL CALC-SCNC: 6 MEQ/L (ref 3–15)
BUN SERPL-MCNC: 9 MG/DL (ref 8–20)
CALCIUM SERPL-MCNC: 8.6 MG/DL (ref 8.9–10.3)
CHLORIDE SERPL-SCNC: 108 MEQ/L (ref 98–109)
CO2 SERPL-SCNC: 22 MEQ/L (ref 22–32)
CREAT SERPL-MCNC: 0.7 MG/DL
ERYTHROCYTE [DISTWIDTH] IN BLOOD BY AUTOMATED COUNT: 12.7 % (ref 11.7–14.4)
GFR SERPL CREATININE-BSD FRML MDRD: >60 ML/MIN/1.73M*2
GLUCOSE SERPL-MCNC: 122 MG/DL (ref 70–99)
HCT VFR BLDCO AUTO: 30.8 %
HGB BLD-MCNC: 10.4 G/DL
MCH RBC QN AUTO: 32.4 PG (ref 28–33.2)
MCHC RBC AUTO-ENTMCNC: 33.8 G/DL (ref 32.2–35.5)
MCV RBC AUTO: 96 FL (ref 83–98)
PDW BLD AUTO: 10.1 FL (ref 9.4–12.3)
PLATELET # BLD AUTO: 288 K/UL
POTASSIUM SERPL-SCNC: 3.5 MEQ/L (ref 3.6–5.1)
RBC # BLD AUTO: 3.21 M/UL (ref 3.93–5.22)
SODIUM SERPL-SCNC: 136 MEQ/L (ref 136–144)
WBC # BLD AUTO: 12.47 K/UL

## 2019-10-10 PROCEDURE — 97166 OT EVAL MOD COMPLEX 45 MIN: CPT | Mod: GO

## 2019-10-10 PROCEDURE — 97162 PT EVAL MOD COMPLEX 30 MIN: CPT | Mod: GP

## 2019-10-10 PROCEDURE — 80048 BASIC METABOLIC PNL TOTAL CA: CPT | Performed by: STUDENT IN AN ORGANIZED HEALTH CARE EDUCATION/TRAINING PROGRAM

## 2019-10-10 PROCEDURE — 63700000 HC SELF-ADMINISTRABLE DRUG: Performed by: STUDENT IN AN ORGANIZED HEALTH CARE EDUCATION/TRAINING PROGRAM

## 2019-10-10 PROCEDURE — 97110 THERAPEUTIC EXERCISES: CPT | Mod: GO

## 2019-10-10 PROCEDURE — 97535 SELF CARE MNGMENT TRAINING: CPT | Mod: GO

## 2019-10-10 PROCEDURE — 63700000 HC SELF-ADMINISTRABLE DRUG: Performed by: NURSE PRACTITIONER

## 2019-10-10 PROCEDURE — 25800000 HC PHARMACY IV SOLUTIONS: Performed by: STUDENT IN AN ORGANIZED HEALTH CARE EDUCATION/TRAINING PROGRAM

## 2019-10-10 PROCEDURE — 99232 SBSQ HOSP IP/OBS MODERATE 35: CPT | Performed by: HOSPITALIST

## 2019-10-10 PROCEDURE — 63600000 HC DRUGS/DETAIL CODE: Performed by: STUDENT IN AN ORGANIZED HEALTH CARE EDUCATION/TRAINING PROGRAM

## 2019-10-10 PROCEDURE — 85027 COMPLETE CBC AUTOMATED: CPT | Performed by: STUDENT IN AN ORGANIZED HEALTH CARE EDUCATION/TRAINING PROGRAM

## 2019-10-10 PROCEDURE — 36415 COLL VENOUS BLD VENIPUNCTURE: CPT | Performed by: STUDENT IN AN ORGANIZED HEALTH CARE EDUCATION/TRAINING PROGRAM

## 2019-10-10 RX ORDER — POTASSIUM CHLORIDE 750 MG/1
40 TABLET, FILM COATED, EXTENDED RELEASE ORAL ONCE
Status: COMPLETED | OUTPATIENT
Start: 2019-10-10 | End: 2019-10-10

## 2019-10-10 RX ORDER — OXYCODONE HYDROCHLORIDE 15 MG/1
15 TABLET ORAL EVERY 4 HOURS PRN
Qty: 30 TABLET | Refills: 0 | Status: SHIPPED | OUTPATIENT
Start: 2019-10-10 | End: 2019-10-15

## 2019-10-10 RX ORDER — CYCLOBENZAPRINE HCL 10 MG
10 TABLET ORAL EVERY 8 HOURS
Status: DISCONTINUED | OUTPATIENT
Start: 2019-10-10 | End: 2019-10-10

## 2019-10-10 RX ORDER — AMOXICILLIN 250 MG
1 CAPSULE ORAL 2 TIMES DAILY
Qty: 60 TABLET | Refills: 0 | Status: SHIPPED | OUTPATIENT
Start: 2019-10-10 | End: 2019-11-09

## 2019-10-10 RX ORDER — LEFLUNOMIDE 20 MG/1
TABLET ORAL
Start: 2019-10-10

## 2019-10-10 RX ORDER — OXYCODONE HYDROCHLORIDE 5 MG/1
5 TABLET ORAL ONCE
Status: COMPLETED | OUTPATIENT
Start: 2019-10-10 | End: 2019-10-10

## 2019-10-10 RX ORDER — POLYETHYLENE GLYCOL 3350 17 G/17G
17 POWDER, FOR SOLUTION ORAL DAILY
Status: DISCONTINUED | OUTPATIENT
Start: 2019-10-10 | End: 2019-10-10 | Stop reason: HOSPADM

## 2019-10-10 RX ORDER — HYDROMORPHONE HYDROCHLORIDE 1 MG/ML
0.5 INJECTION, SOLUTION INTRAMUSCULAR; INTRAVENOUS; SUBCUTANEOUS ONCE AS NEEDED
Status: COMPLETED | OUTPATIENT
Start: 2019-10-10 | End: 2019-10-10

## 2019-10-10 RX ORDER — OXYCODONE HYDROCHLORIDE 5 MG/1
15 TABLET ORAL EVERY 4 HOURS PRN
Status: DISCONTINUED | OUTPATIENT
Start: 2019-10-10 | End: 2019-10-10 | Stop reason: HOSPADM

## 2019-10-10 RX ORDER — AMOXICILLIN 250 MG
1 CAPSULE ORAL 2 TIMES DAILY
Status: DISCONTINUED | OUTPATIENT
Start: 2019-10-10 | End: 2019-10-10 | Stop reason: HOSPADM

## 2019-10-10 RX ORDER — CYCLOBENZAPRINE HCL 10 MG
10 TABLET ORAL EVERY 8 HOURS
Status: DISCONTINUED | OUTPATIENT
Start: 2019-10-10 | End: 2019-10-10 | Stop reason: HOSPADM

## 2019-10-10 RX ORDER — HYDROXYCHLOROQUINE SULFATE 200 MG/1
200 TABLET, FILM COATED ORAL 2 TIMES DAILY
Status: DISCONTINUED | OUTPATIENT
Start: 2019-10-10 | End: 2019-10-10 | Stop reason: HOSPADM

## 2019-10-10 RX ADMIN — METOPROLOL SUCCINATE 25 MG: 25 TABLET, EXTENDED RELEASE ORAL at 05:35

## 2019-10-10 RX ADMIN — SODIUM CHLORIDE 2 G: 9 INJECTION, SOLUTION INTRAVENOUS at 05:35

## 2019-10-10 RX ADMIN — POTASSIUM CHLORIDE 40 MEQ: 750 TABLET, FILM COATED, EXTENDED RELEASE ORAL at 12:00

## 2019-10-10 RX ADMIN — ACETAMINOPHEN 650 MG: 325 TABLET, FILM COATED ORAL at 16:38

## 2019-10-10 RX ADMIN — ACETAMINOPHEN 650 MG: 325 TABLET, FILM COATED ORAL at 12:03

## 2019-10-10 RX ADMIN — HYDROXYCHLOROQUINE SULFATE 200 MG: 200 TABLET, FILM COATED ORAL at 10:30

## 2019-10-10 RX ADMIN — HYDROMORPHONE HYDROCHLORIDE 0.5 MG: 1 INJECTION, SOLUTION INTRAMUSCULAR; INTRAVENOUS; SUBCUTANEOUS at 06:39

## 2019-10-10 RX ADMIN — OXYCODONE HYDROCHLORIDE 5 MG: 5 TABLET ORAL at 09:33

## 2019-10-10 RX ADMIN — CYCLOBENZAPRINE HYDROCHLORIDE 10 MG: 10 TABLET, FILM COATED ORAL at 09:33

## 2019-10-10 RX ADMIN — OXYCODONE HYDROCHLORIDE 15 MG: 5 TABLET ORAL at 12:02

## 2019-10-10 RX ADMIN — OXYCODONE HYDROCHLORIDE 15 MG: 5 TABLET ORAL at 16:38

## 2019-10-10 RX ADMIN — SENNOSIDES AND DOCUSATE SODIUM 1 TABLET: 8.6; 5 TABLET ORAL at 09:33

## 2019-10-10 RX ADMIN — DOCUSATE SODIUM 100 MG: 100 CAPSULE, LIQUID FILLED ORAL at 08:17

## 2019-10-10 RX ADMIN — SODIUM CHLORIDE 2 G: 9 INJECTION, SOLUTION INTRAVENOUS at 13:08

## 2019-10-10 RX ADMIN — OXYCODONE HYDROCHLORIDE 10 MG: 5 TABLET ORAL at 00:32

## 2019-10-10 RX ADMIN — ACETAMINOPHEN 650 MG: 325 TABLET, FILM COATED ORAL at 08:17

## 2019-10-10 RX ADMIN — OXYCODONE HYDROCHLORIDE 10 MG: 5 TABLET ORAL at 08:17

## 2019-10-10 RX ADMIN — POLYETHYLENE GLYCOL 3350 17 G: 17 POWDER, FOR SOLUTION ORAL at 09:33

## 2019-10-10 RX ADMIN — OXYCODONE HYDROCHLORIDE 10 MG: 5 TABLET ORAL at 04:21

## 2019-10-10 RX ADMIN — APIXABAN 5 MG: 5 TABLET, FILM COATED ORAL at 10:30

## 2019-10-10 ASSESSMENT — COGNITIVE AND FUNCTIONAL STATUS - GENERAL
HELP NEEDED FOR PERSONAL GROOMING: 3 - A LITTLE
WALKING IN HOSPITAL ROOM: 4 - NONE
EATING MEALS: 4 - NONE
DRESSING REGULAR UPPER BODY CLOTHING: 3 - A LITTLE
CLIMB 3 TO 5 STEPS WITH RAILING: 4 - NONE
MOVING TO AND FROM BED TO CHAIR: 4 - NONE
HELP NEEDED FOR BATHING: 3 - A LITTLE
DRESSING REGULAR LOWER BODY CLOTHING: 3 - A LITTLE
TOILETING: 3 - A LITTLE
STANDING UP FROM CHAIR USING ARMS: 4 - NONE

## 2019-10-10 NOTE — HOSPITAL COURSE
Tory is a 49 y.o. female admitted on 10/9/2019 with Osteoarthritis of left shoulder, unspecified osteoarthritis type [M19.012]  Recurrent subluxation of left shoulder [M24.412]  Osteoarthritis of left shoulder, unspecified osteoarthritis type [M19.012]. Principal problem is No Principal Problem: There is no principal problem currently on the Problem List. Please update the Problem List and refresh..    Tory has a past medical history of Anemia; Anxiety attack; Disc disorder; Endometriosis; Migraines; Multiple sclerosis (CMS/HCC); PLMD (periodic limb movement disorder); PVC (premature ventricular contraction); Rheumatoid arthritis (CMS/HCC); RLS (restless legs syndrome); Shoulder pain; and Sleep apnea.     S/P left conversion joel to TSA 10/9

## 2019-10-10 NOTE — PROGRESS NOTES
Patient smoked one pack of cigarettes a day for the past 30 years. She attempted quitting many times over the years and the longest time she was smoke free was one year. She recognizes the importance of stopping for her health and family. She plans to stop smoking once she leaves the hospital. She recognizes she may need support and was accepting of the informational resources presented. Since she lives a distance from our health system, she will look into smoking cessation classes offered closer to her home. She agreed to a follow-up consult.

## 2019-10-10 NOTE — PROGRESS NOTES
Patient: Tory Sommer  Location: [facility] 5PAV 5414  MRN: [id number]  Today's date: 10/10/2019    Pt returned to recliner, hand off to OT for ADL training    Hospital Course  Tory is a 49 y.o. female admitted on 10/9/2019 with Osteoarthritis of left shoulder, unspecified osteoarthritis type [M19.012]  Recurrent subluxation of left shoulder [M24.412]  Osteoarthritis of left shoulder, unspecified osteoarthritis type [M19.012]. Principal problem is No Principal Problem: There is no principal problem currently on the Problem List. Please update the Problem List and refresh..    Tory has a past medical history of Anemia; Anxiety attack; Disc disorder; Endometriosis; Migraines; Multiple sclerosis (CMS/HCC); PLMD (periodic limb movement disorder); PVC (premature ventricular contraction); Rheumatoid arthritis (CMS/HCC); RLS (restless legs syndrome); Shoulder pain; and Sleep apnea.     S/P left conversion joel to TSA 10/9          Therapy Pain/Vitals - 10/10/19 0950        Pain/Comfort/Sleep    Pain Charting Type Pain Assessment    Presence of Pain complains of pain/discomfort    Preferred Pain Scale number (Numeric Rating Pain Scale)    Pain Body Location - Side Left    Pain Body Location shoulder    Pain Rating (0-10): Rest 5    Pain Rating (0-10): Activity 5    Pain Management Interventions premedicated for activity       Vital Signs    Pulse 86    SpO2 99 %    Patient Activity At rest    Oxygen Therapy None (Room air)    /68    BP Method Automatic    Patient Position Sitting          Prior Living Environment      Most Recent Value   Lives With  child(meghna), adult   Living Arrangements  apartment   Living Environment Comment  2nd floor apt, no elevator, 2+ FF steps with HR left side, stall shower'   Equipment Currently Used at Home  none          Prior Level of Function      Most Recent Value   Ambulation  independent   Transferring  independent   Toileting  independent   Bathing  independent    Dressing  independent   Eating  independent   Communication  understands/communicates without difficulty   Swallowing  swallows foods/liquids without difficulty   Equipment Currently Used at Home  none   Prior Functional Level Comment  Pt lives in an apt with adult children, independent prior to surgery                PT Evaluation - 10/10/19 0944        Session Details    Document Type initial evaluation    Mode of Treatment physical therapy    Patient/Family Observations Pt received OOB seated in chair, daugther present       Time Calculation    Start Time 0944    Stop Time 0959    Time Calculation (min) 15 min       General Information    Patient Profile Reviewed? yes    Onset of Illness/Injury or Date of Surgery 10/09/19    Referring Physician Wisam    Existing Precautions/Restrictions weight bearing;other (see comments);range of motion;brace worn at all times    flex, 40 external rotation       Weight Bearing Status    Left UE Weight Bearing Status non-weight bearing       Orientation Log    Kind of Place 3-->spontaneous/free recall    Name of [facility] 3-->spontaneous/free recall    Month 3-->spontaneous/free recall    Date 3-->spontaneous/free recall    Year 3-->spontaneous/free recall    Day of Week 3-->spontaneous/free recall       Cognition/Psychosocial    Safety Awareness intact       Attention    Behavioral Observations WFL       Sensory    Sensory General Assessment no sensation deficits identified       Range of Motion (ROM)    General Range of Motion no range of motion deficits identified       Manual Muscle Testing (MMT)    General MMT Assessment no strength deficits identified       Bed Mobility    Bed Mobility other (see comments)    Comment (Bed Mobility) pt received OOB seated in chair       Transfers    Maintains Weight Bearing Status (Transfers) able to maintain weight bearing status       Sit to Stand Transfer    [place], Sit to Stand Transfer independent       Stand to Sit  Transfer    [place], Stand to Sit Transfer independent       Gait Analysis/Training    Gait/Stairs Locomotion Gait Training (Group)       Gait Training    [place], Gait independent    Assistive Device other (see comments)   none    Distance in Feet 300 feet    Gait Pattern Utilized step-through    Gait Deviations Identified decreased yosvany;decreased gait speed    Maintains Weight Bearing Status able to maintain weight bearing status    Comment steady gait, no LOB       Stairs Training    [place], Stairs modified independence    Assistive Device railing    Handrail Location left side (ascending);right side (descending)   side step- right UE  on left rail    Number of Stairs 10    Stair Height 6 inches    Ascending Stairs Technique step-to-step    Descending Stairs Technique step-to-step    Comment educated patient on side step technique when ascending/descending steps in order to  left rail using RUE, good carry over of technique, no difficulty       AM-PAC (TM) - Mobility (Current Function)    Turning from your back to your side while in a flat bed without using bedrails? 4 - None    Moving from lying on your back to sitting on the side of a flat bed without using bedrails? 4 - None    Moving to and from a bed to a chair? 4 - None    Standing up from a chair using your arms? 4 - None    To walk in a hospital room? 4 - None    Climbing 3-5 steps with a railing? 4 - None    AM-PAC (TM) Mobility Score 24       PT Clinical Impression    Anticipated Equipment Needs at Discharge none    PT Recommended Discharge Disposition home with assist    Daily Outcome Statement 10/10 Pt is a 49 year old female who presents s/p left conversion joel to TSA post op day one; patient ambulating independently, no difficulty negotiating steps, pain level more controlled, no further skilled PT needs identified, anticipate DC home w/ assist when medically cleared          Pain Assessment/Intervention  Pain Charting  Type: Pain Assessment             Education provided this session. See the Patient Education summary report for full details.

## 2019-10-10 NOTE — PLAN OF CARE
Problem: Patient Care Overview  Goal: Discharge Needs Assessment  Outcome: Ongoing (interventions implemented as appropriate)   10/10/19 4290   [place] Needs Assessment   Concerns To Be Addressed no discharge needs identified;denies needs/concerns at this time   Readmission Within The Last 30 Days no previous admission in last 30 days   Anticipated Discharge Disposition home with assistance   Equipment Needed After Discharge none   Current Discharge Risk dependent with mobility/activities of daily living   Current Health   Anticipated Changes Related to Illness none   Activity/Self Care ROS   Equipment Currently Used at Home none   Met with patient. Independent with adls prior to admission. Lives in apartment with daughter. Plans to return home with assist of daughter. Eliquis prescription faxed to [facility]. Medication in stock. Co-pay 20 $. Patient aware. Daughter at bedside. No discharge planning needs identified at this time.

## 2019-10-10 NOTE — PLAN OF CARE
Problem: Patient Care Overview  Goal: Plan of Care Review  Outcome: Ongoing (interventions implemented as appropriate)   10/10/19 5321   Coping/Psychosocial   Plan Of Care Reviewed With patient;daughter   Plan of Care Review   Outcome Summary Pt is independent for mobility, PT to sign off

## 2019-10-10 NOTE — PROGRESS NOTES
Orthopaedic Surgery Progress Note    [S]  Had significant pain overnight. Received 1 dose of 0.25 mg dilaudid overnight, 10 mg oxycodone q4 hrs. Reports that she sees a pain management Dr. Dario Guerra and takes Nucynta 100 mg QIDas well as a muscle relaxant      [O]  Vitals:    10/10/19 0810   BP: (!) 129/58   Pulse: 85   Resp: 16   Temp: 36.6 °C (97.8 °F)   SpO2: 98%         [Physical Exam]  No acute distress    Drain: N/A    LUE:  Inspection: No gross deformity. Skin intact. Dressing clean, dry, and intact. Arm in sling.  Neurovascular: Palpable Radial pulse equal bilaterally. Brisk capillary refill in all fingers. SILT in the Median, Ulnar, and Radial Nerve Distributions  Motor: Firing Anterior Interosseous, Posterior Interosseous, and Ulnar Nerves      [A/P:]   49 y.o. female POD 1 s/p L conversion joel to TSA    -Will speak to pharmacy and pain management regarding analgesic management. 0.5 mg dilaudid x1 this AM  - PT/OT  - Abx: Ancef in-house, Doxy for home  - Follow-up OR cx  - NWB operative extremity, /40  - DVT Prophylaxis: Home eliquis  - Analgesia  - Monitor neurovascular status    Dejan Blanton MD

## 2019-10-10 NOTE — PLAN OF CARE
Problem: Patient Care Overview  Goal: Plan of Care Review  Outcome: Ongoing (interventions implemented as appropriate)   10/10/19 0554   Coping/Psychosocial   Plan Of Care Reviewed With patient   Plan of Care Review   Progress improving   Outcome Summary OOB with supervision, ambulated halls throughout the night. NWB RUE with sling in place. Voiding without difficulty. Oxycodone ATC with breakthrough dilaudid.      Goal: Individualization & Mutuality  Outcome: Ongoing (interventions implemented as appropriate)   10/10/19 0584   Individualization   Patient Specific Goals Home @ discharge       Problem: Shoulder Arthroplasty (Adult)  Goal: Signs and Symptoms of Listed Potential Problems Will be Absent, Minimized or Managed (Shoulder Arthroplasty)  Outcome: Ongoing (interventions implemented as appropriate)

## 2019-10-10 NOTE — PROGRESS NOTES
Patient: Tory Sommer  Location: [facility] 5PAV 5414  MRN: [id number]  Today's date: 10/10/2019    Patient returned OOB seated in recliner chair with tray, call bell and personal items accessible. NAD. RN aware. Daughter present.    Hospital Course  Tory is a 49 y.o. female admitted on 10/9/2019 with Osteoarthritis of left shoulder, unspecified osteoarthritis type [M19.012]  Recurrent subluxation of left shoulder [M24.412]  Osteoarthritis of left shoulder, unspecified osteoarthritis type [M19.012]. Principal problem is No Principal Problem: There is no principal problem currently on the Problem List. Please update the Problem List and refresh..    Tory has a past medical history of Anemia; Anxiety attack; Disc disorder; Endometriosis; Migraines; Multiple sclerosis (CMS/HCC); PLMD (periodic limb movement disorder); PVC (premature ventricular contraction); Rheumatoid arthritis (CMS/HCC); RLS (restless legs syndrome); Shoulder pain; and Sleep apnea.     S/P left conversion joel to TSA 10/9          Therapy Pain/Vitals     Row Name 10/10/19 0950 10/10/19 1001          Pain Charting Type Pain Assessment Pain Assessment    Presence of Pain complains of pain/discomfort complains of pain/discomfort    Preferred Pain Scale number (Numeric Rating Pain Scale) number (Numeric Rating Pain Scale)    Pain Body Location - Side Left Left    Pain Body Location shoulder shoulder    Pain Rating (0-10): Rest 5  --    Pain Rating (0-10): Activity 5 8    Pain Management Interventions premedicated for activity position adjusted;premedicated for activity;breathing exercises          Pulse 86  --    SpO2 99 %  --    Patient Activity At rest  --    Oxygen Therapy None (Room air)  --    /68  --    BP Method Automatic  --    Patient Position Sitting  --          Prior Living Environment      Most Recent Value   Lives With  child(meghna), adult   Living Arrangements  apartment   Living Environment Comment  2nd floor apt, no  elevator, 2+ FF steps with HR left side, stall shower'   Equipment Currently Used at Home  none          Prior Level of Function      Most Recent Value   Ambulation  independent   Transferring  independent   Toileting  independent   Bathing  independent   Dressing  independent   Eating  independent   Communication  understands/communicates without difficulty   Swallowing  swallows foods/liquids without difficulty   Equipment Currently Used at Home  none   Prior Functional Level Comment  Pt lives in an apt with adult children, independent prior to surgery   Dominant Hand  right                OT Evaluation - 10/10/19 0936        Session Details    Document Type initial evaluation    Mode of Treatment occupational therapy    Patient/Family Observations Pt received OOB seated in recliner chair, daughter present t/o session       Time Calculation    Start Time 0936    Stop Time 1014    Time Calculation (min) 38 min       General Information    Patient Profile Reviewed? yes    Existing Precautions/Restrictions fall;weight bearing;brace worn at all times;range of motion   140* FF, 40* ER       Weight Bearing Status    Left UE Weight Bearing Status non-weight bearing       Orientation Log    Comment AAOx3       Cognition/Psychosocial    Safety Awareness intact       Attention    Behavioral Observations WFL       Sensory    Sensory General Assessment no sensation deficits identified   denies numbness/tingling in LUE, RUE LT intact       Range of Motion (ROM)    Comment, General Range of Motion LUE NT, RUE WFL       Manual Muscle Testing (MMT)    Comment LUE NT, RUE 4/5 grossly       Bed Mobility    Bed Mobility supine to sit;sit to supine    [place], Supine to Sit modified independence    [place], Sit to Supine minimum assist (75% patient effort)    Assistive Device (Bed Mobility) other (see comments);bed rails   flat bed    Comment (Bed Mobility) educated pt on technique to maintain NWB LUE, pt requiring min A  due to pain during bed mobility, reports daughter willing/able to assist as needed       Transfers    Maintains Weight Bearing Status (Transfers) able to maintain weight bearing status       Sit to Stand Transfer    [place], Sit to Stand Transfer independent    Assistive Device --   no AD    Comment demo'd good use of RUE only to push up from surface to maintain NWB LUE       Stand to Sit Transfer    [place], Stand to Sit Transfer independent    Assistive Device --   no AD    Comment demo'd good use of RUE only for hand placement to return to sitting to maintain NWB LUE       Toilet Transfer    [place], Toilet Transfer modified independence    Assistive Device grab bars/safety frame    Comment demo'd good use of RUE only to maintain NWB LUE       Shower Transfer Training    Comment reviewed safe shower transfer technique, pt receptive and verbalized understanding       Upper Body Dressing    Upper Body Dressing Tasks doff;hospital gown;don;front-opening garment    Upper Body Dressing Position edge of bed sitting    Upper Body Dressing [place] minimum assist (75% or more patient effort)    Concerns (Upper Body Dressing) difficulty utilizing one-handed methods;other (see comments)   pain    Comment reviewed UBD joel compensatory techniques, pt requiring min A to don/doff shirt due to pain; reports daughter willing/able to assist as needed       Lower Body Dressing    Lower Body Dressing Tasks doff;socks;don;underwear;shoes/slippers;pants/bottoms    Lower Body Dressing Position edge of bed sitting    Lower Body Dressing [place] supervision    Concerns (Lower Body Dressing) difficulty utilizing one-handed methods;other (see comments)   pain    Comment reviewed LBD joel compensatory techniques, pt SUP don/doff socks, shoes, underwear, and pants; reports daughter willing/able to assist as needed       Bathing    Comment reviewd bathing compensatory techniques to maintain NWB LUE       Toileting     Comment pt deferring to perform at this time; reviewed compensatory toileting techniques to maintain NWB LUE; pt receptive and verbalized understanding       Grooming    Comment pt deferring to perform at this time; reviewed grooming compensatory techniques; pt receptive and verbalized understanding       UE Supine Therapeutic Exercise    Exercise(s) Performed shoulder flexion;shoulder external rotation    Device other (see comments)   dowel for ER    Exercise Type PROM (passive range of motion);wand exercises    Restrictions 140* FF, 40* ER    Sets/Reps Detail 1x5 reps each exercise; limited this session due to pain    Comment reviewed LUE shoulder PROM exercises as prescribed by Dr. Joel, 140* FF and 40* ER. Pt limited during session due to pain. Pt 25* FF and 10* ER at this time. Educated daughter on how to assist with PROM LUE shoulder exercises.       Support Device    Use sling    Laterality left side    Activity Limitations and Restrictions training given, activity limitations;demonstrates understanding, activity limitations    Donning and [employer] training given in doffing device;training given in donning device;demonstrates proper donning of device;demonstrates proper doffing of device    Wearing Schedule training provided related to device wearing schedule    Comment Educated pt how to don/doff sling and wearing schedule; pt requiring min A to don/doff at this time, reports daughter willing/able to assist       AM-PAC (TM) - ADL (Current Function)    Putting on and taking off regular lower body clothing? 3 - A Little    Bathing? 3 - A Little    Toileting? 3 - A Little    Putting on/taking off regular upper body clothing? 3 - A Little    How much help for taking care of personal grooming? 3 - A Little    Eating meals? 4 - None    AM-PAC (TM) ADL Score 19       OT Clinical Impression    Patient's Goals For Discharge return home;return to all previous roles/activities    Rehab Potential/Prognosis:  Occupational Therapy other (see comments)   no further acute OT needs    OT Frequency of Treatment other (see comments)   no further acute OT needs    Anticipated Equipment Needs at Discharge none    OT Recommended Discharge Disposition home with assist    Daily Outcome Statement OT eval completed. Pt demonstrates safe independent func transfers, requires assist with dressing ADLs, don/doffing sling and PROM exercises. Pt reports daughter willing/able to assist as needed, daughter present t/o sesion and educated provided. Rec d/c home with daughter as assist when medically stable. No further acute OT needs. D/C OT.          Pain Assessment/Intervention  Pain Charting Type: Pain Assessment             Education provided this session. See the Patient Education summary report for full details.

## 2019-10-10 NOTE — DISCHARGE INSTR - OTHER ORDERS
Best wishes on your journey to become a non-smoker. You are doing a good thing for yourself. Should you need assistance, please refer to the informational resources given to you or look into attending a class at a location near your home.

## 2019-10-10 NOTE — PLAN OF CARE
Problem: Patient Care Overview  Goal: Plan of Care Review  Outcome: Ongoing (interventions implemented as appropriate)   10/10/19 2283   Coping/Psychosocial   Plan Of Care Reviewed With patient;daughter   Plan of Care Review   Progress progress toward functional goals as expected   Outcome Summary OT eval completed       Problem: Shoulder Arthroplasty (Adult)  Goal: Signs and Symptoms of Listed Potential Problems Will be Absent, Minimized or Managed (Shoulder Arthroplasty)  Outcome: Ongoing (interventions implemented as appropriate)

## 2019-10-10 NOTE — PROGRESS NOTES
Hospital Medicine Service -  Daily Progress Note       SUBJECTIVE   Interval History: Status post revision of left shoulder arthroplasty  Pain is under control  Denies any chest pain or shortness of breath     OBJECTIVE      Vital signs in last 24 hours:  Temp:  [36 °C (96.8 °F)-37.4 °C (99.4 °F)] 36.6 °C (97.8 °F)  Heart Rate:  [] 85  Resp:  [11-26] 16  BP: (114-153)/(55-90) 129/58    Intake/Output Summary (Last 24 hours) at 10/10/19 1034  Last data filed at 10/10/19 0350   Gross per 24 hour   Intake          2073.33 ml   Output             3100 ml   Net         -1026.67 ml       PHYSICAL EXAMINATION      Physical Exam   General appearance: alert, appears stated age, cooperative, non-toxic  Head: normocephalic, without obvious abnormality, atraumatic  Eyes: conjunctivae clear. PERRL, EOMI's intact.  Lungs: clear to auscultation bilaterally   Heart: regular rate and rhythm, S1, S2 normal,   Abdomen: Nondistended, +BS, soft, non-tender, no masses palpable   Extremities: left shoulder is dressed  Pulses: 2+ and symmetric B/L DP  Neurologic: Alert and oriented X 3, no focal deficits  Skin: intact, no rashes or lesions       LINES, CATHETERS, DRAINS, AIRWAYS, AND WOUNDS   Lines, Drains, Airways, Wounds:  Peripheral IV 10/09/19 Right Forearm (Active)   Number of days: 1       Surgical Incision Shoulder Left (Active)   Number of days: 1       Comments:      LABS / IMAGING / TELE      Labs  Lab Results   Component Value Date    WBC 12.47 (H) 10/10/2019    HGB 10.4 (L) 10/10/2019    HCT 30.8 (L) 10/10/2019     10/10/2019     10/10/2019    K 3.5 (L) 10/10/2019     10/10/2019    CREATININE 0.7 10/10/2019    BUN 9 10/10/2019    CO2 22 10/10/2019       Imaging  I have independently reviewed the pertinent imaging from the last 24 hrs.    ECG/Telemetry  I have independently reviewed the telemetry. No events for the last 24 hours.    ASSESSMENT AND PLAN      Osteoarthritis of left shoulder   Assessment  & Plan    S/p Revision to total shoulder arthroplasty   POD 1  Care, PT/OT per ortho recs  Pain control, bowel regimen  DVT proph  Encouraged incentive spirometry  Labs stable     Rheumatoid arthritis (CMS/HCC)   Assessment & Plan    No signs of flare up  Pt with autoimmune disorders  preop notes recommending cont plaquenil  Leflunomide held 2 weeks preop and plan to hold 2 weeks post op unless acute flare  Monitor closely.     PVC (premature ventricular contraction)   Assessment & Plan    Cont metoprolol with parameters.     Sleep apnea   Assessment & Plan    Does not use CPAP - not covered by insurance?  Monitor on YESSY protocol  Cautious use of narcotics/sedatives.          VTE Assessment: [place]    VTE Prophylaxis Plan: eliquis  Code Status: Full Code  Estimated Discharge Date: 10/10/2019  Disposition Planning: as per ortho     Romulo Barclay MD  10/10/2019

## 2019-10-10 NOTE — PATIENT CARE CONFERENCE
Care Progression Rounds Note  Date: 10/10/2019  Time: 10:02 AM     Patient Name: Tory Sommer     Medical Record Number: [id number]   YOB: 1970  Sex: Female      Room/Bed: 5414    Admitting Diagnosis: Osteoarthritis of left shoulder, unspecified osteoarthritis type [M19.012]  Recurrent subluxation of left shoulder [M24.412]  Osteoarthritis of left shoulder, unspecified osteoarthritis type [M19.012]   Admit Date/Time: 10/9/2019  9:01 AM    Primary Diagnosis: No Principal Problem: There is no principal problem currently on the Problem List. Please update the Problem List and refresh.  Principal Problem: No Principal Problem: There is no principal problem currently on the Problem List. Please update the Problem List and refresh.    GMLOS: pending  Anticipated Discharge Date: 10/10/2019    AM-PAC  Mobility Score: 18    Discharge Planning:  Concerns To Be Addressed: no discharge needs identified    Barriers to Discharge:  Barriers to Discharge: Therapy update pending, Medical issues not resolved    Participants:  advanced practice provider, , nursing, physical therapy, social work/services

## 2019-10-23 LAB
GRAM STN SPEC: NORMAL
MICROORGANISM SPEC CULT: NORMAL

## 2020-01-21 DIAGNOSIS — M62.838 MUSCLE SPASMS OF BOTH LOWER EXTREMITIES: ICD-10-CM

## 2020-01-21 DIAGNOSIS — R29.898 WEAKNESS OF BOTH LOWER EXTREMITIES: ICD-10-CM

## 2020-01-21 RX ORDER — METHOCARBAMOL 750 MG/1
750 TABLET, FILM COATED ORAL EVERY 8 HOURS SCHEDULED
Qty: 90 TABLET | Refills: 1 | Status: SHIPPED | OUTPATIENT
Start: 2020-01-21 | End: 2022-06-08 | Stop reason: SDUPTHER

## 2020-01-21 NOTE — TELEPHONE ENCOUNTER
Pt called requesting robaxin refill  Pt states that it works really well  Rx entered  If agreeable, pls sign off

## 2020-08-25 ENCOUNTER — TELEPHONE (OUTPATIENT)
Dept: NEUROLOGY | Facility: CLINIC | Age: 50
End: 2020-08-25

## 2020-08-25 NOTE — TELEPHONE ENCOUNTER
[facility] (8/19/2020 FAXED PHYS ORD REQ TO PCP)/JT  8/25/2020 LMOM AT 9:03AM FOR PCP OFFICE TO RETURN MY CALL-FOLLOWING UP ON PHYS ORDER

## 2020-08-25 NOTE — TELEPHONE ENCOUNTER
LMOM for pt to call back to confirm scheduled appointment for tomorrow at 9:25am with Dr Jemal Ling [place] [facility] [place]

## 2020-08-26 ENCOUNTER — OFFICE VISIT (OUTPATIENT)
Dept: NEUROLOGY | Facility: CLINIC | Age: 50
End: 2020-08-26
Payer: COMMERCIAL

## 2020-08-26 VITALS
SYSTOLIC BLOOD PRESSURE: 141 MMHG | WEIGHT: 220.3 LBS | TEMPERATURE: 97.9 F | HEIGHT: 67 IN | RESPIRATION RATE: 16 BRPM | BODY MASS INDEX: 34.58 KG/M2 | HEART RATE: 91 BPM | DIASTOLIC BLOOD PRESSURE: 71 MMHG

## 2020-08-26 DIAGNOSIS — R42 DIZZINESS AND GIDDINESS: ICD-10-CM

## 2020-08-26 DIAGNOSIS — M54.2 CHRONIC NECK PAIN: ICD-10-CM

## 2020-08-26 DIAGNOSIS — U07.1 REAL TIME REVERSE TRANSCRIPTASE PCR POSITIVE FOR COVID-19 VIRUS: Primary | ICD-10-CM

## 2020-08-26 DIAGNOSIS — R90.89 OTHER ABNORMAL FINDINGS ON DIAGNOSTIC IMAGING OF CENTRAL NERVOUS SYSTEM: ICD-10-CM

## 2020-08-26 DIAGNOSIS — R29.898 WEAKNESS OF BOTH LOWER EXTREMITIES: ICD-10-CM

## 2020-08-26 DIAGNOSIS — R20.0 NUMBNESS IN BOTH LEGS: ICD-10-CM

## 2020-08-26 DIAGNOSIS — G89.29 CHRONIC NECK PAIN: ICD-10-CM

## 2020-08-26 PROCEDURE — 99215 OFFICE O/P EST HI 40 MIN: CPT | Performed by: PSYCHIATRY & NEUROLOGY

## 2020-08-26 NOTE — PROGRESS NOTES
Review of Systems   Constitutional: Positive for appetite change and fatigue  Negative for fever  HENT: Positive for tinnitus  Negative for hearing loss, trouble swallowing and voice change  Eyes: Positive for visual disturbance (blurry vision)  Negative for photophobia and pain  Respiratory: Negative  Negative for shortness of breath  Cardiovascular: Negative  Negative for palpitations  Gastrointestinal: Positive for nausea  Negative for vomiting  Endocrine: Negative  Negative for cold intolerance  Genitourinary: Negative  Negative for dysuria, frequency and urgency  Musculoskeletal: Positive for gait problem  Negative for myalgias and neck pain  Skin: Negative  Negative for rash  Allergic/Immunologic: Negative  Neurological: Positive for dizziness, tremors, speech difficulty (slurred), weakness, light-headedness and numbness (fingers and toes and left leg adn across chest)  Negative for seizures, syncope, facial asymmetry and headaches  Vibrations on chest, left leg and arms  Trouble gathering thoughts and trouble finding words   Hematological: Negative  Does not bruise/bleed easily  Psychiatric/Behavioral: Positive for confusion and sleep disturbance  Negative for hallucinations  The patient is nervous/anxious

## 2020-08-26 NOTE — PROGRESS NOTES
[facility] MULTIPLE SCLEROSIS CENTER  PATIENT:  Thanh Ferris  MRN:  [id number]  :  1970  DATE OF SERVICE:  2020    Assessment/Plan:           Problem List Items Addressed This Visit        Nervous and Auditory    Weakness of both lower extremities       Other    Other abnormal findings on diagnostic imaging of central nervous system    Numbness in both legs    Chronic neck pain    Real time reverse transcriptase PCR positive for COVID-19 virus - Primary          Mrs Ting Salinas has presented to [address] [facility] multiple [address] for follow-up on multiple neurological complaint  Patient's main complaint today is 3 month long dizziness and headaches since she was diagnosed with coronavirus disease; patient describes she did have involvement of the central nervous system with long and GI tract with positive findings reported in May 2020  Since that time, patient describes dizziness and lightheadedness and difficulties driving  MRI brain was completed at [facility] with no acute or remote stroke, no hemorrhage, no other significant changes except minimal small vessel disease appreciated considering she also has flare-up of underlying rheumatological conditions  Patient has been taking Plaquenil with leflunomide and prednisone, with positive SARS-CoV-2 in May 2020 and not completely improving, I did bring concern for prolonged viral shedding, as we see it in MS patients with COVID 19 disease  I would agree, patient is to continue meclizine with no further advised from my standpoint, with no progression of white matter changes noted to be concern for converting to MS and secondary vasculitis  - patient has established care with Dr Krista Menendez, no concern for MS after comprehensive evaluation     - prior examination is abnormal and does abnormalities in multiple muscle groups of the L4-L5 S1 region  L4-L5 did fusion   Patint follows with Dr Joie Perez     - SARS-CoV-2 was detected in May 2020- second test is pending, hoping evaluating neutralizing antibodies  FU 6 months  Subjective: dizziness       HPI    Mrs  Ezra Catherine has a history of 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, Difficulty swallowing, left cervical radiculopathy, left shoulder replacement, lumbar spondylosis, Factor V Leiden syndrome, Factor VIII deficiency, Fever of unknown origin, Pernicious anemia, RA, atrophic gastritis, who presented for follow up on lower extremities weakness and visual disturbances with lumbar non-bacterial arachnoiditis  EMG/NCS was completed in April 2018 - bilateral lower extremities has normal electrophysiological findings  Patient has been having multiple neurologic complaints with known autoimmune disorders along with cervical spondylosis s/p fusion  Patient stopped 2 immunosuppressive therapies in 2019, she may develop relapses of her RA, including worsening sensory and motor dysfunction  Patient was scheduled with Dr Ambar Duffy in June 2019, patient did not follow with provider in October 2019  MRI lumbar spine 6/2019: Diminished size of small central disc protrusion without overt neural element impingement     Multilevel degenerative spondylosis     No pathologic enhancement  MRI C-spine 7/16/2019: Spondylotic degenerative disease results in moderate left foraminal narrowing at C6-7  Correlate for left C7 radiculopathy      Status post ACDF C4-C6 with typical postoperative alignment  ER visit to [facility] on 8/20/2020 was completed evaluation for dizziness, ataxia, immunocompromized, blurry vision, Factor 5 Leiden, RA, Non-intractable headache, Sjogren's disease  In the emergency department her labs were essentially unremarkable except for a mild leukocytosis  Head CT was negative for acute changes  Patient reported worsening dizziness in the past week aggravated by turning head to left, seeing things in periphery, and standing up too quickly   No coordination, sensory, motor or cranial nerve deficits noted on neuro exam  [place]-hallpike was performed and was negative  Dr Connor Escobar followed the patient for chronic bilateral lower back pain  Patient has SARS -CoV-2 detected on 2020 and not detected on 2020  The following portions of the patient's history were reviewed and updated as appropriate: She  has a past medical history of Atrophy of vagina, Avascular necrosis ([facility] [address] ), Depression, Easy bruisability, Endometriosis, Factor V Leiden mutation ([facility] [address] ), migraines, Lichen sclerosus, Ovarian cyst, Pelvic pain, Periodic limb movement disorder, and Peripheral neuropathy  She   Patient Active Problem List    Diagnosis Date Noted    Real time reverse transcriptase PCR positive for COVID-19 virus 2020    Numbness and tingling in left arm 2019    Chronic neck pain 2019    Muscle spasms of both lower extremities 2019    Pernicious anemia 2019    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 2019    Other abnormal findings on diagnostic imaging of central nervous system 10/24/2018    Weakness of both lower extremities 10/24/2018    Dysphagia 10/24/2018    Numbness in both legs 10/24/2018    Other spondylosis with radiculopathy, lumbar region 10/24/2018     She  has a past surgical history that includes Hysterectomy; Appendectomy;  section; Laparoscopic ovarian cystectomy; and [place] lumbar puncture diagnostic (2018)  Her family history is not on file  She  reports that she has been smoking  She has a 30 00 pack-year smoking history  She has never used smokeless tobacco  She reports that she does not drink alcohol or use drugs    Current Outpatient Medications   Medication Sig Dispense Refill    Butalbital-APAP-Caffeine -40 MG CAPS as needed      Cobalamine Combinations (B-12) 1000-400 MCG SUBL Inject into a muscle every 30 (thirty) days        hydroxychloroquine (PLAQUENIL) 200 mg tablet Take 200 mg by mouth 2 (two) times a day with meals        leflunomide (ARAVA) 20 MG tablet 20 mg daily        methocarbamol (ROBAXIN) 750 mg tablet Take 1 tablet (750 mg total) by mouth every 8 (eight) hours 90 tablet 1    metoclopramide (REGLAN) 10 mg tablet Take 10 mg by mouth as needed      metoprolol succinate (TOPROL-XL) 25 mg 24 hr tablet Take 25 mg by mouth daily  3    NUCYNTA tablet 100 mg 4 (four) times a day as needed    0    omeprazole (PriLOSEC) 10 mg delayed release capsule Take 10 mg by mouth as needed      sucralfate (CARAFATE) 1 g/10 mL suspension Take by mouth as needed      sulfamethoxazole-trimethoprim (BACTRIM) 400-80 mg per tablet Take 2 tablets by mouth every other day      traZODone (DESYREL) 100 mg tablet Take 100 mg by mouth daily at bedtime        gabapentin (NEURONTIN) 300 mg capsule Take 1 capsule (300 mg total) by mouth 3 (three) times a day (Patient not taking: Reported on 8/28/2019) 90 capsule 3     No current facility-administered medications for this visit        Current Outpatient Medications on File Prior to Visit   Medication Sig    Butalbital-APAP-Caffeine -40 MG CAPS as needed    Cobalamine Combinations (B-12) 1000-400 MCG SUBL Inject into a muscle every 30 (thirty) days      hydroxychloroquine (PLAQUENIL) 200 mg tablet Take 200 mg by mouth 2 (two) times a day with meals      leflunomide (ARAVA) 20 MG tablet 20 mg daily      methocarbamol (ROBAXIN) 750 mg tablet Take 1 tablet (750 mg total) by mouth every 8 (eight) hours    metoclopramide (REGLAN) 10 mg tablet Take 10 mg by mouth as needed    metoprolol succinate (TOPROL-XL) 25 mg 24 hr tablet Take 25 mg by mouth daily    NUCYNTA tablet 100 mg 4 (four) times a day as needed      omeprazole (PriLOSEC) 10 mg delayed release capsule Take 10 mg by mouth as needed    sucralfate (CARAFATE) 1 g/10 mL suspension Take by mouth as needed    sulfamethoxazole-trimethoprim (BACTRIM) 400-80 mg per tablet Take 2 tablets by mouth every other day    traZODone (DESYREL) 100 mg tablet Take 100 mg by mouth daily at bedtime      gabapentin (NEURONTIN) 300 mg capsule Take 1 capsule (300 mg total) by mouth 3 (three) times a day (Patient not taking: Reported on 8/28/2019)     No current facility-administered medications on file prior to visit  She is allergic to morphine; other; and nitrofurantoin            Objective:    Blood pressure 141/71, pulse 91, temperature 97 9 °F (36 6 °C), temperature source Temporal, resp  rate 16, height 5' 7" (1 702 m), weight 99 9 kg (220 lb 4 8 oz)  Physical Exam    Neurological Exam  CONSTITUTIONAL: NAD, pleasant  NECK: supple, no lymphadenopathy, no thyromegaly, no JVD  CARDIOVASCULAR: RRR, normal S1S2, no murmurs, no rubs  RESP: clear to auscultation bilaterally, no wheezes/rhonchi/rales  ABDOMEN: soft, non tender, non distended  SKIN: no rash or skin lesions  EXTREMITIES: no edema, pulses 2+bilaterally  PSYCH: appropriate mood and affect  NEUROLOGIC COMPREHENSIVE EXAM: Patient is oriented to person, place and time, NAD; appropriate affect  CN II, III, IV, V, VI, VII,VIII,IX,X,XI-XII intact with EOMI, PERRLA, OKN intact, VF grossly intact, fundi poorly visualized secondary to pupillary constriction; symmetric face noted  Motor: 5/5 UE/LE bilateral symmetric; Sensory: intact to light touch and pinprick bilaterally; normal vibration sensation feet bilaterally; Coordination within normal limits on FTN and MARSHA testing; DTR: 2/4 through, no Babinski, no clonus  Tandem gait is intact  Romberg: negative  ROS:  12 points of review of system was reviewed with the patient and was unremarkable with exception: see HPI  Review of Systems    Constitutional: Positive for appetite change and fatigue  Negative for fever  HENT: Positive for tinnitus  Negative for hearing loss, trouble swallowing and voice change  Eyes: Positive for visual disturbance (blurry vision)  Negative for photophobia and pain  Respiratory: Negative  Negative for shortness of breath  Cardiovascular: Negative  Negative for palpitations  Gastrointestinal: Positive for nausea  Negative for vomiting  Endocrine: Negative  Negative for cold intolerance  Genitourinary: Negative  Negative for dysuria, frequency and urgency  Musculoskeletal: Positive for gait problem  Negative for myalgias and neck pain  Skin: Negative  Negative for rash  Allergic/Immunologic: Negative  Neurological: Positive for dizziness, tremors, speech difficulty (slurred), weakness, light-headedness and numbness (fingers and toes and left leg adn across chest)  Negative for seizures, syncope, facial asymmetry and headaches  Vibrations on chest, left leg and arms  Trouble gathering thoughts and trouble finding words   Hematological: Negative  Does not bruise/bleed easily  Psychiatric/Behavioral: Positive for confusion and sleep disturbance  Negative for hallucinations  The patient is nervous/anxious

## 2020-08-27 LAB — EXTERNAL SARS COV-2 ANTIBODY IGG: NEGATIVE

## 2020-12-14 ENCOUNTER — TELEPHONE (OUTPATIENT)
Dept: NEUROLOGY | Facility: CLINIC | Age: 50
End: 2020-12-14

## 2020-12-16 ENCOUNTER — TELEPHONE (OUTPATIENT)
Dept: NEUROLOGY | Facility: CLINIC | Age: 50
End: 2020-12-16

## 2020-12-16 ENCOUNTER — TELEMEDICINE (OUTPATIENT)
Dept: NEUROLOGY | Facility: CLINIC | Age: 50
End: 2020-12-16
Payer: COMMERCIAL

## 2020-12-16 VITALS — WEIGHT: 214 LBS | HEIGHT: 67 IN | BODY MASS INDEX: 33.59 KG/M2

## 2020-12-16 DIAGNOSIS — R42 PERSISTENT POSTURAL-PERCEPTUAL DIZZINESS: Primary | ICD-10-CM

## 2020-12-16 PROBLEM — H81.8X9 PERSISTENT POSTURAL-PERCEPTUAL DIZZINESS: Status: ACTIVE | Noted: 2020-12-16

## 2020-12-16 PROCEDURE — 99214 OFFICE O/P EST MOD 30 MIN: CPT | Performed by: PSYCHIATRY & NEUROLOGY

## 2020-12-16 RX ORDER — VENLAFAXINE HYDROCHLORIDE 37.5 MG/1
TABLET, EXTENDED RELEASE ORAL
Qty: 60 TABLET | Refills: 2 | Status: SHIPPED | OUTPATIENT
Start: 2020-12-16

## 2020-12-16 RX ORDER — BUPRENORPHINE HYDROCHLORIDE 300 UG/1
300 FILM, SOLUBLE BUCCAL 2 TIMES DAILY
COMMUNITY
Start: 2020-10-30

## 2020-12-16 RX ORDER — MECLIZINE HCL 12.5 MG/1
12.5 TABLET ORAL 3 TIMES DAILY PRN
COMMUNITY
End: 2020-12-16 | Stop reason: ALTCHOICE

## 2020-12-16 RX ORDER — BUPROPION HYDROCHLORIDE 150 MG/1
TABLET ORAL
COMMUNITY
Start: 2020-09-22

## 2020-12-17 ENCOUNTER — TELEPHONE (OUTPATIENT)
Dept: PSYCHIATRY | Facility: CLINIC | Age: 50
End: 2020-12-17

## 2020-12-30 ENCOUNTER — TELEPHONE (OUTPATIENT)
Dept: PSYCHIATRY | Facility: CLINIC | Age: 50
End: 2020-12-30

## 2021-06-09 ENCOUNTER — OFFICE VISIT (OUTPATIENT)
Dept: NEUROLOGY | Facility: CLINIC | Age: 51
End: 2021-06-09
Payer: COMMERCIAL

## 2021-06-09 VITALS
DIASTOLIC BLOOD PRESSURE: 58 MMHG | HEART RATE: 75 BPM | SYSTOLIC BLOOD PRESSURE: 110 MMHG | BODY MASS INDEX: 33.81 KG/M2 | HEIGHT: 67 IN | WEIGHT: 215.4 LBS

## 2021-06-09 DIAGNOSIS — R20.0 NUMBNESS IN BOTH LEGS: ICD-10-CM

## 2021-06-09 DIAGNOSIS — G82.20 PARAPARESIS OF BOTH LOWER LIMBS (HCC): ICD-10-CM

## 2021-06-09 DIAGNOSIS — G44.019 EPISODIC CLUSTER HEADACHE, NOT INTRACTABLE: ICD-10-CM

## 2021-06-09 DIAGNOSIS — R29.898 WEAKNESS OF BOTH LOWER EXTREMITIES: Primary | ICD-10-CM

## 2021-06-09 DIAGNOSIS — M62.838 MUSCLE SPASMS OF BOTH LOWER EXTREMITIES: ICD-10-CM

## 2021-06-09 DIAGNOSIS — R20.2 NUMBNESS AND TINGLING IN LEFT ARM: ICD-10-CM

## 2021-06-09 DIAGNOSIS — R42 PERSISTENT POSTURAL-PERCEPTUAL DIZZINESS: ICD-10-CM

## 2021-06-09 DIAGNOSIS — D51.0 PERNICIOUS ANEMIA: ICD-10-CM

## 2021-06-09 DIAGNOSIS — R20.0 NUMBNESS AND TINGLING IN LEFT ARM: ICD-10-CM

## 2021-06-09 PROCEDURE — 99215 OFFICE O/P EST HI 40 MIN: CPT | Performed by: PSYCHIATRY & NEUROLOGY

## 2021-06-09 RX ORDER — SUMATRIPTAN 50 MG/1
50 TABLET, FILM COATED ORAL ONCE AS NEEDED
Qty: 9 TABLET | Refills: 2 | Status: SHIPPED | OUTPATIENT
Start: 2021-06-09

## 2021-06-09 RX ORDER — ERGOCALCIFEROL 1.25 MG/1
50000 CAPSULE ORAL WEEKLY
Qty: 12 CAPSULE | Refills: 0 | Status: SHIPPED | OUTPATIENT
Start: 2021-06-09 | End: 2022-06-08 | Stop reason: SDUPTHER

## 2021-06-09 RX ORDER — VENLAFAXINE 37.5 MG/1
37.5 TABLET ORAL ONCE
COMMUNITY

## 2021-06-09 RX ORDER — APREMILAST 30 MG/1
TABLET, FILM COATED ORAL 2 TIMES DAILY
COMMUNITY
Start: 2021-05-18

## 2021-06-09 RX ORDER — ATORVASTATIN CALCIUM 10 MG/1
10 TABLET, FILM COATED ORAL DAILY
COMMUNITY

## 2021-06-09 RX ORDER — BUPRENORPHINE HYDROCHLORIDE 300 UG/1
300 FILM, SOLUBLE BUCCAL 2 TIMES DAILY
COMMUNITY
Start: 2021-05-06

## 2021-06-09 RX ORDER — VERAPAMIL HYDROCHLORIDE 40 MG/1
40 TABLET ORAL 3 TIMES DAILY
Qty: 60 TABLET | Refills: 2 | Status: SHIPPED | OUTPATIENT
Start: 2021-06-09 | End: 2022-06-08 | Stop reason: SDUPTHER

## 2021-06-09 NOTE — PROGRESS NOTES
[facility] MULTIPLE SCLEROSIS CENTER  PATIENT:  Jaya Gibson  MRN:  [id number]  :  1970  DATE OF SERVICE:  2021    Assessment/Plan:       Problem List Items Addressed This Visit        Nervous and Auditory    Weakness of both lower extremities - Primary    Paraparesis of both lower limbs (HCC)    Relevant Medications    ergocalciferol (VITAMIN D2) 50,000 units       Other    Numbness in both legs    Muscle spasms of both lower extremities    Pernicious anemia    Numbness and tingling in left arm    Relevant Medications    SUMAtriptan (IMITREX) 50 mg tablet    Persistent postural-perceptual dizziness    Relevant Orders    CTA head w wo contrast      Other Visit Diagnoses     Episodic cluster headache, not intractable        Relevant Medications    Otezla 30 MG TABS    Buprenorphine HCl (Belbuca) 300 MCG FILM    venlafaxine (EFFEXOR) 37 5 mg tablet    verapamil (CALAN) 40 mg tablet    SUMAtriptan (IMITREX) 50 mg tablet    ergocalciferol (VITAMIN D2) 50,000 units    Other Relevant Orders    CTA head w wo contrast         Mrs Whit Strickland has presented to  [address] [place] [address] for follow up on dizziness and headaches;  - patient described no new focal neurological deficit since last office visit, with known peripheral polyneuropathy and lower extremity weakness has been previously described, believed to be multifactorial including lumbar radiculopathy and pernicious anemia related neurological dysfunction;  - patient has persistent perceptual dizziness, she feels much better today patient had excellent recover after coronavirus infection;  - patient had described cluster headaches/trigeminal cephalgia which waking her up at 1:00 a m  in the morning and pain described as sharp predominantly in the left temporal region keeping her awake with pain occasionally radiates to the left eye; patient described intermittent tearing;   - patient was offered short trial of verapamil 40 mg twice daily as a preventive measures and sumatriptan 50 mg oral tablet, we agreed patient may require nasal form of triptan to achieve past relieve of her cluster headache, zomig 5 mg NS will be offered as a next abortive treatment;  - patient was recently diagnosed with psoriasis and psoriatic arthritis patient has been off Plaquenil and leflunomide and she recently started [place]; vitamin D supplements was highly recommended;   - patient previously described persistent dizziness and now unilateral severe pain, we agreed patient will benefit from CT angiogram head to rule out underlying vascular pathology; Follow-up with [facility] within 6-12 months  Subjective:dizziness, left side of head pain and headaches;     HPI  Mrs Elise Morris a 52 yo female who presented to [address] for follow up  Patient described developing sharp stabbing pain involving left temporoparietal region with pain radiating to left eye, waking patient up from sleep at 1:00 a m  in the morning or 4:00 a m  in the morning; patient is trying to take coffee as well as taking Excedrin to help with her headaches; during headaches patient reports nausea vomiting soreness in her neck at times tearing  During the day patient also feel pressure in her head; no complete vision loss, no facial asymmetry no vertigo during headache has been reported; Patient has known 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, Difficulty swallowing, left cervical radiculopathy, left shoulder replacement, lumbar spondylosis, Factor V Leiden syndrome, Factor VIII deficiency, Fever of unknown origin, Pernicious anemia, RA, atrophic gastritis  extremities weakness and visual disturbances with lumbar non-bacterial arachnoiditis  EMG/NCS was completed in April 2018 - bilateral lower extremities has normal electrophysiological findings    PPPD diagnosis has been established     Patient has adjustment of her autoimmune dysfunction treatment as she discontinued leflunomide and Plaquenil and recently was started on Otezla for psoriatic arthritis; is   MRI brain : Several scattered supratentorial white matter T2 and FLAIR hyperintense foci   Although nonspecific, findings likely represent mild chronic microangiopathic changes   Similar findings can accompany migraine headaches   Clinical correlation recommended  Normal posterior fossa and IACs  MRI brain 2020: There is been a slight increase in the number of T2 bright lesions in the brain  parenchyma as discussed above  No pathological enhancement  No acute infarct  Patient mention she has not seen PT or Psychiatrist cant afford it; The following portions of the patient's history were reviewed and updated as appropriate:   She  has a past medical history of Atrophy of vagina, Avascular necrosis ([address] ), Depression, Easy bruisability, Endometriosis, Factor V Leiden mutation ([facility] [address] ), migraines, Lichen sclerosus, Ovarian cyst, Pelvic pain, Periodic limb movement disorder, Peripheral neuropathy, and Psoriasis ()  She   Patient Active Problem List    Diagnosis Date Noted    Paraparesis of both lower limbs ([facility] [address] ) 2021    Persistent postural-perceptual dizziness 2020    Real time reverse transcriptase PCR positive for COVID-19 virus 2020    Dizziness and giddiness 2020    Numbness and tingling in left arm 2019    Chronic neck pain 2019    Muscle spasms of both lower extremities 2019    Pernicious anemia 2019    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 2019    Other abnormal findings on diagnostic imaging of central nervous system 10/24/2018    Weakness of both lower extremities 10/24/2018    Dysphagia 10/24/2018    Numbness in both legs 10/24/2018    Other spondylosis with radiculopathy, lumbar region 10/24/2018     She  has a past surgical history that includes Hysterectomy; Appendectomy;   section; Laparoscopic ovarian cystectomy; and [place] lumbar puncture diagnostic (11/7/2018)  Her family history is not on file  She  reports that she has been smoking  She has a 30 00 pack-year smoking history  She has never used smokeless tobacco  She reports that she does not drink alcohol or use drugs  Current Outpatient Medications   Medication Sig Dispense Refill    atorvastatin (LIPITOR) 10 mg tablet Take 10 mg by mouth daily      Buprenorphine HCl (Belbuca) 300 MCG FILM Apply 300 mcg to cheek 2 (two) times a day      buPROPion (WELLBUTRIN XL) 150 mg 24 hr tablet       Cobalamine Combinations (B-12) 1000-400 MCG SUBL Inject into a muscle every 30 (thirty) days        Cyanocobalamin 1000 MCG/ML KIT Inject 1,000 mcg into a muscle every 30 (thirty) days      methocarbamol (ROBAXIN) 750 mg tablet Take 1 tablet (750 mg total) by mouth every 8 (eight) hours 90 tablet 1    metoprolol succinate (TOPROL-XL) 25 mg 24 hr tablet Take 25 mg by mouth daily  3    Otezla 30 MG TABS       venlafaxine (EFFEXOR) 37 5 mg tablet Take 37 5 mg by mouth once      Buprenorphine HCl (Belbuca) 300 MCG FILM Apply 300 mcg to cheek 2 (two) times a day      ergocalciferol (VITAMIN D2) 50,000 units Take 1 capsule (50,000 Units total) by mouth once a week 12 capsule 0    SUMAtriptan (IMITREX) 50 mg tablet Take 1 tablet (50 mg total) by mouth once as needed for migraine for up to 1 dose 9 tablet 2    venlafaxine 37 5 mg 24 hr tablet Take 1 tab a day for 7 days the 2 tabs daily (Patient not taking: Reported on 6/9/2021) 60 tablet 2    verapamil (CALAN) 40 mg tablet Take 1 tablet (40 mg total) by mouth 3 (three) times a day 60 tablet 2     No current facility-administered medications for this visit        Current Outpatient Medications on File Prior to Visit   Medication Sig    atorvastatin (LIPITOR) 10 mg tablet Take 10 mg by mouth daily    Buprenorphine HCl (Belbuca) 300 MCG FILM Apply 300 mcg to cheek 2 (two) times a day    buPROPion (WELLBUTRIN XL) 150 mg 24 hr tablet     Cobalamine Combinations (B-12) 1000-400 MCG SUBL Inject into a muscle every 30 (thirty) days      Cyanocobalamin 1000 MCG/ML KIT Inject 1,000 mcg into a muscle every 30 (thirty) days    methocarbamol (ROBAXIN) 750 mg tablet Take 1 tablet (750 mg total) by mouth every 8 (eight) hours    metoprolol succinate (TOPROL-XL) 25 mg 24 hr tablet Take 25 mg by mouth daily    Otezla 30 MG TABS     venlafaxine (EFFEXOR) 37 5 mg tablet Take 37 5 mg by mouth once    Buprenorphine HCl (Belbuca) 300 MCG FILM Apply 300 mcg to cheek 2 (two) times a day    venlafaxine 37 5 mg 24 hr tablet Take 1 tab a day for 7 days the 2 tabs daily (Patient not taking: Reported on 6/9/2021)    [DISCONTINUED] hydroxychloroquine (PLAQUENIL) 200 mg tablet Take 200 mg by mouth 2 (two) times a day with meals      [DISCONTINUED] leflunomide (ARAVA) 20 MG tablet 20 mg daily       No current facility-administered medications on file prior to visit  She is allergic to morphine; other; and nitrofurantoin            Objective:    Blood pressure 110/58, pulse 75, height 5' 7" (1 702 m), weight 97 7 kg (215 lb 6 4 oz)  Physical Exam    Neurological Exam  CONSTITUTIONAL: NAD, pleasant  NECK: supple, no lymphadenopathy, no thyromegaly, no JVD  CARDIOVASCULAR: RRR, normal S1S2, no murmurs, no rubs  RESP: clear to auscultation bilaterally, no wheezes/rhonchi/rales  ABDOMEN: soft, non tender, non distended  SKIN: no rash or skin lesions  EXTREMITIES: no edema, pulses 2+bilaterally  PSYCH: appropriate mood and affect  NEUROLOGIC COMPREHENSIVE EXAM: Patient is oriented to person, place and time, NAD; appropriate affect  CN II, III, IV, V, VI, VII,VIII,IX,X,XI-XII intact with EOMI, PERRLA, OKN intact, VF grossly intact, fundi poorly visualized secondary to pupillary constriction; symmetric face noted   Motor: 5/5 UE/LE bilateral symmetric; Sensory: intact to light touch and pinprick bilaterally; normal vibration sensation feet bilaterally; Coordination within normal limits on FTN and MARSHA testing; DTR: 2/4 through, no Babinski, no clonus  Tandem gait is intact  Romberg: negative  ROS:  12 points of review of system was reviewed with the patient and was unremarkable with exception: see HPI  Review of Systems   Constitutional: Negative  Negative for appetite change and fever  HENT: Negative  Negative for hearing loss, tinnitus, trouble swallowing and voice change  Eyes: Negative  Negative for photophobia and pain  Respiratory: Negative  Negative for shortness of breath  Cardiovascular: Negative  Negative for palpitations  Gastrointestinal: Negative  Negative for nausea and vomiting  Endocrine: Negative  Negative for cold intolerance  Genitourinary: Negative  Negative for dysuria, frequency and urgency  Musculoskeletal: Negative  Negative for myalgias and neck pain  Skin: Negative  Negative for rash  Neurological: Positive for dizziness and headaches (Daily)  Negative for tremors, seizures, syncope, facial asymmetry, speech difficulty, weakness, light-headedness and numbness  Pain on left side of the head   Hematological: Negative  Does not bruise/bleed easily  Psychiatric/Behavioral: Negative  Negative for confusion, hallucinations and sleep disturbance

## 2021-06-28 ENCOUNTER — HOSPITAL ENCOUNTER (OUTPATIENT)
Dept: RADIOLOGY | Facility: HOSPITAL | Age: 51
Discharge: HOME | End: 2021-06-28
Attending: PSYCHIATRY & NEUROLOGY
Payer: COMMERCIAL

## 2021-06-28 DIAGNOSIS — R42 PERSISTENT POSTURAL-PERCEPTUAL DIZZINESS: ICD-10-CM

## 2021-06-28 DIAGNOSIS — G44.019 EPISODIC CLUSTER HEADACHE, NOT INTRACTABLE: ICD-10-CM

## 2021-06-28 PROCEDURE — 70496 CT ANGIOGRAPHY HEAD: CPT

## 2021-06-28 PROCEDURE — G1004 CDSM NDSC: HCPCS

## 2021-06-28 RX ADMIN — IOHEXOL 100 ML: 350 INJECTION, SOLUTION INTRAVENOUS at 13:53

## 2022-06-08 ENCOUNTER — OFFICE VISIT (OUTPATIENT)
Dept: NEUROLOGY | Facility: CLINIC | Age: 52
End: 2022-06-08
Payer: COMMERCIAL

## 2022-06-08 VITALS
BODY MASS INDEX: 34.21 KG/M2 | TEMPERATURE: 97.5 F | HEIGHT: 67 IN | HEART RATE: 94 BPM | WEIGHT: 218 LBS | DIASTOLIC BLOOD PRESSURE: 58 MMHG | SYSTOLIC BLOOD PRESSURE: 117 MMHG

## 2022-06-08 DIAGNOSIS — R20.2 NUMBNESS AND TINGLING IN LEFT ARM: ICD-10-CM

## 2022-06-08 DIAGNOSIS — R20.0 NUMBNESS AND TINGLING IN LEFT ARM: ICD-10-CM

## 2022-06-08 DIAGNOSIS — M62.838 MUSCLE SPASMS OF BOTH LOWER EXTREMITIES: ICD-10-CM

## 2022-06-08 DIAGNOSIS — G82.20 PARAPARESIS OF BOTH LOWER LIMBS (HCC): ICD-10-CM

## 2022-06-08 DIAGNOSIS — R20.0 NUMBNESS IN BOTH LEGS: ICD-10-CM

## 2022-06-08 DIAGNOSIS — R42 DIZZINESS AND GIDDINESS: Primary | ICD-10-CM

## 2022-06-08 DIAGNOSIS — G44.019 EPISODIC CLUSTER HEADACHE, NOT INTRACTABLE: ICD-10-CM

## 2022-06-08 DIAGNOSIS — R29.898 WEAKNESS OF BOTH LOWER EXTREMITIES: ICD-10-CM

## 2022-06-08 DIAGNOSIS — M54.12 CERVICAL RADICULOPATHY AT C7: ICD-10-CM

## 2022-06-08 DIAGNOSIS — G37.9 CNS DEMYELINATION (HCC): ICD-10-CM

## 2022-06-08 PROCEDURE — 99215 OFFICE O/P EST HI 40 MIN: CPT | Performed by: PSYCHIATRY & NEUROLOGY

## 2022-06-08 RX ORDER — ERGOCALCIFEROL 1.25 MG/1
50000 CAPSULE ORAL WEEKLY
Qty: 12 CAPSULE | Refills: 0 | Status: SHIPPED | OUTPATIENT
Start: 2022-06-08

## 2022-06-08 RX ORDER — METHOCARBAMOL 750 MG/1
750 TABLET, FILM COATED ORAL EVERY 8 HOURS SCHEDULED
Qty: 90 TABLET | Refills: 1 | Status: SHIPPED | OUTPATIENT
Start: 2022-06-08 | End: 2022-07-28

## 2022-06-08 RX ORDER — LORAZEPAM 1 MG/1
TABLET ORAL
Qty: 2 TABLET | Refills: 0 | Status: SHIPPED | OUTPATIENT
Start: 2022-06-08 | End: 2022-06-08 | Stop reason: SDUPTHER

## 2022-06-08 RX ORDER — LORAZEPAM 1 MG/1
TABLET ORAL
Qty: 2 TABLET | Refills: 0 | Status: SHIPPED | OUTPATIENT
Start: 2022-06-08

## 2022-06-08 RX ORDER — NALOXONE HYDROCHLORIDE 4 MG/.1ML
SPRAY NASAL
Qty: 1 EACH | Refills: 1 | Status: SHIPPED | OUTPATIENT
Start: 2022-06-08

## 2022-06-08 RX ORDER — VERAPAMIL HYDROCHLORIDE 40 MG/1
40 TABLET ORAL 3 TIMES DAILY
Qty: 60 TABLET | Refills: 2 | Status: SHIPPED | OUTPATIENT
Start: 2022-06-08

## 2022-06-08 NOTE — PROGRESS NOTES
[facility]IPLE [facility]CLER[facility]  PATIENT:  Trista Sierra  MRN:  [id number]  :  1970  DATE OF SERVICE:  2022    Assessment/Plan:         Problem List Items Addressed This Visit        Nervous and Auditory    Weakness of both lower extremities    Relevant Medications    methocarbamol (ROBAXIN) 750 mg tablet    Paraparesis of both lower limbs (HCC)    Relevant Medications    ergocalciferol (VITAMIN D2) 50,000 units    Cervical radiculopathy at C7    CNS demyelination (HCC)    Relevant Medications    naloxone (NARCAN) 4 mg/0 1 mL nasal spray    LORazepam (ATIVAN) 1 mg tablet    Other Relevant Orders    MRI brain MS wo and w contrast    MRI cervical spine with and without contrast    EMG 1 Limb    BUN    Creatinine, serum    Vitamin B12    Vitamin D 25 hydroxy       Other    Numbness in both legs    Muscle spasms of both lower extremities    Relevant Medications    methocarbamol (ROBAXIN) 750 mg tablet    Numbness and tingling in left arm    Dizziness and giddiness - Primary    Relevant Orders    MRI brain MS wo and w contrast    MRI cervical spine with and without contrast    EMG 1 Limb      Other Visit Diagnoses     Episodic cluster headache, not intractable        Relevant Medications    ergocalciferol (VITAMIN D2) 50,000 units    methocarbamol (ROBAXIN) 750 mg tablet    verapamil (CALAN) 40 mg tablet         Mrs Sana Alford has presented to [facility] multiple [address] for follow-up on intermittent dizziness and headaches  Patient was diagnosed with CNS demyelination with last imaging studies completed in 2019 with no demyelination noted in cervical and upper thoracic region at that time  Since last office visit 12 months ago, patient has progression of psoriasis with biopsy was done of her visit clear rash in her hands with known psoriatic arthritis requiring adjustment of immunosuppressive regimen from Plaquenil to [facility] to methotrexate and Humira, as per the patient     Patient will be advised to repeat MRI of the brain and cervical spine  Patient has known history of ACDF C4-C6 with C7 radiculopathy left side, patient has shoulder replacement on left side with persistent sensory dysfunction described at C5-C6 radicular nerve distribution  Patient will be advised to consider additional evaluation with EMG study as a follow-up study  Patient is to continue vitamin-D supplements in addition to B12 after serologic workup completed and abnormal     Intermittent headaches has been described, non concerning  Patient described no new focal weakness  No signs of vertigo  Positional dizziness has been noted  Patient is to follow up [facility] within 6 minutes    Subjective: dizziness, speech difficulty, light headedness, numbness in both hands primarily on left hand and headaches  HPI  Mrs Mary Tesfaye has presented to  [address] [place] [address] for follow up on dizziness and headaches;  Since last office visit, patient described having intermittent bouts of spinning sensation brought by changing position and while she is driving  Events last couple seconds  No nausea, no vertigo, no headache has been described  Patient completed CT angiogram had in June 2021, unremarkable  Patient had brain MRI completed in June 2019 with no imaging has been done since that time to follow-up on CNS demyelination  Patient has known history of cirrhotic arthritis and pernicious anemia  Patient has multiple regions of visit clear rash in her hands and her feet/ankles with biopsy-proven psoriasis diagnosis established  Patient requires adjustment of her regimen by adding methotrexate and likely transition to Humira  Patient has been experiencing persistent left upper extremity pain which radiates from upper neck for the shoulders on the latter posterior part of her arm to the last 3 fingers in her left hand    Patient was last evaluated by pain management team several months ago she has regular follow-up and Belbuca regimen provided  - patient has known peripheral polyneuropathy and lower extremity weakness has been previously described, believed to be multifactorial including lumbar radiculopathy and pernicious anemia related neurological dysfunction;  - patient has persistent perceptual dizziness, she feels much better today patient had excellent recover after coronavirus infection;  Patient was offered short trial of verapamil 40 mg twice daily as a preventive measures and sumatriptan 50 mg oral tablet, we agreed patient may require nasal form of triptan to achieve past relieve of her cluster headache, zomig 5 mg NS will be offered as a next abortive treatment;  - patient was recently diagnosed with psoriasis and psoriatic arthritis patient has been off Plaquenil and leflunomide and she recently started [employer]; vitamin D supplements was highly recommended;   - patient previously described persistent dizziness and now unilateral severe pain, we agreed patient will benefit from CT angiogram head to rule out underlying vascular pathology; The following portions of the patient's history were reviewed and updated as appropriate:   She  has a past medical history of Atrophy of vagina, Avascular necrosis ([facility] [address] ), Depression, Easy bruisability, Endometriosis, Factor V Leiden mutation ([facility] ), migraines, Lichen sclerosus, Ovarian cyst, Pelvic pain, Periodic limb movement disorder, Peripheral neuropathy, and Psoriasis (2021)    She   Patient Active Problem List    Diagnosis Date Noted    Cervical radiculopathy at C7 06/08/2022    CNS demyelination ([facility] ) 06/08/2022    Paraparesis of both lower limbs ([facility] ) 06/09/2021    Persistent postural-perceptual dizziness 12/16/2020    Real time reverse transcriptase PCR positive for COVID-19 virus 08/26/2020    Dizziness and giddiness 08/26/2020    Numbness and tingling in left arm 08/28/2019    Chronic neck pain 06/19/2019    Muscle spasms of both lower extremities 2019    Pernicious anemia 2019    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 2019    Other abnormal findings on diagnostic imaging of central nervous system 10/24/2018    Weakness of both lower extremities 10/24/2018    Dysphagia 10/24/2018    Numbness in both legs 10/24/2018    Other spondylosis with radiculopathy, lumbar region 10/24/2018     She  has a past surgical history that includes Hysterectomy; Appendectomy;  section; Laparoscopic ovarian cystectomy; and [place] lumbar puncture diagnostic (2018)  Her family history is not on file  She  reports that she has been smoking  She has a 30 00 pack-year smoking history  She has never used smokeless tobacco  She reports that she does not drink alcohol and does not use drugs  Current Outpatient Medications   Medication Sig Dispense Refill    atorvastatin (LIPITOR) 10 mg tablet Take 10 mg by mouth daily      ergocalciferol (VITAMIN D2) 50,000 units Take 1 capsule (50,000 Units total) by mouth once a week 12 capsule 0    LORazepam (ATIVAN) 1 mg tablet Take 1 tab 40 min prior to MRI with a second tab 10 min prior to imaging 2 tablet 0    methocarbamol (ROBAXIN) 750 mg tablet Take 1 tablet (750 mg total) by mouth every 8 (eight) hours 90 tablet 1    naloxone (NARCAN) 4 mg/0 1 mL nasal spray Administer 1 spray into a nostril  If no response after 2-3 minutes, give another dose in the other nostril using a new spray   1 each 1    Otezla 30 MG TABS 2 (two) times a day      SUMAtriptan (IMITREX) 50 mg tablet Take 1 tablet (50 mg total) by mouth once as needed for migraine for up to 1 dose 9 tablet 2    verapamil (CALAN) 40 mg tablet Take 1 tablet (40 mg total) by mouth 3 (three) times a day 60 tablet 2    Buprenorphine HCl (Belbuca) 300 MCG FILM Apply 300 mcg to cheek 2 (two) times a day      Buprenorphine HCl (Belbuca) 300 MCG FILM Apply 300 mcg to cheek 2 (two) times a day      buPROPion (WELLBUTRIN XL) 150 mg 24 hr tablet       Cobalamine Combinations (B-12) 1000-400 MCG SUBL Inject into a muscle every 30 (thirty) days   (Patient not taking: Reported on 6/8/2022)      Cyanocobalamin 1000 MCG/ML KIT Inject 1,000 mcg into a muscle every 30 (thirty) days (Patient not taking: Reported on 6/8/2022)      metoprolol succinate (TOPROL-XL) 25 mg 24 hr tablet Take 25 mg by mouth daily (Patient not taking: Reported on 6/8/2022)  3    venlafaxine (EFFEXOR) 37 5 mg tablet Take 37 5 mg by mouth once      venlafaxine 37 5 mg 24 hr tablet Take 1 tab a day for 7 days the 2 tabs daily (Patient not taking: Reported on 6/9/2021) 60 tablet 2     No current facility-administered medications for this visit       Current Outpatient Medications on File Prior to Visit   Medication Sig    atorvastatin (LIPITOR) 10 mg tablet Take 10 mg by mouth daily    Otezla 30 MG TABS 2 (two) times a day    SUMAtriptan (IMITREX) 50 mg tablet Take 1 tablet (50 mg total) by mouth once as needed for migraine for up to 1 dose    [DISCONTINUED] methocarbamol (ROBAXIN) 750 mg tablet Take 1 tablet (750 mg total) by mouth every 8 (eight) hours    [DISCONTINUED] verapamil (CALAN) 40 mg tablet Take 1 tablet (40 mg total) by mouth 3 (three) times a day    Buprenorphine HCl (Belbuca) 300 MCG FILM Apply 300 mcg to cheek 2 (two) times a day    Buprenorphine HCl (Belbuca) 300 MCG FILM Apply 300 mcg to cheek 2 (two) times a day    buPROPion (WELLBUTRIN XL) 150 mg 24 hr tablet     Cobalamine Combinations (B-12) 1000-400 MCG SUBL Inject into a muscle every 30 (thirty) days   (Patient not taking: Reported on 6/8/2022)    Cyanocobalamin 1000 MCG/ML KIT Inject 1,000 mcg into a muscle every 30 (thirty) days (Patient not taking: Reported on 6/8/2022)    metoprolol succinate (TOPROL-XL) 25 mg 24 hr tablet Take 25 mg by mouth daily (Patient not taking: Reported on 6/8/2022)    venlafaxine (EFFEXOR) 37 5 mg tablet Take 37 5 mg by mouth once    venlafaxine 37 5 mg 24 hr tablet Take 1 tab a day for 7 days the 2 tabs daily (Patient not taking: Reported on 6/9/2021)    [DISCONTINUED] ergocalciferol (VITAMIN D2) 50,000 units Take 1 capsule (50,000 Units total) by mouth once a week (Patient not taking: Reported on 6/8/2022)     No current facility-administered medications on file prior to visit  She is allergic to morphine, other, and nitrofurantoin            Objective:    Blood pressure 117/58, pulse 94, temperature 97 5 °F (36 4 °C), temperature source Skin, height 5' 7" (1 702 m), weight 98 9 kg (218 lb)  Physical Exam    Neurological Exam  CONSTITUTIONAL: NAD, pleasant  NECK: supple, no lymphadenopathy, no thyromegaly, no JVD  CARDIOVASCULAR: RRR, normal S1S2, no murmurs, no rubs  RESP: clear to auscultation bilaterally, no wheezes/rhonchi/rales  ABDOMEN: soft, non tender, non distended  SKIN: no rash or skin lesions  EXTREMITIES: no edema, pulses 2+bilaterally  PSYCH: appropriate mood and affect  NEUROLOGIC COMPREHENSIVE EXAM: Patient is oriented to person, place and time, NAD; appropriate affect  CN II, III, IV, V, VI, VII,VIII,IX,X,XI-XII intact with EOMI, PERRLA, OKN intact, VF grossly intact, fundi poorly visualized secondary to pupillary constriction; symmetric face noted  Motor: 5/5 UE/LE bilateral symmetric; Sensory: intact to light touch and pinprick bilaterally; normal vibration sensation feet bilaterally; Coordination within normal limits on FTN and MARSHA testing; DTR: 2/4 through, no Babinski, no clonus  Tandem gait is intact  Romberg: absent  ROS:  12 points of review of system was reviewed with the patient and was unremarkable with exception: see HPI  Review of Systems   Constitutional: Negative  Negative for appetite change and fever  HENT: Negative  Negative for hearing loss, tinnitus, trouble swallowing and voice change  Eyes: Negative  Negative for photophobia and pain  Respiratory: Negative  Negative for shortness of breath  Cardiovascular: Negative  Negative for palpitations  Gastrointestinal: Negative  Negative for nausea and vomiting  Endocrine: Negative  Negative for cold intolerance  Genitourinary: Negative  Negative for dysuria, frequency and urgency  Musculoskeletal: Negative  Negative for myalgias and neck pain  Skin: Negative  Negative for rash  Neurological: Positive for dizziness, speech difficulty, light-headedness, numbness (both hands primarly left hand) and headaches  Negative for tremors, seizures, syncope, facial asymmetry and weakness  Hematological: Negative  Does not bruise/bleed easily  Psychiatric/Behavioral: Negative  Negative for confusion, hallucinations and sleep disturbance

## 2022-07-07 LAB — HBA1C MFR BLD HPLC: 5.8 %

## 2022-07-13 ENCOUNTER — HOSPITAL ENCOUNTER (OUTPATIENT)
Dept: RADIOLOGY | Facility: HOSPITAL | Age: 52
Discharge: HOME | End: 2022-07-13
Attending: PSYCHIATRY & NEUROLOGY
Payer: COMMERCIAL

## 2022-07-13 DIAGNOSIS — R42 DIZZINESS AND GIDDINESS: ICD-10-CM

## 2022-07-13 DIAGNOSIS — G37.9 CNS DEMYELINATION (HCC): ICD-10-CM

## 2022-07-13 PROCEDURE — A9585 GADOBUTROL INJECTION: HCPCS | Performed by: PSYCHIATRY & NEUROLOGY

## 2022-07-13 PROCEDURE — 72156 MRI NECK SPINE W/O & W/DYE: CPT

## 2022-07-13 PROCEDURE — 70553 MRI BRAIN STEM W/O & W/DYE: CPT

## 2022-07-13 PROCEDURE — G1004 CDSM NDSC: HCPCS

## 2022-07-13 RX ADMIN — GADOBUTROL 9 ML: 604.72 INJECTION INTRAVENOUS at 19:13

## 2022-07-15 ENCOUNTER — TELEPHONE (OUTPATIENT)
Dept: NEUROLOGY | Facility: CLINIC | Age: 52
End: 2022-07-15

## 2022-07-15 NOTE — TELEPHONE ENCOUNTER
Patient of Dr Marvin Avendaño for MRI results of brain and C-Spine; please review and provide input, thanks for your help      Best cb [phone] (okay to leave detailed message)

## 2022-07-18 NOTE — TELEPHONE ENCOUNTER
Imaging suggested no progression of white matter disease in the brain, stable age related changes; patient is to follow with primary team for mastoid infection/inflammation   Spinal MRI also suggested no intrinsic cord pathology; postsurgical changes are noted and no additional pathology appreciated

## 2022-07-26 NOTE — TELEPHONE ENCOUNTER
Josh Freitas  to Huong Rogers MD          7/20/22 10:03 AM  My MRI showed extensive mastoid opacification  Everything Im reading suggests it could be an infection  Do I need to stop my autoimmune suppressants?

## 2022-07-26 NOTE — TELEPHONE ENCOUNTER
Left detailed message for pt making her aware of all of the below  Advised her that she should contact the provider that is ordering her immunosuppressants to determine if any changes need to be made  Pt to call back with any questions

## 2022-07-28 DIAGNOSIS — M62.838 MUSCLE SPASMS OF BOTH LOWER EXTREMITIES: ICD-10-CM

## 2022-07-28 DIAGNOSIS — R29.898 WEAKNESS OF BOTH LOWER EXTREMITIES: ICD-10-CM

## 2022-07-28 RX ORDER — METHOCARBAMOL 750 MG/1
750 TABLET, FILM COATED ORAL EVERY 8 HOURS SCHEDULED
Qty: 90 TABLET | Refills: 1 | Status: SHIPPED | OUTPATIENT
Start: 2022-07-28

## 2022-08-25 DIAGNOSIS — G44.019 EPISODIC CLUSTER HEADACHE, NOT INTRACTABLE: ICD-10-CM

## 2022-08-25 DIAGNOSIS — G82.20 PARAPARESIS OF BOTH LOWER LIMBS (HCC): ICD-10-CM

## 2022-08-25 RX ORDER — ERGOCALCIFEROL 1.25 MG/1
50000 CAPSULE ORAL WEEKLY
Qty: 12 CAPSULE | Refills: 0 | Status: SHIPPED | OUTPATIENT
Start: 2022-08-25

## 2022-11-14 DIAGNOSIS — G44.019 EPISODIC CLUSTER HEADACHE, NOT INTRACTABLE: ICD-10-CM

## 2022-11-14 DIAGNOSIS — G82.20 PARAPARESIS OF BOTH LOWER LIMBS (HCC): ICD-10-CM

## 2022-11-17 RX ORDER — ERGOCALCIFEROL 1.25 MG/1
50000 CAPSULE ORAL WEEKLY
Qty: 12 CAPSULE | Refills: 0 | Status: SHIPPED | OUTPATIENT
Start: 2022-11-17

## 2022-12-07 ENCOUNTER — OFFICE VISIT (OUTPATIENT)
Dept: NEUROLOGY | Facility: CLINIC | Age: 52
End: 2022-12-07

## 2022-12-07 ENCOUNTER — TELEPHONE (OUTPATIENT)
Dept: NEUROLOGY | Facility: CLINIC | Age: 52
End: 2022-12-07

## 2022-12-07 VITALS
DIASTOLIC BLOOD PRESSURE: 58 MMHG | HEIGHT: 67 IN | BODY MASS INDEX: 36.41 KG/M2 | SYSTOLIC BLOOD PRESSURE: 123 MMHG | TEMPERATURE: 98 F | OXYGEN SATURATION: 97 % | WEIGHT: 232 LBS | HEART RATE: 96 BPM

## 2022-12-07 DIAGNOSIS — M54.12 CERVICAL RADICULOPATHY AT C7: ICD-10-CM

## 2022-12-07 DIAGNOSIS — G82.20 PARAPARESIS OF BOTH LOWER LIMBS (HCC): ICD-10-CM

## 2022-12-07 DIAGNOSIS — R42 PERSISTENT POSTURAL-PERCEPTUAL DIZZINESS: ICD-10-CM

## 2022-12-07 DIAGNOSIS — R29.898 WEAKNESS OF BOTH LOWER EXTREMITIES: Primary | ICD-10-CM

## 2022-12-07 NOTE — PROGRESS NOTES
Review of Systems   Constitutional: Negative  Negative for appetite change and fever  HENT: Negative  Negative for hearing loss, tinnitus, trouble swallowing and voice change  Eyes: Negative  Negative for photophobia, pain and visual disturbance  Respiratory: Negative  Negative for shortness of breath  Cardiovascular: Negative  Negative for palpitations  Gastrointestinal: Negative  Negative for nausea and vomiting  Endocrine: Negative  Negative for cold intolerance  Genitourinary: Negative  Negative for dysuria, frequency and urgency  Musculoskeletal: Negative  Negative for gait problem, myalgias and neck pain  6-8/10 pain everywhere   Skin: Negative  Negative for rash  Allergic/Immunologic: Negative  Neurological: Positive for dizziness, tremors and numbness (left arm )  Negative for seizures, syncope, facial asymmetry, speech difficulty, weakness, light-headedness and headaches  Hematological: Negative  Does not bruise/bleed easily  Psychiatric/Behavioral: Negative  Negative for confusion, hallucinations and sleep disturbance  All other systems reviewed and are negative

## 2022-12-07 NOTE — PROGRESS NOTES
Patient ID: Unique Rizvi is a 47 y o  female  Assessment/Plan:           Problem List Items Addressed This Visit        Nervous and Auditory    Weakness of both lower extremities - Primary    Paraparesis of both lower limbs (HCC)    Cervical radiculopathy at C7       Other    Persistent postural-perceptual dizziness      Mrs Rodriguez Alex [facility] on multiple neurological complaints  Patient had completed imaging of the brain and cervical spine with no signs of acute or chronic ischemic or hemorrhagic changes, no signs of sinister lesion has been appreciated in brain and in her cervical/upper thoracic regions  Patient has stable postsurgical changes s/p ACDF C4-C6 with adequate decompression central canal has been noted  Patient has persistent dizziness as we recognized mild mastoid effusion noted on her recent brain MRI, patient followed with ENT team where she was found to have worsening hearing as well as a nasopharyngeal mass  Patient is working with oncology team to establish her diagnosis and prognosis  Patient has challenges with her family stress, patient has been off disease modifying regimen for psoriasis with significant flareup appreciated on her exam today including her hands lower extremities and her feet with swelling in her knee and ankles noted concerning for psoriatic arthritis at this point  Patient has preserved strength in upper lower extremity with the right lower extremity weakness and pain has been persistent which interferes with ambulatory function  Patient is to follow-up with ENT/oncology/rheumatology on scheduled basis  Patient is to follow through with neurology on an annual basis, patient is to reach me back if any new focal neurological deficit described  Subjective: Dizziness    HPI  Mrs Rodriguez Alex has presented to [facility] multiple [address] for follow-up on intermittent dizziness and headaches    Patient was diagnosed with CNS demyelination with last imaging studies completed in 2019 with no demyelination noted in cervical and upper thoracic region at that time  Patient has diagnosed psoriatic arthritis , patient has been off disease modifying regimen for her psoriasis for multiple reasons  Patient stated she has swelling in her Achilles tendons on the right leg as well as knee  Patient has ambulatory dysfunction as a result suggestive of right leg weakness and pain  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, imaging were done with CAT scan was showing positive soft tissue outgrowth  Patient is to follow with oncology team for 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 noted on her recent MRI  Patient may require evaluation and treatment in her inner ear pathology which may potentially help with dizziness  [facility] completed in July 2022 and patient is here today to discuss her findings  Patient will be advised to repeat MRI of the brain and cervical spine  Patient has known history of ACDF C4-C6 with C7 radiculopathy left side, patient has shoulder replacement on left side with persistent sensory dysfunction described at C5-C6 radicular nerve distribution  Patient will be advised to consider additional evaluation with EMG study as a follow-up study       Patient is to continue vitamin-D supplements in addition to B12 after serologic workup completed and abnormal      Intermittent headaches has been described, non concerning      Patient described no new focal weakness  No signs of vertigo  Positional dizziness has been noted        The following portions of the patient's history were reviewed and updated as appropriate:   She  has a past medical history of Atrophy of vagina, Avascular necrosis ([address] ), Depression, Easy bruisability, Endometriosis, Factor V Leiden mutation ([address] ), migraines, Lichen sclerosus, Ovarian cyst, Pelvic pain, Periodic limb movement disorder, Peripheral neuropathy, and Psoriasis ()  She   Patient Active Problem List    Diagnosis Date Noted   • Cervical radiculopathy at C7 2022   • CNS demyelination ([facility] ) 2022   • Paraparesis of both lower limbs ([facility] ) 2021   • Persistent postural-perceptual dizziness 2020   • Real time reverse transcriptase PCR positive for COVID-19 virus 2020   • Dizziness and giddiness 2020   • Numbness and tingling in left arm 2019   • Chronic neck pain 2019   • Muscle spasms of both lower extremities 2019   • Pernicious anemia 2019   • 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 2019   • Other abnormal findings on diagnostic imaging of central nervous system 10/24/2018   • Weakness of both lower extremities 10/24/2018   • Dysphagia 10/24/2018   • Numbness in both legs 10/24/2018   • Other spondylosis with radiculopathy, lumbar region 10/24/2018     She  has a past surgical history that includes Hysterectomy; Appendectomy;  section; Laparoscopic ovarian cystectomy; and [place] lumbar puncture diagnostic (2018)  Her family history includes Atrial fibrillation in her mother; Diabetes in her mother; Hypertension in her mother; No Known Problems in her father  She  reports that she has been smoking cigarettes  She has a 30 00 pack-year smoking history  She has never used smokeless tobacco  She reports that she does not drink alcohol and does not use drugs    Current Outpatient Medications   Medication Sig Dispense Refill   • atorvastatin (LIPITOR) 10 mg tablet Take 10 mg by mouth daily     • Buprenorphine HCl (Belbuca) 300 MCG FILM Apply 150 mcg to cheek 2 (two) times a day     • ergocalciferol (VITAMIN D2) 50,000 units TAKE 1 CAPSULE (50,000 UNITS TOTAL) BY MOUTH ONCE A WEEK 12 capsule 0   • methocarbamol (ROBAXIN) 750 mg tablet TAKE 1 TABLET (750 MG TOTAL) BY MOUTH EVERY 8 (EIGHT) HOURS 90 tablet 1   • SUMAtriptan (IMITREX) 50 mg tablet Take 1 tablet (50 mg total) by mouth once as needed for migraine for up to 1 dose 9 tablet 2   • verapamil (CALAN) 40 mg tablet Take 1 tablet (40 mg total) by mouth 3 (three) times a day 60 tablet 2   • Buprenorphine HCl (Belbuca) 300 MCG FILM Apply 300 mcg to cheek 2 (two) times a day     • buPROPion (WELLBUTRIN XL) 150 mg 24 hr tablet      • Cobalamine Combinations (B-12) 1000-400 MCG SUBL Inject into a muscle every 30 (thirty) days   (Patient not taking: Reported on 6/8/2022)     • Cyanocobalamin 1000 MCG/ML KIT Inject 1,000 mcg into a muscle every 30 (thirty) days (Patient not taking: Reported on 6/8/2022)     • LORazepam (ATIVAN) 1 mg tablet Take 1 tab 40 min prior to MRI with a second tab 10 min prior to imaging 2 tablet 0   • metoprolol succinate (TOPROL-XL) 25 mg 24 hr tablet Take 25 mg by mouth daily (Patient not taking: Reported on 6/8/2022)  3   • naloxone (NARCAN) 4 mg/0 1 mL nasal spray Administer 1 spray into a nostril  If no response after 2-3 minutes, give another dose in the other nostril using a new spray  1 each 1   • Otezla 30 MG TABS 2 (two) times a day (Patient not taking: Reported on 12/7/2022)     • venlafaxine (EFFEXOR) 37 5 mg tablet Take 37 5 mg by mouth once     • venlafaxine 37 5 mg 24 hr tablet Take 1 tab a day for 7 days the 2 tabs daily (Patient not taking: Reported on 6/9/2021) 60 tablet 2     No current facility-administered medications for this visit       Current Outpatient Medications on File Prior to Visit   Medication Sig   • atorvastatin (LIPITOR) 10 mg tablet Take 10 mg by mouth daily   • Buprenorphine HCl (Belbuca) 300 MCG FILM Apply 150 mcg to cheek 2 (two) times a day   • ergocalciferol (VITAMIN D2) 50,000 units TAKE 1 CAPSULE (50,000 UNITS TOTAL) BY MOUTH ONCE A WEEK   • methocarbamol (ROBAXIN) 750 mg tablet TAKE 1 TABLET (750 MG TOTAL) BY MOUTH EVERY 8 (EIGHT) HOURS   • SUMAtriptan (IMITREX) 50 mg tablet Take 1 tablet (50 mg total) by mouth once as needed for migraine for up to 1 dose   • verapamil (CALAN) 40 mg tablet Take 1 tablet (40 mg total) by mouth 3 (three) times a day   • Buprenorphine HCl (Belbuca) 300 MCG FILM Apply 300 mcg to cheek 2 (two) times a day   • buPROPion (WELLBUTRIN XL) 150 mg 24 hr tablet    • Cobalamine Combinations (B-12) 1000-400 MCG SUBL Inject into a muscle every 30 (thirty) days   (Patient not taking: Reported on 6/8/2022)   • Cyanocobalamin 1000 MCG/ML KIT Inject 1,000 mcg into a muscle every 30 (thirty) days (Patient not taking: Reported on 6/8/2022)   • LORazepam (ATIVAN) 1 mg tablet Take 1 tab 40 min prior to MRI with a second tab 10 min prior to imaging   • metoprolol succinate (TOPROL-XL) 25 mg 24 hr tablet Take 25 mg by mouth daily (Patient not taking: Reported on 6/8/2022)   • naloxone (NARCAN) 4 mg/0 1 mL nasal spray Administer 1 spray into a nostril  If no response after 2-3 minutes, give another dose in the other nostril using a new spray  • Otezla 30 MG TABS 2 (two) times a day (Patient not taking: Reported on 12/7/2022)   • venlafaxine (EFFEXOR) 37 5 mg tablet Take 37 5 mg by mouth once   • venlafaxine 37 5 mg 24 hr tablet Take 1 tab a day for 7 days the 2 tabs daily (Patient not taking: Reported on 6/9/2021)     No current facility-administered medications on file prior to visit  She is allergic to morphine, other, and nitrofurantoin            Objective:    Blood pressure 123/58, pulse 96, temperature 98 °F (36 7 °C), temperature source Temporal, height 5' 7" (1 702 m), weight 105 kg (232 lb), SpO2 97 %  Physical Exam    Neurological Exam  CONSTITUTIONAL: NAD, pleasant  NECK: supple, no lymphadenopathy, no thyromegaly, no JVD  CARDIOVASCULAR: RRR, normal S1S2, no murmurs, no rubs  RESP: clear to auscultation bilaterally, no wheezes/rhonchi/rales  ABDOMEN: soft, non tender, non distended  SKIN: no rash or skin lesions  EXTREMITIES: no edema, pulses 2+bilaterally   PSYCH: appropriate mood and affect  NEUROLOGIC COMPREHENSIVE EXAM: Patient is oriented to person, place and time, NAD; appropriate affect  CN II, III, IV, V, VI, VII,VIII,IX,X,XI-XII intact with EOMI, PERRLA, OKN intact, VF grossly intact, fundi poorly visualized secondary to pupillary constriction; symmetric face noted  Motor: 5/5 UE/LE bilateral symmetric, RLE 4/5 hip flexion; Sensory: intact to light touch and pinprick bilaterally; normal vibration sensation feet bilaterally; Coordination within normal limits on FTN and MARSHA testing; DTR: 2/4 through, no Babinski, no clonus  Tandem gait is intact  Romberg: absent  ROS:  12 points of review of system was reviewed with the patient and was unremarkable with exception: see HPI  Review of Systems    Constitutional: Negative  Negative for appetite change and fever  HENT: Negative  Negative for hearing loss, tinnitus, trouble swallowing and voice change  Eyes: Negative  Negative for photophobia, pain and visual disturbance  Respiratory: Negative  Negative for shortness of breath  Cardiovascular: Negative  Negative for palpitations  Gastrointestinal: Negative  Negative for nausea and vomiting  Endocrine: Negative  Negative for cold intolerance  Genitourinary: Negative  Negative for dysuria, frequency and urgency  Musculoskeletal: Negative  Negative for gait problem, myalgias and neck pain  6-8/10 pain everywhere   Skin: Negative  Negative for rash  Allergic/Immunologic: Negative  Neurological: Positive for dizziness, tremors and numbness (left arm )  Negative for seizures, syncope, facial asymmetry, speech difficulty, weakness, light-headedness and headaches  Hematological: Negative  Does not bruise/bleed easily  Psychiatric/Behavioral: Negative  Negative for confusion, hallucinations and sleep disturbance  All other systems reviewed and are negative

## 2022-12-08 NOTE — TELEPHONE ENCOUNTER
MSW phoned pt and lvm requesting a call back regarding question about MA  MSW verified patient's MA eligibility and she is active  If pt has resources in her possesion pt will have to contact her  at [facility] [place] office at   [address]   [phone]

## 2022-12-08 NOTE — TELEPHONE ENCOUNTER
Please reach the patient with questions about Medicaid and her financial part she has in her posesion, patient has multiple medical/rheumatological conditions

## 2022-12-09 NOTE — TELEPHONE ENCOUNTER
MSW phoned pt who informed concerns about not receiving response from [employer] regarding appealing documentation that she submitted for MA  Additionally pt receives SSI and would like to know if she can receive SSD instead as Child Support will be ending for her children in June  MSW recommended pt to contact the [address] [facility] service number in order to speak with someone on the line and have answers to her questions at [phone]  Additionally, this writer provided to pt phone number for SS office to address questions regarding current benefits at   [phone]     MSW asked pt if she needed any follow up but pt informed she will not need this writer to contact her regarding info provided  She is aware to contact MSW for any questions or future social needs

## 2023-01-04 ENCOUNTER — HOSPITAL ENCOUNTER (OUTPATIENT)
Dept: NEUROLOGY | Facility: CLINIC | Age: 53
Discharge: HOME | End: 2023-01-04

## 2023-01-04 DIAGNOSIS — G37.9 CNS DEMYELINATION (HCC): ICD-10-CM

## 2023-01-04 DIAGNOSIS — R42 DIZZINESS AND GIDDINESS: ICD-10-CM

## 2023-02-06 DIAGNOSIS — M62.838 MUSCLE SPASMS OF BOTH LOWER EXTREMITIES: ICD-10-CM

## 2023-02-06 DIAGNOSIS — G82.20 PARAPARESIS OF BOTH LOWER LIMBS (HCC): ICD-10-CM

## 2023-02-06 DIAGNOSIS — G44.019 EPISODIC CLUSTER HEADACHE, NOT INTRACTABLE: ICD-10-CM

## 2023-02-06 DIAGNOSIS — R29.898 WEAKNESS OF BOTH LOWER EXTREMITIES: ICD-10-CM

## 2023-02-06 RX ORDER — ERGOCALCIFEROL 1.25 MG/1
50000 CAPSULE ORAL WEEKLY
Qty: 12 CAPSULE | Refills: 0 | Status: SHIPPED | OUTPATIENT
Start: 2023-02-06

## 2023-02-07 RX ORDER — VERAPAMIL HYDROCHLORIDE 40 MG/1
40 TABLET ORAL 3 TIMES DAILY
Qty: 60 TABLET | Refills: 2 | Status: SHIPPED | OUTPATIENT
Start: 2023-02-07

## 2023-02-07 RX ORDER — METHOCARBAMOL 750 MG/1
750 TABLET, FILM COATED ORAL EVERY 8 HOURS SCHEDULED
Qty: 90 TABLET | Refills: 1 | Status: SHIPPED | OUTPATIENT
Start: 2023-02-07

## 2023-04-03 DIAGNOSIS — M62.838 MUSCLE SPASMS OF BOTH LOWER EXTREMITIES: ICD-10-CM

## 2023-04-03 DIAGNOSIS — R29.898 WEAKNESS OF BOTH LOWER EXTREMITIES: ICD-10-CM

## 2023-04-04 RX ORDER — METHOCARBAMOL 750 MG/1
750 TABLET, FILM COATED ORAL EVERY 8 HOURS SCHEDULED
Qty: 90 TABLET | Refills: 1 | Status: SHIPPED | OUTPATIENT
Start: 2023-04-04

## 2023-09-06 ENCOUNTER — TELEPHONE (OUTPATIENT)
Dept: NEUROLOGY | Facility: CLINIC | Age: 53
End: 2023-09-06

## 2023-09-06 NOTE — TELEPHONE ENCOUNTER
Patient is scheduled with Dr LOPEZ on 12/7. Provider had a schedule change and patient needs to be rescheduled. Rescheduled to 2PM, same day.

## 2023-09-25 ENCOUNTER — TELEPHONE (OUTPATIENT)
Dept: NEUROLOGY | Facility: CLINIC | Age: 53
End: 2023-09-25

## 2023-12-11 ENCOUNTER — OFFICE VISIT (OUTPATIENT)
Dept: NEUROLOGY | Facility: CLINIC | Age: 53
End: 2023-12-11
Payer: COMMERCIAL

## 2023-12-11 VITALS
HEART RATE: 97 BPM | TEMPERATURE: 98.2 F | SYSTOLIC BLOOD PRESSURE: 108 MMHG | DIASTOLIC BLOOD PRESSURE: 68 MMHG | HEIGHT: 67 IN | WEIGHT: 228.6 LBS | BODY MASS INDEX: 35.88 KG/M2 | OXYGEN SATURATION: 98 %

## 2023-12-11 DIAGNOSIS — L40.9 PSORIASIS: ICD-10-CM

## 2023-12-11 DIAGNOSIS — H53.9 TRANSIENT VISION DISTURBANCE OF BOTH EYES: ICD-10-CM

## 2023-12-11 DIAGNOSIS — G82.20 PARAPARESIS OF BOTH LOWER LIMBS (HCC): ICD-10-CM

## 2023-12-11 DIAGNOSIS — D51.0 PERNICIOUS ANEMIA: ICD-10-CM

## 2023-12-11 DIAGNOSIS — M62.838 MUSCLE SPASMS OF BOTH LOWER EXTREMITIES: ICD-10-CM

## 2023-12-11 DIAGNOSIS — R20.0 NUMBNESS AND TINGLING IN LEFT ARM: ICD-10-CM

## 2023-12-11 DIAGNOSIS — R29.6 FREQUENT FALLS: ICD-10-CM

## 2023-12-11 DIAGNOSIS — G93.2 PSEUDOTUMOR CEREBRI: Primary | ICD-10-CM

## 2023-12-11 DIAGNOSIS — R20.0 NUMBNESS IN BOTH LEGS: ICD-10-CM

## 2023-12-11 DIAGNOSIS — M54.12 CERVICAL RADICULOPATHY AT C7: ICD-10-CM

## 2023-12-11 DIAGNOSIS — R20.2 NUMBNESS AND TINGLING IN LEFT ARM: ICD-10-CM

## 2023-12-11 PROCEDURE — 99215 OFFICE O/P EST HI 40 MIN: CPT | Performed by: PSYCHIATRY & NEUROLOGY

## 2023-12-11 RX ORDER — CLOBETASOL PROPIONATE 0.5 MG/G
CREAM TOPICAL 2 TIMES DAILY PRN
COMMUNITY
Start: 2023-09-26 | End: 2024-09-25

## 2023-12-11 RX ORDER — CITALOPRAM 20 MG/1
20 TABLET ORAL DAILY
COMMUNITY

## 2023-12-11 RX ORDER — ADALIMUMAB 40MG/0.4ML
40 KIT SUBCUTANEOUS
COMMUNITY
Start: 2023-09-19

## 2023-12-11 RX ORDER — KETOROLAC TROMETHAMINE 10 MG/1
10 TABLET, FILM COATED ORAL EVERY 6 HOURS PRN
Qty: 10 TABLET | Refills: 5 | Status: SHIPPED | OUTPATIENT
Start: 2023-12-11

## 2023-12-11 RX ORDER — TOPIRAMATE 25 MG/1
TABLET ORAL
Qty: 180 TABLET | Refills: 5 | Status: SHIPPED | OUTPATIENT
Start: 2023-12-11

## 2023-12-11 NOTE — PROGRESS NOTES
Patient ID: Ana Kc is a 47 y.o. female. Assessment/Plan:           Problem List Items Addressed This Visit          Nervous and Auditory    Cervical radiculopathy at C7       Other    Numbness in both legs    Muscle spasms of both lower extremities    Relevant Orders    Ambulatory Referral to Physical Therapy    Pernicious anemia    Relevant Orders    Ambulatory Referral to Ophthalmology    Ambulatory Referral to Weight Management    Numbness and tingling in left arm    Paraparesis of both lower limbs (HCC)    Relevant Medications    topiramate (Topamax) 25 mg tablet    Other Relevant Orders    Ambulatory Referral to Physical Therapy     Other Visit Diagnoses       Pseudotumor cerebri    -  Primary    Relevant Medications    topiramate (Topamax) 25 mg tablet    ketorolac (TORADOL) 10 mg tablet    Other Relevant Orders    Ambulatory Referral to Ophthalmology    Ambulatory Referral to Weight Management    Transient vision disturbance of both eyes        Relevant Medications    topiramate (Topamax) 25 mg tablet    ketorolac (TORADOL) 10 mg tablet    Other Relevant Orders    Ambulatory Referral to Ophthalmology    Ambulatory Referral to Weight Management    Psoriasis        Relevant Medications    Humira Pen 40 MG/0.4ML PNKT    clobetasol (TEMOVATE) 0.05 % cream    ketorolac (TORADOL) 10 mg tablet    Other Relevant Orders    Ambulatory Referral to Weight Management    Frequent falls        Relevant Medications    ketorolac (TORADOL) 10 mg tablet    Other Relevant Orders    Ambulatory Referral to Physical Therapy             Mrs. Jesus Calvin has [facility] follow-up on multiple neurological complaints. Patient has developed several instances of transient visual obscuration with persistent chronic daily headaches and tinnitus. Hospitalization in February brought concern with patient likely developing pseudotumor cerebri in the setting of weight gain.   Patient has been working with dietitian and to be offered patient to follow-up with weight management team as treatment for pseudotumor cerebri is weight loss. Patient will be following with ophthalmology for evaluation of optic nerve integrity. Patient was advised against spinal tap but we will be happy initiating Topamax with titration will be advised to 75 mg twice daily; ketorolac 10 mg was advised for breakthrough headaches. If patient continue describing persistent headache, initiating Diamox and proceeding with spine workup will be advised at the same time patient has lost 12 pounds already from 241 pounds to 229, hoping patient continues same pace. Patient described more balance dysfunction as she believes she has flareup of psoriasis with multiple joint stiffness reported. Patient started Humira 12 months ago with limited response has been advised. Patient is to follow-up with rheumatology team on scheduled basis. Patient continued treatment of B12 deficiency due to pernicious anemia as she has close follow-up with hematology team due to factor V Leiden mutation. If patient has developed intractable headache-patient will be advised ER visit for STAT LP and initiating Diamox 1000 twice daily. No new focal weakness noted. No imaging advised this office visit. Subjective: reports her last fall was in April which resulted in having knee surgery since she fell hard. Patient says her balance is declining. Patient also states she is experiencing stinging sensations on the arms, and also ongoing vibrations in both legs, that follow with weakness. HPI  Mrs. Nadia Willis [facility] on multiple neurological complaints. Patient described having transient visual obscuration which took place in February 2023, event lasted up to 10 hours and repeated itself 3 times already.   Patient states that she had headache all the time and bilateral ear tinnitus; patient stated based on this clinical presentation consideration was for idiopathic intracranial hypertension. Patient has not seek any medical attention since February to her office visit today. Patient stated she has whole head throbbing sensation. Patient feels more depressed recently. Patient also described flushing sensation. Patient has started new medication of Humira for her psoriasis due to progressive nature of her condition. Patient had paresthesia involving left upper extremity with normal EMG described. In 2017 patient had EMG of the lower extremity with no pathology noted at that time. Patient reports having vibration perception in her thighs bilaterally as initially was reported in upper chest and shoulders. Patient considering weight management as she has been working with dietitian and trying to lose weight. Patient describes achiness in her thighs with intermittent muscle twitching. Patient also reported random pulse as she is using cane. MRI brain 7/2022: Stable MRI of the brain with scattered nonspecific white matter foci particularly in deep white matter and subcortical white matter. The findings are nonspecific and chronic microangiopathic changes should be considered although, demyelinating disease   could have a similar appearance and should be considered in the right clinical setting. There are no new or enhancing lesions identified. Extensive left mastoid opacification. MRI C-spine: Status post ACDF C4-C6 with adequate decompression central canal.  Mild degenerative changes are noted as described above. No cord compression or cord signal normality. There is mild central and bilateral foraminal narrowing identified at C3-4 and   C6-7.     The following portions of the patient's history were reviewed and updated as appropriate: She  has a past medical history of Atrophy of vagina, Avascular necrosis ([address]), Depression, Easy bruisability, Endometriosis, Factor V Leiden mutation [facility]), migraines, Lichen sclerosus, Ovarian cyst, Pelvic pain, Periodic limb movement disorder, Peripheral neuropathy, and Psoriasis (). She   Patient Active Problem List    Diagnosis Date Noted    Cervical radiculopathy at C7 2022    CNS demyelination ([address]) 2022    Paraparesis of both lower limbs ([address]) 2021    Persistent postural-perceptual dizziness 2020    Real time reverse transcriptase PCR positive for COVID-19 virus 2020    Dizziness and giddiness 2020    Numbness and tingling in left arm 2019    Chronic neck pain 2019    Muscle spasms of both lower extremities 2019    Pernicious anemia 2019    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 2019    Other abnormal findings on diagnostic imaging of central nervous system 10/24/2018    Weakness of both lower extremities 10/24/2018    Dysphagia 10/24/2018    Numbness in both legs 10/24/2018    Other spondylosis with radiculopathy, lumbar region 10/24/2018     She  has a past surgical history that includes Hysterectomy; Appendectomy;  section; Laparoscopic ovarian cystectomy; and [place] lumbar puncture diagnostic (2018). Her family history includes Atrial fibrillation in her mother; Diabetes in her mother; Hypertension in her mother; No Known Problems in her father. She  reports that she has been smoking cigarettes. She has a 30.00 pack-year smoking history. She has never used smokeless tobacco. She reports that she does not drink alcohol and does not use drugs.   Current Outpatient Medications   Medication Sig Dispense Refill    atorvastatin (LIPITOR) 10 mg tablet Take 10 mg by mouth daily      citalopram (CeleXA) 20 mg tablet Take 20 mg by mouth daily      clobetasol (TEMOVATE) 0.05 % cream Apply topically 2 (two) times a day as needed      Cobalamine Combinations (B-12) 1000-400 MCG SUBL Inject into a muscle every 30 (thirty) days      Cyanocobalamin 1000 MCG/ML KIT Inject 1,000 mcg into a muscle every 30 (thirty) days      ergocalciferol (VITAMIN D2) 50,000 units TAKE 1 CAPSULE (50,000 UNITS TOTAL) BY MOUTH ONCE A WEEK 4 capsule 0    Humira Pen 40 MG/0.4ML PNKT Inject 40 mg under the skin every 14 (fourteen) days      ketorolac (TORADOL) 10 mg tablet Take 1 tablet (10 mg total) by mouth every 6 (six) hours as needed for moderate pain 10 tablet 5    methocarbamol (ROBAXIN) 750 mg tablet TAKE 1 TABLET (750 MG TOTAL) BY MOUTH EVERY 8 (EIGHT) HOURS 90 tablet 1    SUMAtriptan (IMITREX) 50 mg tablet Take 1 tablet (50 mg total) by mouth once as needed for migraine for up to 1 dose 9 tablet 2    topiramate (Topamax) 25 mg tablet Take 1 tab at night x 3 days, then 1 tab bid x 3 days, then 1 tab am and 2 tabs at night x 3d, then 2 tabs bid  x 3 d; 2 tab in am and then 3 tab at night x 3 d; then 3 tabs bid 180 tablet 5    verapamil (CALAN) 40 mg tablet TAKE 1 TABLET (40 MG TOTAL) BY MOUTH 3 (THREE) TIMES A DAY 60 tablet 2    venlafaxine (EFFEXOR) 37.5 mg tablet Take 37.5 mg by mouth once       No current facility-administered medications for this visit.      Current Outpatient Medications on File Prior to Visit   Medication Sig    atorvastatin (LIPITOR) 10 mg tablet Take 10 mg by mouth daily    citalopram (CeleXA) 20 mg tablet Take 20 mg by mouth daily    clobetasol (TEMOVATE) 0.05 % cream Apply topically 2 (two) times a day as needed    Cobalamine Combinations (B-12) 1000-400 MCG SUBL Inject into a muscle every 30 (thirty) days    Cyanocobalamin 1000 MCG/ML KIT Inject 1,000 mcg into a muscle every 30 (thirty) days    ergocalciferol (VITAMIN D2) 50,000 units TAKE 1 CAPSULE (50,000 UNITS TOTAL) BY MOUTH ONCE A WEEK    Humira Pen 40 MG/0.4ML PNKT Inject 40 mg under the skin every 14 (fourteen) days    methocarbamol (ROBAXIN) 750 mg tablet TAKE 1 TABLET (750 MG TOTAL) BY MOUTH EVERY 8 (EIGHT) HOURS    SUMAtriptan (IMITREX) 50 mg tablet Take 1 tablet (50 mg total) by mouth once as needed for migraine for up to 1 dose    verapamil (CALAN) 40 mg tablet TAKE 1 TABLET (40 MG TOTAL) BY MOUTH 3 (THREE) TIMES A DAY    [DISCONTINUED] metoprolol succinate (TOPROL-XL) 25 mg 24 hr tablet Take 25 mg by mouth daily    venlafaxine (EFFEXOR) 37.5 mg tablet Take 37.5 mg by mouth once    [DISCONTINUED] Buprenorphine HCl (Belbuca) 300 MCG FILM Apply 150 mcg to cheek 2 (two) times a day    [DISCONTINUED] Buprenorphine HCl (Belbuca) 300 MCG FILM Apply 300 mcg to cheek 2 (two) times a day    [DISCONTINUED] buPROPion (WELLBUTRIN XL) 150 mg 24 hr tablet     [DISCONTINUED] LORazepam (ATIVAN) 1 mg tablet Take 1 tab 40 min prior to MRI with a second tab 10 min prior to imaging    [DISCONTINUED] naloxone (NARCAN) 4 mg/0.1 mL nasal spray Administer 1 spray into a nostril. If no response after 2-3 minutes, give another dose in the other nostril using a new spray. [DISCONTINUED] Otezla 30 MG TABS 2 (two) times a day (Patient not taking: Reported on 12/7/2022)    [DISCONTINUED] venlafaxine 37.5 mg 24 hr tablet Take 1 tab a day for 7 days the 2 tabs daily (Patient not taking: Reported on 6/9/2021)     No current facility-administered medications on file prior to visit. She is allergic to morphine, other, and nitrofurantoin. .         Objective:    Blood pressure 108/68, pulse 97, temperature 98.2 °F (36.8 °C), temperature source Temporal, height 5' 7" (1.702 m), weight 104 kg (228 lb 9.6 oz), SpO2 98 %. Physical Exam    Neurological Exam  CONSTITUTIONAL: NAD, pleasant. NECK: supple, no lymphadenopathy, no thyromegaly, no JVD. CARDIOVASCULAR: RRR, normal S1S2, no murmurs, no rubs. RESP: clear to auscultation bilaterally, no wheezes/rhonchi/rales. ABDOMEN: soft, non tender, non distended. SKIN: no rash or skin lesions. EXTREMITIES: no edema, pulses 2+bilaterally. PSYCH: appropriate mood and affect  NEUROLOGIC COMPREHENSIVE EXAM: Patient is oriented to person, place and time, NAD; appropriate affect.  CN II, III, IV, V, VI, VII,VIII,IX,X,XI-XII intact with EOMI, PERRLA, OKN intact, VF grossly intact, fundi poorly visualized secondary to pupillary constriction; symmetric face noted. Motor: 5/5 UE/LE bilateral symmetric; Sensory: intact to light touch and pinprick bilaterally; normal vibration sensation feet bilaterally; Coordination within normal limits on FTN and MARSHA testing; DTR: 2++/4 through, no Babinski, no clonus. Tandem gait is abnormal. Romberg: absent. ROS:    Review of Systems   Constitutional:  Positive for fatigue. Negative for appetite change and fever. HENT: Negative. Negative for hearing loss, tinnitus, trouble swallowing and voice change. Eyes: Negative. Negative for photophobia, pain and visual disturbance. Respiratory: Negative. Negative for shortness of breath. Cardiovascular: Negative. Negative for palpitations. Gastrointestinal: Negative. Negative for nausea and vomiting. Endocrine: Negative. Negative for cold intolerance. Genitourinary: Negative. Negative for dysuria, frequency and urgency. Musculoskeletal:  Negative for back pain, gait problem, myalgias and neck pain. Skin: Negative. Negative for rash. Allergic/Immunologic: Negative. Neurological:  Positive for weakness (both legs). Negative for dizziness, tremors, seizures, syncope, facial asymmetry, speech difficulty, light-headedness, numbness and headaches. Off balance   Hematological: Negative. Does not bruise/bleed easily. Psychiatric/Behavioral: Negative. Negative for confusion, hallucinations and sleep disturbance.

## 2023-12-15 ENCOUNTER — EVALUATION (OUTPATIENT)
Dept: PHYSICAL THERAPY | Facility: REHABILITATION | Age: 53
End: 2023-12-15
Payer: COMMERCIAL

## 2023-12-15 DIAGNOSIS — M62.838 MUSCLE SPASMS OF BOTH LOWER EXTREMITIES: ICD-10-CM

## 2023-12-15 DIAGNOSIS — R29.6 FREQUENT FALLS: ICD-10-CM

## 2023-12-15 DIAGNOSIS — G82.20 PARAPARESIS OF BOTH LOWER LIMBS (HCC): ICD-10-CM

## 2023-12-15 PROCEDURE — 97163 PT EVAL HIGH COMPLEX 45 MIN: CPT | Performed by: PHYSICAL THERAPIST

## 2023-12-15 NOTE — PROGRESS NOTES
PT Evaluation     Today's date: 12/15/2023  Patient name: Elena Multani  : 1970  MRN: [id number]  Referring provider: Miriam Harris, *  Dx:   Encounter Diagnosis     ICD-10-CM    1. Muscle spasms of both lower extremities  M62.838 Ambulatory Referral to Physical Therapy      2. Paraparesis of both lower limbs (HCC)  G82.20 Ambulatory Referral to Physical Therapy      3. Frequent falls  R29.6 Ambulatory Referral to Physical Therapy                     Assessment  Assessment details: Pt is a pleasant 53 y.o. female presenting to outpatient physical therapy with Muscle spasms of both lower extremities, paraparesis of both lower limbs and frequent falls.   Pt presents with decreased right hip range of motion, decreased bilateral lower extremity bilateral hip, core and knee strength, impaired balance, gait symmetry/safety and decreased tolerance to activity. Pt is a good candidate for outpatient physical therapy and would benefit from skilled physical therapy to address limitations and to achieve goals. Thank you for this referral.   Impairments: abnormal coordination, abnormal gait, abnormal or restricted ROM, activity intolerance, impaired balance, impaired physical strength, lacks appropriate home exercise program, pain with function and poor posture   Understanding of Dx/Px/POC: good   Prognosis: good    Goals  Short-Term Goals (4 weeks)   1. Patient will decrease worst rating of pain by 25% to improve quality of life.  2. Patient will increase strength by 1/2 MMT to improve quality of life with improved efficiency of daily activities.  3. Patient will improve ROM by 25% indicating improved mobility of affected area.    Long-Term Goals (8 weeks)   1. Patient will decrease pain by 50% at worst in comparison to IE indicating significant reduction in pain and improved quality of life.  2. Patient will demonstrate strength WFL compared to IE levels indicating ability to independently manage pain symptoms  to accomplish daily activities.   3. Patient will be independent with HEP with good form accomplished.      Plan  Patient would benefit from: PT eval and skilled PT  Planned modality interventions: cryotherapy and thermotherapy: hydrocollator packs  Planned therapy interventions: IADL retraining, body mechanics training, flexibility, functional ROM exercises, home exercise program, neuromuscular re-education, manual therapy, postural training, strengthening, stretching, therapeutic activities, therapeutic exercise and joint mobilization  Frequency: 2x week  Duration in visits: 20  Duration in weeks: 12  Treatment plan discussed with: patient        Subjective Evaluation    History of Present Illness  Mechanism of injury: Pt is a 53 year-old right-handed female with complex PMH presenting to PT with 8 year history of progressive bilateral LE weakness, paraparesis and frequent falls. She reports she began having issues in 2015. She reports she was initially diagnosed with RA around 2012. She reports she had C4-C6 ACDF performed by Dr. Conde in 2012, left TSR in 2009 and 2021, both performed by Dr. Watson at [facility]. She reports she receives bi-weekly Humera injections. She is unable to take NSAIDS due to her medical history. Pt follows with pain management and neurology. Pt reports she has had 3 significant falls in the past year, her most recent fall was on 4/28/23, in which she sustained meniscal tear with MFC fracture, underwent surgery for this on 9/29/23. Pt also underwent left tympanostomy tube replacement 1/12/23 performed by Dr. Campos.    Pt reports her B LE weakness has progressively worsened over the past 2 months, as well as an aching/vibrating sensation in both of her legs.    She reports she has the sensation that she cannot feel where her legs are when she is walking and going up/down stairs.     Pt is on disability.    Pain Location: bilateral LE aching, tingling, vibrating    Pain  Type: bilateral anterior thigh    Pain Intensity:  Current: 2  Best: 2  Worst: 8    Pt reports increased pain and/or difficulty with: sit-stand transfers, immediate standing balance, walking, stairs.    Pt reports decreased pain with: rest, medication            Recurrent probem    Quality of life: good    Patient Goals  Patient goals for therapy: increased strength, independence with ADLs/IADLs, improved balance, decreased pain and increased motion        Objective     Concurrent Complaints  Negative for night pain, disturbed sleep, bladder dysfunction, bowel dysfunction and saddle (S4) numbness    Neurological Testing     Sensation     Lumbar   Left   Intact: light touch    Right   Intact: light touch    Reflexes   Left   Patellar (L4): normal (2+)  Achilles (S1): normal (2+)    Right   Patellar (L4): normal (2+)  Achilles (S1): normal (2+)    Active Range of Motion     Lumbar   Flexion:  WFL  Extension:  Restriction level: minimal  Left lateral flexion:  WFL  Right lateral flexion:  WFL  Left rotation:  WFL  Right rotation:  WFL  Left Hip   Flexion: WFL  Extension: -5 degrees   Abduction: WFL  External rotation (prone): WFL  Internal rotation (prone): WFL    Right Hip   Flexion: WFL  Extension: -5 degrees   Abduction: WFL  External rotation (prone): 15 degrees   Internal rotation (prone): 10 degrees     Strength/Myotome Testing     Left Hip   Planes of Motion   Flexion: 4+  Extension: 3+  Abduction: 3+  Adduction: 4+  External rotation: 4-  Internal rotation: 4-    Right Hip   Planes of Motion   Flexion: 4+  Extension: 3+  Abduction: 3+  Adduction: 4+  External rotation: 4-  Internal rotation: 4-    Left Knee   Flexion: 4+  Extension: 4+    Right Knee   Flexion: 4+  Extension: 4+    Left Ankle/Foot   Dorsiflexion: 4+  Plantar flexion: 5    Right Ankle/Foot   Dorsiflexion: 4+  Plantar flexion: 5    Tests     Left Hip   Modified Jorge Alberto: Positive.   90/90 SLR: Positive.     Right Hip   Modified Jorge Alberto: Positive.    90/90 SLR: Positive.              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    Chronic neck pain    Numbness and tingling in left arm    Real time reverse transcriptase PCR positive for COVID-19 virus    Dizziness and giddiness    Persistent postural-perceptual dizziness    Paraparesis of both lower limbs (HCC)    Cervical radiculopathy at C7    CNS demyelination (HCC)         Daily Treatment Diary      Assessment  12/15                     Eval/Reval  MD  Vestibular IE                   FOTO         **         **   Manuals                                                     Prescribed POC    Pt Education  MD                      HEP Issued/Updated  MD                      Bike                        [employer] LOS                        Bi[employer]x Wt Shifts                        FTEO - Airex                        FAEC - Floor                        Tandem Balance                                                                        Goal Oriented Functional Activity:                                                Modalities                                        QR Code:    [facility] [employer] HEP:

## 2023-12-19 ENCOUNTER — EVALUATION (OUTPATIENT)
Dept: PHYSICAL THERAPY | Facility: REHABILITATION | Age: 53
End: 2023-12-19
Payer: COMMERCIAL

## 2023-12-19 DIAGNOSIS — H81.90 VESTIBULAR DYSFUNCTION, UNSPECIFIED LATERALITY: ICD-10-CM

## 2023-12-19 DIAGNOSIS — R29.6 FREQUENT FALLS: Primary | ICD-10-CM

## 2023-12-19 PROCEDURE — 97112 NEUROMUSCULAR REEDUCATION: CPT | Performed by: PHYSICAL THERAPIST

## 2023-12-19 NOTE — PROGRESS NOTES
PT Re-Evaluation     Today's date: 2023  Patient name: Elena Multani  : 1970  MRN: [id number]  Referring provider: Miriam Harris, *  Dx:   Encounter Diagnosis     ICD-10-CM    1. Frequent falls  R29.6       2. Vestibular dysfunction, unspecified laterality  H81.90                      Assessment  Assessment details: Pt is a pleasant 53 y.o. female presenting to outpatient physical therapy with Frequent falls  (primary encounter diagnosis)  Vestibular dysfunction, unspecified laterality .     Pt presents displays BLE strength WFL, oculomotor functioning WFL, and negative testing for BPPV/positional vertigo. Displays abnormal static and dynamic balance, particularly with vision suppressed and on compliant surfaces.    Pt is a good candidate for outpatient physical therapy and would benefit from skilled physical therapy to address limitations and to achieve goals. Thank you for this referral.     Impairments: abnormal coordination, abnormal gait, abnormal or restricted ROM, impaired balance, impaired physical strength, pain with function and weight-bearing intolerance  Understanding of Dx/Px/POC: good   Prognosis: good    Goals  ST. Patient will </= 2 falls in 4 weeks.  2. Patient will demonstrate 25% improvement in balance in 4 weeks.  3. Patient will demonstrate 1/2 grade improvement in strength in 4 weeks.    LT. Patient will be able to perform IADLS without restriction or pain by discharge.  2. Patient will be independent in HEP by discharge.  3. Patient will be able to return to recreational/work duties without restriction or pain by discharge.      Plan  Patient would benefit from: PT eval and skilled PT  Planned therapy interventions: IADL retraining, body mechanics training, flexibility, functional ROM exercises, home exercise program, neuromuscular re-education, manual therapy, postural training, strengthening, stretching, therapeutic activities, therapeutic exercise, abdominal  trunk stabilization, balance/weight bearing training, gait training, transfer training and self care  Frequency: 2x week  Duration in visits: 20  Duration in weeks: 12  Treatment plan discussed with: patient        Subjective Evaluation    History of Present Illness  Mechanism of injury: 12/19/23  Pt comes to therapy reporting chronic history of balance concerns, starting around 2015, of unknown etiology. Reports she has had many evaluations and consultations over the years for these concerns, as she has sustained many falls as a result. Notes lesions were found on her brain, therefore, is re-evaluated each year. Reports she also experiences dizziness with these instances of imbalance, however, denies room spinning or symptoms with transitional movements/transfers.   Ambulates into clinic with [facility] [place], [place], which she states she utilizes outside the home. Reports she feels imbalanced and dizzy a random times, but notes driving and navigating uneven surfaces.   Patient Goals  Patient goals for therapy: improved balance    Pain  No pain reported        Objective   Neuro Exam:     Oculomotor exam   Oculomotor ROM: WNL  Resting nystagmus: not present   Gaze holding nystagmus: not present left  and not present right  Smooth pursuits: within normal limits  Vertical saccades: normal and VOR WFL  Horizontal saccades: normal and VOR WFL    Positional testing   Tilden-Hallpike   Left posterior canal: WNL  Right posterior canal: WNL  Roll test   Left horizontal canal: WNL  Right horizontal canal: WNL             Precautions: h/o AVN    Daily Treatment Diary    Date 12/19            FOTO np            Re-Eval FIORDALIZA               Manuals                                                        Neuro Re-Ed     FT/EC balance             FT/EO balance w/head turns             Tandem balance                          Tandem walking             Side stepping                          Biodex LOS             Biodex WS (AP, ML)                                        Ther Ex    Bike              Step ups                                       Ther Activity                              Gait Training                              Modalities

## 2023-12-21 ENCOUNTER — APPOINTMENT (OUTPATIENT)
Dept: PHYSICAL THERAPY | Facility: REHABILITATION | Age: 53
End: 2023-12-21
Payer: COMMERCIAL

## 2023-12-26 ENCOUNTER — OFFICE VISIT (OUTPATIENT)
Dept: PHYSICAL THERAPY | Facility: REHABILITATION | Age: 53
End: 2023-12-26
Payer: COMMERCIAL

## 2023-12-26 DIAGNOSIS — M62.838 MUSCLE SPASMS OF BOTH LOWER EXTREMITIES: ICD-10-CM

## 2023-12-26 DIAGNOSIS — R29.6 FREQUENT FALLS: Primary | ICD-10-CM

## 2023-12-26 DIAGNOSIS — H81.90 VESTIBULAR DYSFUNCTION, UNSPECIFIED LATERALITY: ICD-10-CM

## 2023-12-26 DIAGNOSIS — G82.20 PARAPARESIS OF BOTH LOWER LIMBS (HCC): ICD-10-CM

## 2023-12-26 PROCEDURE — 97110 THERAPEUTIC EXERCISES: CPT | Performed by: PHYSICAL THERAPIST

## 2023-12-26 PROCEDURE — 97112 NEUROMUSCULAR REEDUCATION: CPT | Performed by: PHYSICAL THERAPIST

## 2023-12-26 NOTE — PROGRESS NOTES
Daily Note     Today's date: 2023  Patient name: Elena Multani  : 1970  MRN: [id number]  Referring provider: Miriam Harris, *  Dx:   Encounter Diagnosis     ICD-10-CM    1. Frequent falls  R29.6       2. Muscle spasms of both lower extremities  M62.838       3. Paraparesis of both lower limbs (HCC)  G82.20       4. Vestibular dysfunction, unspecified laterality  H81.90                      Subjective: Pt with no new complaints since IE. She presents to session with SPC in [facility] today.    Objective: See treatment diary below.  Direction Control   Goal   Overall 39  65   Forward 46  65   Backward 54  30   Left  43  65   Right  48  65   Forward/Left 38  65   Forward/Right 41  65   Backward/Left 31  65   Backward/Right 49  65     Assessment: Pt with good tolerance to addition to bike warm-up and hip/core strengthening exercises. She required close supervision/contact guard with all balance exercises with verbal cues for position and safety. Balance testing completed on [employer] with results listed above. Will continue to progress program and balance training as able. Pt will benefit from continued skilled PT intervention in order to address her remaining limitations and to restore maximal function.      Plan: Continue per plan of care.  Progress treatment as tolerated.         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    Chronic neck pain    Numbness and tingling in left arm    Real time reverse transcriptase PCR positive for COVID-19 virus    Dizziness and giddiness    Persistent postural-perceptual dizziness    Paraparesis of both lower limbs (HCC)    Cervical radiculopathy  "at C7    CNS demyelination (HCC)     Daily Treatment Diary      Assessment  12/15  12/19  12/26                 Eval/Reval  MD  Vestibular IE                   FOTO         **         **   Manuals                                                     Prescribed POC    Pt Education  MD                      HEP Issued/Updated  MD                      [place]      L1x5'                  Bridges + Hip ADD    5\"   1x10           Bridges + TB Hip ABD    [place]   5\"   1x10           Biodex Testing    See Results Above           Biodex LOS      Level 1   Trial 1: 45\"   51%   Trial 2: 38\"   44%   Trial 3: 38\"   61%                  Biodex Wt Shifts                        FTEO - Airex      10\"x 3   CS/CG                  FAEC - Floor      10\"x 3   CS/CG                  FAEO - Head Turns L/R     5 turns ea    CS/CG          FAEO - Head Nods Up/Down    Airex   5 nods ea   CS/CG           Tandem Balance - Modified      10\" x 2 ea                  Tandem Walking                        Sidestepping                        Step Up & Over                        Gait with Head Turns              Gaith with Head Nods              Obstacle Course                       Modalities                                             "

## 2023-12-29 ENCOUNTER — OFFICE VISIT (OUTPATIENT)
Dept: PHYSICAL THERAPY | Facility: REHABILITATION | Age: 53
End: 2023-12-29
Payer: COMMERCIAL

## 2023-12-29 DIAGNOSIS — M62.838 MUSCLE SPASMS OF BOTH LOWER EXTREMITIES: ICD-10-CM

## 2023-12-29 DIAGNOSIS — R29.6 FREQUENT FALLS: Primary | ICD-10-CM

## 2023-12-29 DIAGNOSIS — H81.90 VESTIBULAR DYSFUNCTION, UNSPECIFIED LATERALITY: ICD-10-CM

## 2023-12-29 DIAGNOSIS — G82.20 PARAPARESIS OF BOTH LOWER LIMBS (HCC): ICD-10-CM

## 2023-12-29 PROCEDURE — 97112 NEUROMUSCULAR REEDUCATION: CPT | Performed by: PHYSICAL MEDICINE & REHABILITATION

## 2023-12-29 PROCEDURE — 97110 THERAPEUTIC EXERCISES: CPT | Performed by: PHYSICAL MEDICINE & REHABILITATION

## 2023-12-29 NOTE — PROGRESS NOTES
Daily Note     Today's date: 2023  Patient name: Elena Multani  : 1970  MRN: [id number]  Referring provider: Miriam Harris, *  Dx:   Encounter Diagnosis     ICD-10-CM    1. Frequent falls  R29.6       2. Muscle spasms of both lower extremities  M62.838       3. Paraparesis of both lower limbs (HCC)  G82.20       4. Vestibular dysfunction, unspecified laterality  H81.90                    Subjective: Patient offers no new complaints to begin session. No issues following last visit.     Objective: See treatment diary below.  Direction Control   Goal   Overall 39  65   Forward 46  65   Backward 54  30   Left  43  65   Right  48  65   Forward/Left 38  65   Forward/Right 41  65   Backward/Left 31  65   Backward/Right 49  65     Assessment: Patient tolerated treatment well overall, improved balance control today vs. Last visit. No manual assist required this date for balance maintenance. Continue as able nv.      Plan: Continue per plan of care.  Progress treatment as tolerated.         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    Chronic neck pain    Numbness and tingling in left arm    Real time reverse transcriptase PCR positive for COVID-19 virus    Dizziness and giddiness    Persistent postural-perceptual dizziness    Paraparesis of both lower limbs (HCC)    Cervical radiculopathy at C7    CNS demyelination (HCC)     Daily Treatment Diary      Assessment  12/15  12/19  12/26  12/29               Alcon/Chance FLAHERTY  Vestibular IE                   FOTO         **         **   Manuals                                                     Prescribed POC    Pt Education  MD          "             HEP Issued/Updated  MD [place]      L1x5'  L1, 5'                Bridges + Hip ADD    5\"   1x10 2x10x5\"          Bridges + TB Hip ABD    [place]   5\"   1x10 OTB 1x10x5\"          Biodex Testing    See Results Above           Biodex LOS      Level 1   Trial 1: 45\"   51%   Trial 2: 38\"   44%   Trial 3: 38\"   61%  easy   64%  75%  69%  65%  No UEs                Biodex Wt Shifts                        FTEO - Airex      10\"x 3   CS/CG  3x10\"                FAEC - Floor      10\"x 3   CS/CG  3x10\"                FAEO - Head Turns L/R     5 turns ea    CS/CG 5x ea L/R up/down         FAEO - Head Nods Up/Down    Airex   5 nods ea   CS/CG Airex   5 nods ea   CS/CG          Tandem Balance - Modified      10\" x 2 ea  2x10\" ea                Tandem Walking                        Sidestepping                        Step Up & Over                        Gait with Head Turns              Gaith with Head Nods              Obstacle Course                       Modalities                                             "

## 2024-01-02 ENCOUNTER — OFFICE VISIT (OUTPATIENT)
Dept: PHYSICAL THERAPY | Facility: REHABILITATION | Age: 54
End: 2024-01-02
Payer: COMMERCIAL

## 2024-01-02 DIAGNOSIS — M62.838 MUSCLE SPASMS OF BOTH LOWER EXTREMITIES: ICD-10-CM

## 2024-01-02 DIAGNOSIS — H81.90 VESTIBULAR DYSFUNCTION, UNSPECIFIED LATERALITY: ICD-10-CM

## 2024-01-02 DIAGNOSIS — R29.6 FREQUENT FALLS: Primary | ICD-10-CM

## 2024-01-02 DIAGNOSIS — G82.20 PARAPARESIS OF BOTH LOWER LIMBS (HCC): ICD-10-CM

## 2024-01-02 PROCEDURE — 97112 NEUROMUSCULAR REEDUCATION: CPT

## 2024-01-02 PROCEDURE — 97110 THERAPEUTIC EXERCISES: CPT

## 2024-01-02 NOTE — PROGRESS NOTES
Daily Note     Today's date: 2024  Patient name: Elena Multani  : 1970  MRN: [id number]  Referring provider: Miriam Harris, *  Dx:   Encounter Diagnosis     ICD-10-CM    1. Frequent falls  R29.6       2. Muscle spasms of both lower extremities  M62.838       3. Paraparesis of both lower limbs (HCC)  G82.20       4. Vestibular dysfunction, unspecified laterality  H81.90                    Subjective: Pt states she fell at home this morning. She states she lost her balance backwards, denies any injuries or pain. She states she felt good following lv and that she was happy with how her balance felt during her treatment.    Objective: See treatment diary below.  Direction Control   Goal   Overall 39  65   Forward 46  65   Backward 54  30   Left  43  65   Right  48  65   Forward/Left 38  65   Forward/Right 41  65   Backward/Left 31  65   Backward/Right 49  65     Assessment: Patient with good tolerance to treatment and progression of program as listed below. Demonstrates improved balance, no assist required from PTA for steadying. Pt with increased awareness of posture and positioning with exercises, requiring less cuing. Challenged during gait with head turns. Pt would benefit from continued skilled PT to improve current deficits and maximize function.     Plan: Continue per plan of care.  Progress treatment as tolerated.         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    Chronic neck pain    Numbness and tingling in left arm    Real time reverse transcriptase PCR positive for COVID-19 virus    Dizziness and giddiness    Persistent postural-perceptual dizziness     "Paraparesis of both lower limbs (HCC)    Cervical radiculopathy at C7    CNS demyelination (HCC)     Daily Treatment Diary      Assessment  12/15  12/19  12/26  12/29  1/2             Alcon/Chance FLAHERTY  Vestibular VALERIA                   FOTO         **         **   Manuals                                                     Prescribed POC    Pt Education  MD                      HEP Issued/Updated  MD                      Bike      L1x5'  L1, 5'  L1x5'              Bridges + Hip ADD    5\"   1x10 2x10x5\" 5\"  2x10         Bridges + TB Hip ABD    [place]   5\"   1x10 OTB 1x10x5\" OTB  5\"  3x5           Biodex Testing    See Results Above           Biodex LOS      Level 1   Trial 1: 45\"   51%   Trial 2: 38\"   44%   Trial 3: 38\"   61%  easy   64%  75%  69%  65%  No UEs  easy  81%  76%  86%  78%  No UEs              Biodex Wt Shifts                        FTEO - Airex      10\"x 3   CS/CG  3x10\"  4x10\"              FAEC - Floor      10\"x 3   CS/CG  3x10\"  4x10\"              FAEO - Head Turns L/R     5 turns ea    CS/CG 5x ea L/R up/down 10x ea L/R up/down        FAEO - Head Nods Up/Down    Airex   5 nods ea   CS/CG Airex   5 nods ea   CS/CG Airex  10 nods ea CS/CG         Tandem Balance - Modified      10\" x 2 ea  2x10\" ea  3x10\" ea              Tandem Walking                        Sidestepping                        Step Up & Over                        Gait with Head Turns     50 ft x2  CG         Gaith with Head Nods              Obstacle Course                       Modalities                                             "

## 2024-01-05 ENCOUNTER — OFFICE VISIT (OUTPATIENT)
Dept: PHYSICAL THERAPY | Facility: REHABILITATION | Age: 54
End: 2024-01-05
Payer: COMMERCIAL

## 2024-01-05 DIAGNOSIS — H81.90 VESTIBULAR DYSFUNCTION, UNSPECIFIED LATERALITY: ICD-10-CM

## 2024-01-05 DIAGNOSIS — G82.20 PARAPARESIS OF BOTH LOWER LIMBS (HCC): ICD-10-CM

## 2024-01-05 DIAGNOSIS — M62.838 MUSCLE SPASMS OF BOTH LOWER EXTREMITIES: ICD-10-CM

## 2024-01-05 DIAGNOSIS — R29.6 FREQUENT FALLS: Primary | ICD-10-CM

## 2024-01-05 PROCEDURE — 97110 THERAPEUTIC EXERCISES: CPT | Performed by: PHYSICAL THERAPIST

## 2024-01-05 PROCEDURE — 97112 NEUROMUSCULAR REEDUCATION: CPT | Performed by: PHYSICAL THERAPIST

## 2024-01-05 NOTE — PROGRESS NOTES
"Daily Note     Today's date: 2024  Patient name: Elena Multani  : 1970  MRN: [id number]  Referring provider: Miriam Harris, *  Dx:   Encounter Diagnosis     ICD-10-CM    1. Frequent falls  R29.6       2. Muscle spasms of both lower extremities  M62.838       3. Paraparesis of both lower limbs (HCC)  G82.20       4. Vestibular dysfunction, unspecified laterality  H81.90                    Subjective: Pt reports she feels \"a little off\" today for unknown reason.     Objective: See treatment diary below.    Assessment: Pt with good tolerance to progression of balance exercises and hip/core strengthening exercises. She required close supervision/contact guard with all balance exercises with verbal cues for position and safety, however, is progressing very well with these. Balance training with Memeoirs improved, added RC with good challenge. Modified bridges to clamshells due to c/o right PSIS discomfort, pain resolved with modification. Will continue to progress program and balance training as able. Pt will benefit from continued skilled PT intervention in order to address her remaining limitations and to restore maximal function.      Plan: Continue per plan of care.  Progress treatment as tolerated.         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    Chronic neck pain    Numbness and tingling in left arm    Real time reverse transcriptase PCR positive for COVID-19 virus    Dizziness and giddiness    Persistent postural-perceptual dizziness    Paraparesis of both lower limbs (HCC)    Cervical radiculopathy at C7    CNS demyelination (HCC)     Daily Treatment Diary      Assessment  " "12/15  12/19  12/26  12/29  1/2  1/5           Eval/Reval  MD  Vestibular IE                   FOTO         **         **   Manuals                                                     Prescribed POC    Pt Education  MD                      HEP Issued/Updated  MD Bermudez      L1x5'  L1, 5'  L1x5'  L1x5'            Bridges + Hip ADD    5\"   1x10 2x10x5\" 5\"  2x10 5\"  2x10        Bridges + TB Hip ABD    [place]   5\"   1x10 OTB 1x10x5\" OTB  5\"  3x5   Clamshells  5\"  2x10 ea        Biodex Testing    See Results Above           Biodex LOS      Level 1   Trial 1: 45\"   51%   Trial 2: 38\"   44%   Trial 3: 38\"   61%  easy   64%  75%  69%  65%  No UEs  easy  81%  76%  86%  78%  No UEs  Level 1  Trial 1: 38\"   66%   Trial 2: 32\"   72%   Trial 3: 36\"   66%              Biodex - [place]            Level 1  Trial 1: 33\"   89%   Trial 2: 34\"   85%   Trial 3: 29\"   92%            FTEO - Airex      10\"x 3   CS/CG  3x10\"  4x10\" 10\"x1  15\"x1  20\"x1            FAEC - Floor      10\"x 3   CS/CG  3x10\"  4x10\" 10\"x1  15\"x1  20\"x1            FAEO - Head Turns L/R     5 turns ea    CS/CG 5x ea L/R up/down 10x ea L/R up/down Airex  10 nods  CS/CG       FAEO - Head Nods Up/Down    Airex   5 nods ea   CS/CG Airex   5 nods ea   CS/CG Airex  10 nods ea CS/CG Airex  10 nods x2 CS/CG        Tandem Balance - Modified      10\" x 2 ea  2x10\" ea  3x10\" ea  D/C           Tandem Balance      10\"x 2 ea  15\"x 2 ea        Tandem Walking                        Sidestepping                        Step Up & Over                        Gait with Head Turns     50 ft x2  CG Up/Down  Side/Side  @ Railing  4 laps ea  CS        Gaith with Head Nods              Obstacle Course                       Modalities                                             "

## 2024-01-09 ENCOUNTER — OFFICE VISIT (OUTPATIENT)
Dept: PHYSICAL THERAPY | Facility: REHABILITATION | Age: 54
End: 2024-01-09
Payer: COMMERCIAL

## 2024-01-09 DIAGNOSIS — M62.838 MUSCLE SPASMS OF BOTH LOWER EXTREMITIES: ICD-10-CM

## 2024-01-09 DIAGNOSIS — R29.6 FREQUENT FALLS: Primary | ICD-10-CM

## 2024-01-09 DIAGNOSIS — G82.20 PARAPARESIS OF BOTH LOWER LIMBS (HCC): ICD-10-CM

## 2024-01-09 DIAGNOSIS — H81.90 VESTIBULAR DYSFUNCTION, UNSPECIFIED LATERALITY: ICD-10-CM

## 2024-01-09 PROCEDURE — 97112 NEUROMUSCULAR REEDUCATION: CPT

## 2024-01-09 PROCEDURE — 97110 THERAPEUTIC EXERCISES: CPT

## 2024-01-09 PROCEDURE — 97530 THERAPEUTIC ACTIVITIES: CPT

## 2024-01-09 NOTE — PROGRESS NOTES
"Daily Note     Today's date: 2024  Patient name: Elena Multani  : 1970  MRN: [id number]  Referring provider: Miriam Harris, *  Dx:   Encounter Diagnosis     ICD-10-CM    1. Frequent falls  R29.6       2. Muscle spasms of both lower extremities  M62.838       3. Paraparesis of both lower limbs (HCC)  G82.20       4. Vestibular dysfunction, unspecified laterality  H81.90                    Subjective: Pt reports she is feeling like she is \"going to the left\" today. She reports she had a good weekend and was able to shovel a little snow without issue. She notes some mild improvements since beginning PT.    Objective: See treatment diary below.    Assessment: Pt with good tolerance to progression of program as listed below. Treatment performed with direct supervision, CG/CS with all balance activities. Pt demonstrates improvement in both static balance and head turns on this date and is able to progress hold times, performs exercises with increased ease. Demonstrates safety, no overt LOB or assist required for balance from PTA. Appropriate challenge and fatigue with all activity and core strengthening without adverse response. Will continue to progress as tolerated. Pt would benefit from continued skilled PT to improve current deficits and maximize function.    Plan: Continue per plan of care.  Progress treatment as tolerated.         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    Chronic neck pain    Numbness and tingling in left arm    Real time reverse transcriptase PCR positive for COVID-19 virus    Dizziness and giddiness    Persistent postural-perceptual dizziness    " "Paraparesis of both lower limbs (HCC)    Cervical radiculopathy at C7    CNS demyelination (HCC)     Daily Treatment Diary      Assessment  12/15  12/19  12/26  12/29  1/2  1/5  1/9         Alcon/Reval  MD  Vestibular IE                   FOTO         **         **   Manuals                                                     Prescribed POC    Pt Education  MD                      HEP Issued/Updated  MD                      Bike      L1x5'  L1, 5'  L1x5'  L1x5'  L1x5'          Bridges + Hip ADD    5\"   1x10 2x10x5\" 5\"  2x10 5\"  2x10 5\"  2x10       Bridges + TB Hip ABD    [place]   5\"   1x10 OTB 1x10x5\" OTB  5\"  3x5   Clamshells  5\"  2x10 ea Clamshells  5\"  2x10 ea       Biodex Testing    See Results Above           Biodex LOS      Level 1   Trial 1: 45\"   51%   Trial 2: 38\"   44%   Trial 3: 38\"   61%  easy   64%  75%  69%  65%  No UEs  easy  81%  76%  86%  78%  No UEs  Level 1  Trial 1: 38\"   66%   Trial 2: 32\"   72%   Trial 3: 36\"   66%    level 1  Trial 1: 35\"  64%  Trial 2: 38\"  82%  Trial 3: 37\"  80%          Biodex - Maze            Level 1  Trial 1: 33\"   89%   Trial 2: 34\"   85%   Trial 3: 29\"   92%  Level 1  Trial 1:32\"  89%  Trial 2: 28\"  100%  Trial 3:31\"  100%          FTEO - Airex      10\"x 3   CS/CG  3x10\"  4x10\" 10\"x1  15\"x1  20\"x1  15\"x1  20\"x1  25\"x1          FAEC - Floor      10\"x 3   CS/CG  3x10\"  4x10\" 10\"x1  15\"x1  20\"x1  10\"x1  15\"x1  20\"x1          FAEO - Head Turns L/R     5 turns ea    CS/CG 5x ea L/R up/down 10x ea L/R up/down Airex  10 nods  CS/CG Airex  10 nods  CS/CG      FAEO - Head Nods Up/Down    Airex   5 nods ea   CS/CG Airex   5 nods ea   CS/CG Airex  10 nods ea CS/CG Airex  10 nods x2 CS/CG Airex  10 nods x2  CS/CG       Tandem Balance - Modified      10\" x 2 ea  2x10\" ea  3x10\" ea  D/C           Tandem Balance      10\"x 2 ea  15\"x 2 ea 15\"x3 ea       Tandem Walking                        Sidestepping                        Step Up & Over                        Gait with Head Turns   "   50 ft x2  CG Up/Down  Side/Side  @ [place]  4 laps ea  CS Up/down  Side/side  @ railing  4 laps ea   CS       Gaith with Head Nods              Obstacle Course                       Modalities

## 2024-01-12 ENCOUNTER — APPOINTMENT (OUTPATIENT)
Dept: PHYSICAL THERAPY | Facility: REHABILITATION | Age: 54
End: 2024-01-12
Payer: COMMERCIAL

## 2024-01-16 ENCOUNTER — OFFICE VISIT (OUTPATIENT)
Dept: PHYSICAL THERAPY | Facility: REHABILITATION | Age: 54
End: 2024-01-16
Payer: COMMERCIAL

## 2024-01-16 DIAGNOSIS — G82.20 PARAPARESIS OF BOTH LOWER LIMBS (HCC): ICD-10-CM

## 2024-01-16 DIAGNOSIS — M62.838 MUSCLE SPASMS OF BOTH LOWER EXTREMITIES: ICD-10-CM

## 2024-01-16 DIAGNOSIS — R29.6 FREQUENT FALLS: Primary | ICD-10-CM

## 2024-01-16 DIAGNOSIS — H81.90 VESTIBULAR DYSFUNCTION, UNSPECIFIED LATERALITY: ICD-10-CM

## 2024-01-16 PROCEDURE — 97112 NEUROMUSCULAR REEDUCATION: CPT

## 2024-01-16 PROCEDURE — 97110 THERAPEUTIC EXERCISES: CPT

## 2024-01-16 NOTE — PROGRESS NOTES
"Daily Note     Today's date: 2024  Patient name: Elena Multani  : 1970  MRN: [id number]  Referring provider: Miriam Harris, *  Dx:   Encounter Diagnosis     ICD-10-CM    1. Frequent falls  R29.6       2. Muscle spasms of both lower extremities  M62.838       3. Paraparesis of both lower limbs (HCC)  G82.20       4. Vestibular dysfunction, unspecified laterality  H81.90                    Subjective: Pt reports she feels improvements since beginning PT. She denies adverse response to lv.    Objective: See treatment diary below.    Assessment: Pt with good tolerance to progression of program as listed below. Treatment performed with direct supervision, CG/CS with all balance activities. Pt with some challenge initiating exercises on this date, however, balance improves with repetition of exercises. Trialed next level of Maze on Biodex, pt with increased difficulty completing and states she feels \"off balance\" and unable to complete full trial. Improved gait with head turns on this date. Good tolerance to initiation of side stepping. Will continue to gradually progress as tolerated. PT would benefit from continued skilled PT to improve current deficits and maximize function.      Plan: Continue per plan of care.  Progress treatment as tolerated.         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    Chronic neck pain    Numbness and tingling in left arm    Real time reverse transcriptase PCR positive for COVID-19 virus    Dizziness and giddiness    Persistent postural-perceptual dizziness    Paraparesis of both lower limbs (HCC)    Cervical radiculopathy at C7    CNS demyelination " "(HCC)     Daily Treatment Diary      Assessment  12/15  12/19  12/26  12/29  1/2  1/5  1/9  1/16       Eval/Reval  MD  Vestibular IE                   FOTO         **         **   Manuals                                                     Prescribed POC    Pt Education  MD                      HEP Issued/Updated  MD Bermudez      L1x5'  L1, 5'  L1x5'  L1x5'  L1x5'  L1x5'        Bridges + Hip ADD    5\"   1x10 2x10x5\" 5\"  2x10 5\"  2x10 5\"  2x10 5\"  2x10      Bridges + TB Hip ABD    [place]   5\"   1x10 OTB 1x10x5\" OTB  5\"  3x5   Clamshells  5\"  2x10 ea Clamshells  5\"  2x10 ea Clamshells  OTB  5\"   1x10 ea      Biodex Testing    See Results Above           Biodex LOS      Level 1   Trial 1: 45\"   51%   Trial 2: 38\"   44%   Trial 3: 38\"   61%  easy   64%  75%  69%  65%  No UEs  easy  81%  76%  86%  78%  No UEs  Level 1  Trial 1: 38\"   66%   Trial 2: 32\"   72%   Trial 3: 36\"   66%    level 1  Trial 1: 35\"  64%  Trial 2: 38\"  82%  Trial 3: 37\"  80%  Level 1  Trial 1: 39\" 78%  Trial 2: 35\"  83%  Trial 3: 35\"  84%    Level 2  45\"  79%          Biodex - Maze            Level 1  Trial 1: 33\"   89%   Trial 2: 34\"   85%   Trial 3: 29\"   92%  Level 1  Trial 1:32\"  89%  Trial 2: 28\"  100%  Trial 3:31\"  100%  level 1  Trial 1: 28\"  100%  Trial 2: 47\"  98%  Level 2  Trial 1:        FTEO - Airex      10\"x 3   CS/CG  3x10\"  4x10\" 10\"x1  15\"x1  20\"x1  15\"x1  20\"x1  25\"x1  15\"x1  20\"x1  25\"x1        FAEC - Floor      10\"x 3   CS/CG  3x10\"  4x10\" 10\"x1  15\"x1  20\"x1  10\"x1  15\"x1  20\"x1  10\"x1  15\"x1  20\"x1        FAEO - Head Turns L/R     5 turns ea    CS/CG 5x ea L/R up/down 10x ea L/R up/down Airex  10 nods  CS/CG Airex  10 nods  CS/CG Airex  10 nodsx2  CS/CG     FAEO - Head Nods Up/Down    Airex   5 nods ea   CS/CG Airex   5 nods ea   CS/CG Airex  10 nods ea CS/CG Airex  10 nods x2 CS/CG Airex  10 nods x2  CS/CG Airex  10 nodsx2  CS/CG      Tandem Balance - Modified      10\" x 2 ea  2x10\" ea  3x10\" ea  D/C         " "  Tandem Balance      10\"x 2 ea  15\"x 2 ea 15\"x3 ea 15\"x3 ea      Tandem Walking                        Sidestepping                2 laps @ counter        Step Up & Over                        Gait with Head Turns     50 ft x2  CG Up/Down  Side/Side  @ Railing  4 laps ea  CS Up/down  Side/side  @ railing  4 laps ea   CS Up/down  Side/side  @ counter  3 lapts ea  CS      Gaith with Head Nods              Obstacle Course                       Modalities                                             "

## 2024-01-18 ENCOUNTER — OFFICE VISIT (OUTPATIENT)
Dept: PHYSICAL THERAPY | Facility: REHABILITATION | Age: 54
End: 2024-01-18
Payer: COMMERCIAL

## 2024-01-18 DIAGNOSIS — G82.20 PARAPARESIS OF BOTH LOWER LIMBS (HCC): ICD-10-CM

## 2024-01-18 DIAGNOSIS — H81.90 VESTIBULAR DYSFUNCTION, UNSPECIFIED LATERALITY: ICD-10-CM

## 2024-01-18 DIAGNOSIS — R29.6 FREQUENT FALLS: Primary | ICD-10-CM

## 2024-01-18 DIAGNOSIS — M62.838 MUSCLE SPASMS OF BOTH LOWER EXTREMITIES: ICD-10-CM

## 2024-01-18 PROCEDURE — 97112 NEUROMUSCULAR REEDUCATION: CPT

## 2024-01-18 PROCEDURE — 97110 THERAPEUTIC EXERCISES: CPT

## 2024-01-18 NOTE — PROGRESS NOTES
"Daily Note     Today's date: 2024  Patient name: Elena Multani  : 1970  MRN: [id number]  Referring provider: Miriam Harris, *  Dx:   Encounter Diagnosis     ICD-10-CM    1. Frequent falls  R29.6       2. Muscle spasms of both lower extremities  M62.838       3. Paraparesis of both lower limbs (HCC)  G82.20       4. Vestibular dysfunction, unspecified laterality  H81.90                    Subjective: Pt denies adverse response to lv. She reports feeling \"wobbly\" like she is going to fall backward which began last night and continues into this morning.  Objective: See treatment diary below.    Assessment: Pt with good tolerance to treatment and program as listed below. More challenged with interventions on this date, continuing to report feeling that she is going to lose her balance backward. Able to complete all exercises as indicated below and demonstrates improved balance with repetition of exercises. Improved gait with head turns on this date with increased heel strike during gait. Appropriate challenge and fatigue without adverse response on over LOB. Will continue to gradually progress as tolerated. Pt would benefit from continued skilled PT to improve current deficits and maximize function.      Plan: Continue per plan of care.  Progress treatment as tolerated.         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    Chronic neck pain    Numbness and tingling in left arm    Real time reverse transcriptase PCR positive for COVID-19 virus    Dizziness and giddiness    Persistent postural-perceptual dizziness    Paraparesis of both lower limbs (HCC)    Cervical radiculopathy at " "C7    CNS demyelination (HCC)     Daily Treatment Diary      Assessment  12/15  12/19  12/26  12/29  1/2  1/5  1/9  1/16  1/18     Eval/Reval  MD  Vestibular IE                   FOTO         **         **   Manuals                                                     Prescribed POC    Pt Education  MD                      HEP Issued/Updated  MD Bermudez      L1x5'  L1, 5'  L1x5'  L1x5'  L1x5'  L1x5'  L1x5'      Bridges + Hip ADD    5\"   1x10 2x10x5\" 5\"  2x10 5\"  2x10 5\"  2x10 5\"  2x10 5\"  2x15     Bridges + TB Hip ABD    [place]   5\"   1x10 OTB 1x10x5\" OTB  5\"  3x5   Clamshells  5\"  2x10 ea Clamshells  5\"  2x10 ea Clamshells  OTB  5\"   1x10 ea Clamshells  OTB  5\"     Biodex Testing    See Results Above           Biodex LOS      Level 1   Trial 1: 45\"   51%   Trial 2: 38\"   44%   Trial 3: 38\"   61%  easy   64%  75%  69%  65%  No UEs  easy  81%  76%  86%  78%  No UEs  Level 1  Trial 1: 38\"   66%   Trial 2: 32\"   72%   Trial 3: 36\"   66%    level 1  Trial 1: 35\"  64%  Trial 2: 38\"  82%  Trial 3: 37\"  80%  Level 1  Trial 1: 39\" 78%  Trial 2: 35\"  83%  Trial 3: 35\"  84%    Level 2  45\"  79%    Level 1  Trial 1: 37\"  81%  Trial 2: 40\" 83%  Trial 3: 46\" 73%      Biodex - Maze            Level 1  Trial 1: 33\"   89%   Trial 2: 34\"   85%   Trial 3: 29\"   92%  Level 1  Trial 1:32\"  89%  Trial 2: 28\"  100%  Trial 3:31\"  100%  level 1  Trial 1: 28\"  100%  Trial 2: 47\"  98%  Level 2  Trial 1:  Level 1  Trial 1: 42\"  100%  Trial2: 40\"  100%  Trial 3: 36\"  96%      FTEO - Airex      10\"x 3   CS/CG  3x10\"  4x10\" 10\"x1  15\"x1  20\"x1  15\"x1  20\"x1  25\"x1  15\"x1  20\"x1  25\"x1 20\"x1  25\"x1  30\"x1      FAEC - Floor      10\"x 3   CS/CG  3x10\"  4x10\" 10\"x1  15\"x1  20\"x1  10\"x1  15\"x1  20\"x1  10\"x1  15\"x1  20\"x1  10\"x1  15\"x1  20\"x      FAEO - Head Turns L/R     5 turns ea    CS/CG 5x ea L/R up/down 10x ea L/R up/down Airex  10 nods  CS/CG Airex  10 nods  CS/CG Airex  10 nodsx2  CS/CG Airex  [id number]  CG/CS    FAEO - Head " "Nods Up/Down    Airex   5 nods ea   CS/CG Airex   5 nods ea   CS/CG Airex  10 nods ea CS/CG Airex  10 nods x2 CS/CG Airex  10 nods x2  CS/CG Airex  10 nodsx2  CS/CG Airex  [id number]  CG/CS     Tandem Balance - Modified      10\" x 2 ea  2x10\" ea  3x10\" ea  D/C           Tandem Balance      10\"x 2 ea  15\"x 2 ea 15\"x3 ea 15\"x3 ea 15x3 ea     Tandem Walking                        Sidestepping                2 laps @ counter  3 laps @ railing      Step Up & Over                        Gait with Head Turns     50 ft x2  CG Up/Down  Side/Side  @ Railing  4 laps ea  CS Up/down  Side/side  @ railing  4 laps ea   CS Up/down  Side/side  @ counter  3 lapts ea  CS Up/down  Side/side  @ railing  4 laps ea  CS     Gaith with Head Nods              Obstacle Course                       Modalities                                             "

## 2024-01-19 ENCOUNTER — APPOINTMENT (OUTPATIENT)
Dept: PHYSICAL THERAPY | Facility: REHABILITATION | Age: 54
End: 2024-01-19
Payer: COMMERCIAL

## 2024-01-23 ENCOUNTER — OFFICE VISIT (OUTPATIENT)
Dept: PHYSICAL THERAPY | Facility: REHABILITATION | Age: 54
End: 2024-01-23
Payer: COMMERCIAL

## 2024-01-23 DIAGNOSIS — G82.20 PARAPARESIS OF BOTH LOWER LIMBS (HCC): ICD-10-CM

## 2024-01-23 DIAGNOSIS — R29.6 FREQUENT FALLS: Primary | ICD-10-CM

## 2024-01-23 DIAGNOSIS — H81.90 VESTIBULAR DYSFUNCTION, UNSPECIFIED LATERALITY: ICD-10-CM

## 2024-01-23 DIAGNOSIS — M62.838 MUSCLE SPASMS OF BOTH LOWER EXTREMITIES: ICD-10-CM

## 2024-01-23 PROCEDURE — 97112 NEUROMUSCULAR REEDUCATION: CPT

## 2024-01-23 PROCEDURE — 97110 THERAPEUTIC EXERCISES: CPT

## 2024-01-23 NOTE — PROGRESS NOTES
Daily Note     Today's date: 2024  Patient name: Elena Multani  : 1970  MRN: [id number]  Referring provider: Miriam Harris, *  Dx:   Encounter Diagnosis     ICD-10-CM    1. Frequent falls  R29.6       2. Muscle spasms of both lower extremities  M62.838       3. Paraparesis of both lower limbs (HCC)  G82.20       4. Vestibular dysfunction, unspecified laterality  H81.90                      Subjective:  Patient reports that she is doing well.  She denies having any recent falls.       Objective: See treatment diary below      Assessment: Patient tolerated treatment well.  Patient performed ex and balance activity as noted with direct supervision throughout the session.  Patient performed all activity without loss of balance requiring PTA correction.  Patient is focused and motivated to progress with her balance and strength.  Patient would benefit from continued PT intervention to address deficits and attain set goals.       Plan: Continue per plan of care.      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    Chronic neck pain    Numbness and tingling in left arm    Real time reverse transcriptase PCR positive for COVID-19 virus    Dizziness and giddiness    Persistent postural-perceptual dizziness    Paraparesis of both lower limbs (HCC)    Cervical radiculopathy at C7    CNS demyelination (HCC)     Daily Treatment Diary      Assessment      Eval/Reval   Vestibular IE                   FOTO         **         **   Manuals                                                     Prescribed POC    Pt Education                         "HEP Issued/Updated                        Bike      L1x5'  L1, 5'  L1x5'  L1x5'  L1x5'  L1x5'  L1x5'  L1x5'    Bridges + Hip ADD    5\"   1x10 2x10x5\" 5\"  2x10 5\"  2x10 5\"  2x10 5\"  2x10 5\"  2x15 5\"  2x15    Bridges + TB Hip ABD    [place]   5\"   1x10 OTB 1x10x5\" OTB  5\"  3x5   Clamshells  5\"  2x10 ea Clamshells  5\"  2x10 ea Clamshells  OTB  5\"   1x10 ea Clamshells  OTB  5\" Clamshells OTB  5\"x15    Biodex Testing    See Results Above           Biodex LOS      Level 1   Trial 1: 45\"   51%   Trial 2: 38\"   44%   Trial 3: 38\"   61%  easy   64%  75%  69%  65%  No UEs  easy  81%  76%  86%  78%  No UEs  Level 1  Trial 1: 38\"   66%   Trial 2: 32\"   72%   Trial 3: 36\"   66%    level 1  Trial 1: 35\"  64%  Trial 2: 38\"  82%  Trial 3: 37\"  80%  Level 1  Trial 1: 39\" 78%  Trial 2: 35\"  83%  Trial 3: 35\"  84%    Level 2  45\"  79%    Level 1  Trial 1: 37\"  81%  Trial 2: 40\" 83%  Trial 3: 46\" 73%  Level 1  Trial : 38\"  81%    Trial 2: 41\"  79%    Trial 3: 40\"  88%    Biodex - Maze            Level 1  Trial 1: 33\"   89%   Trial 2: 34\"   85%   Trial 3: 29\"   92%  Level 1  Trial 1:32\"  89%  Trial 2: 28\"  100%  Trial 3:31\"  100%  level 1  Trial 1: 28\"  100%  Trial 2: 47\"  98%  Level 2  Trial 1:  Level 1  Trial 1: 42\"  100%  Trial2: 40\"  100%  Trial 3: 36\"  96%  Level 1  Trial  35\"  96%  Trial 2:  32\" 85%  Trial 3: 37\"  96%    FTEO - Airex      10\"x 3   CS/CG  3x10\"  4x10\" 10\"x1  15\"x1  20\"x1  15\"x1  20\"x1  25\"x1  15\"x1  20\"x1  25\"x1 20\"x1  25\"x1  30\"x1  20\"x1  25\"x1  30\"x1    FAEC - Floor      10\"x 3   CS/CG  3x10\"  4x10\" 10\"x1  15\"x1  20\"x1  10\"x1  15\"x1  20\"x1  10\"x1  15\"x1  20\"x1  10\"x1  15\"x1  20\"x  10\"x1  20\"x1  25\"x1    FAEO - Head Turns L/R     5 turns ea    CS/CG 5x ea L/R up/down 10x ea L/R up/down Airex  10 nods  CS/CG Airex  10 nods  CS/CG Airex  10 nodsx2  CS/CG Airex  [id number]  CG/CS Airex  Head turns x20  CG/CS   FAEO - Head Nods Up/Down    Airex   5 nods ea   CS/CG Airex   5 nods ea   CS/CG Airex  10 nods ea CS/CG " "Airex  10 nods x2 CS/CG Airex  10 nods x2  CS/CG Airex  10 nodsx2  CS/CG Airex  [id number]  CG/CS Airex  Nods  X20 CG/CS    Tandem Balance - Modified      10\" x 2 ea  2x10\" ea  3x10\" ea  D/C           Tandem Balance      10\"x 2 ea  15\"x 2 ea 15\"x3 ea 15\"x3 ea 15x3 ea 15\"x3 ea    Tandem Walking                        Sidestepping                2 laps @ counter  3 laps @ railing  3 laps @ rail    Step Up & Over                        Gait with Head Turns     50 ft x2  CG Up/Down  Side/Side  @ [place]  4 laps ea  CS Up/down  Side/side  @ railing  4 laps ea   CS Up/down  Side/side  @ counter  3 lapts ea  CS Up/down  Side/side  @ railing  4 laps ea  CS Upo/down  Side/side  @ rail x4 laps ea    Gaith with Head Nods              Obstacle Course                       Modalities                                               "

## 2024-01-25 ENCOUNTER — APPOINTMENT (OUTPATIENT)
Dept: PHYSICAL THERAPY | Facility: REHABILITATION | Age: 54
End: 2024-01-25
Payer: COMMERCIAL

## 2024-01-30 ENCOUNTER — OFFICE VISIT (OUTPATIENT)
Dept: PHYSICAL THERAPY | Facility: REHABILITATION | Age: 54
End: 2024-01-30
Payer: COMMERCIAL

## 2024-01-30 DIAGNOSIS — R29.6 FREQUENT FALLS: Primary | ICD-10-CM

## 2024-01-30 DIAGNOSIS — M62.838 MUSCLE SPASMS OF BOTH LOWER EXTREMITIES: ICD-10-CM

## 2024-01-30 DIAGNOSIS — G82.20 PARAPARESIS OF BOTH LOWER LIMBS (HCC): ICD-10-CM

## 2024-01-30 PROCEDURE — 97110 THERAPEUTIC EXERCISES: CPT

## 2024-01-30 PROCEDURE — 97112 NEUROMUSCULAR REEDUCATION: CPT

## 2024-01-30 PROCEDURE — 97530 THERAPEUTIC ACTIVITIES: CPT

## 2024-01-30 NOTE — PROGRESS NOTES
Daily Note     Today's date: 2024  Patient name: Elena Multani  : 1970  MRN: [id number]  Referring provider: Miriam Harris, *  Dx:   Encounter Diagnosis     ICD-10-CM    1. Frequent falls  R29.6       2. Muscle spasms of both lower extremities  M62.838       3. Paraparesis of both lower limbs (HCC)  G82.20                      Subjective: pt reports her neck and L knee are hurting today due to not taking her autoimmune medications. She noted her balance and strength has improved since beginning therapy.      Objective: See treatment diary below      Assessment: Tolerated treatment well. Intermittent LOB with dynamic balance activities, but exhibits good hip/ankle strategy to regain balance. Fatigued post exercises. Patient exhibited good technique with therapeutic exercises and would benefit from continued PT.      Plan: Continue per plan of care.  Progress treatment as tolerated.       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    Chronic neck pain    Numbness and tingling in left arm    Real time reverse transcriptase PCR positive for COVID-19 virus    Dizziness and giddiness    Persistent postural-perceptual dizziness    Paraparesis of both lower limbs (HCC)    Cervical radiculopathy at C7    CNS demyelination (HCC)     Daily Treatment Diary      Assessment    Eval/Reval                     FOTO         **         **   Manuals                                                     Prescribed POC    Pt Education                        HEP Issued/Updated                        Bike  L1x5'    L1x5'  L1, 5'  L1x5'  L1x5'  L1x5'   "L1x5'  L1x5'  L1x5'    Bridges + Hip ADD 5\"  2x15   5\"   1x10 2x10x5\" 5\"  2x10 5\"  2x10 5\"  2x10 5\"  2x10 5\"  2x15 5\"  2x15    Bridges + TB Hip ABD Clamshells OTB  5\"x15   [place]   5\"   1x10 OTB 1x10x5\" OTB  5\"  3x5   Clamshells  5\"  2x10 ea Clamshells  5\"  2x10 ea Clamshells  OTB  5\"   1x10 ea Clamshells  OTB  5\" Clamshells OTB  5\"x15    Biodex Testing    See Results Above           Biodex LOS  Level 2  Trial:   46\", 71%    Trial 2: 50\", 78%    Trial 3:  51\", 81%     Level 1   Trial 1: 45\"   51%   Trial 2: 38\"   44%   Trial 3: 38\"   61%  easy   64%  75%  69%  65%  No UEs  easy  81%  76%  86%  78%  No UEs  Level 1  Trial 1: 38\"   66%   Trial 2: 32\"   72%   Trial 3: 36\"   66%    level 1  Trial 1: 35\"  64%  Trial 2: 38\"  82%  Trial 3: 37\"  80%  Level 1  Trial 1: 39\" 78%  Trial 2: 35\"  83%  Trial 3: 35\"  84%    Level 2  45\"  79%    Level 1  Trial 1: 37\"  81%  Trial 2: 40\" 83%  Trial 3: 46\" 73%  Level 1  Trial : 38\"  81%    Trial 2: 41\"  79%    Trial 3: 40\"  88%    Biodex - Maze  Level 1  Trial 1: 42\", 92%  Trial 2:  41\", 96%  Trial 3:  36%,100%          Level 1  Trial 1: 33\"   89%   Trial 2: 34\"   85%   Trial 3: 29\"   92%  Level 1  Trial 1:32\"  89%  Trial 2: 28\"  100%  Trial 3:31\"  100%  level 1  Trial 1: 28\"  100%  Trial 2: 47\"  98%  Level 2  Trial 1:  Level 1  Trial 1: 42\"  100%  Trial2: 40\"  100%  Trial 3: 36\"  96%  Level 1  Trial  35\"  96%  Trial 2:  32\" 85%  Trial 3: 37\"  96%    FTEO - Airex  30\"x2    10\"x 3   CS/CG  3x10\"  4x10\" 10\"x1  15\"x1  20\"x1  15\"x1  20\"x1  25\"x1  15\"x1  20\"x1  25\"x1 20\"x1  25\"x1  30\"x1  20\"x1  25\"x1  30\"x1    FAEC - Floor  30\"x2    10\"x 3   CS/CG  3x10\"  4x10\" 10\"x1  15\"x1  20\"x1  10\"x1  15\"x1  20\"x1  10\"x1  15\"x1  20\"x1  10\"x1  15\"x1  20\"x  10\"x1  20\"x1  25\"x1    FAEO - Head Turns L/R Airex  Head turns x20  CG/CS    5 turns ea    CS/CG 5x ea L/R up/down 10x ea L/R up/down Airex  10 nods  CS/CG Airex  10 nods  CS/CG Airex  10 nodsx2  CS/CG Airex  [id number]  CG/CS Airex  Head turns " "x20  CG/CS   FAEO - Head Nods Up/Down Airex  Nods  X20 CG/CS   Airex   5 nods ea   CS/CG Airex   5 nods ea   CS/CG Airex  10 nods ea CS/CG Airex  10 nods x2 CS/CG Airex  10 nods x2  CS/CG Airex  10 nodsx2  CS/CG Airex  [id number]  CG/CS Airex  Nods  X20 CG/CS    Tandem Balance - Modified      10\" x 2 ea  2x10\" ea  3x10\" ea  D/C           Tandem Balance 15\"x3 ea     10\"x 2 ea  15\"x 2 ea 15\"x3 ea 15\"x3 ea 15x3 ea 15\"x3 ea    Tandem Walking                        Sidestepping  3 laps @ rail              2 laps @ counter  3 laps @ railing  3 laps @ rail    Step Up & Over                        Gait with Head Turns Up/down  Side/side  @ rail x4 laps ea    50 ft x2  CG Up/Down  Side/Side  @ [place]  4 laps ea  CS Up/down  Side/side  @ railing  4 laps ea   CS Up/down  Side/side  @ counter  3 lapts ea  CS Up/down  Side/side  @ railing  4 laps ea  CS Up/down  Side/side  @ rail x4 laps ea    Gaith with Head Nods              Obstacle Course                       Modalities                                                 "

## 2024-02-01 ENCOUNTER — OFFICE VISIT (OUTPATIENT)
Dept: PHYSICAL THERAPY | Facility: REHABILITATION | Age: 54
End: 2024-02-01
Payer: COMMERCIAL

## 2024-02-01 DIAGNOSIS — M62.838 MUSCLE SPASMS OF BOTH LOWER EXTREMITIES: ICD-10-CM

## 2024-02-01 DIAGNOSIS — R29.6 FREQUENT FALLS: Primary | ICD-10-CM

## 2024-02-01 DIAGNOSIS — H81.90 VESTIBULAR DYSFUNCTION, UNSPECIFIED LATERALITY: ICD-10-CM

## 2024-02-01 DIAGNOSIS — G82.20 PARAPARESIS OF BOTH LOWER LIMBS (HCC): ICD-10-CM

## 2024-02-01 PROCEDURE — 97112 NEUROMUSCULAR REEDUCATION: CPT

## 2024-02-01 PROCEDURE — 97110 THERAPEUTIC EXERCISES: CPT

## 2024-02-01 NOTE — PROGRESS NOTES
Daily Note     Today's date: 2024  Patient name: Elena Multani  : 1970  MRN: [id number]  Referring provider: Miriam Harris, *  Dx:   Encounter Diagnosis     ICD-10-CM    1. Frequent falls  R29.6       2. Muscle spasms of both lower extremities  M62.838       3. Paraparesis of both lower limbs (HCC)  G82.20       4. Vestibular dysfunction, unspecified laterality  H81.90                      Subjective: pt reports she has been in more pain since she has not been taking her autoimmune medications, but she will be taking them again starting tomorrow.    Objective: See treatment diary below      Assessment: Pt with good tolerance to treatment. Added step up and overs with good tolerance, mild HHA required for balance, pt noting her knee/ankle feeling mostly stiff, no issues noted with LOB. Pt continues to exhibit moderate challenge with tandem balance, Biodex. Improvement in sidestepping with L LE placement. Pt would benefit from continued skilled PT to improve current deficits and maximize function.    Plan: Continue per plan of care.  Progress treatment as tolerated.       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    Chronic neck pain    Numbness and tingling in left arm    Real time reverse transcriptase PCR positive for COVID-19 virus    Dizziness and giddiness    Persistent postural-perceptual dizziness    Paraparesis of both lower limbs (HCC)    Cervical radiculopathy at C7    CNS demyelination (HCC)     Daily Treatment Diary      Assessment    Eval/Reval                     FOTO         **         **   Manuals              "                                        Prescribed POC    Pt Education                        HEP Issued/Updated                        Bike  L1x5'  L1x5'  L1x5'  L1, 5'  L1x5'  L1x5'  L1x5'  L1x5'  L1x5'  L1x5'    Bridges + Hip ADD 5\"  2x15 5\"  2x15  5\"   1x10 2x10x5\" 5\"  2x10 5\"  2x10 5\"  2x10 5\"  2x10 5\"  2x15 5\"  2x15    Bridges + TB Hip ABD Clamshells OTB  5\"x15   Orange   5\"   1x10 OTB 1x10x5\" OTB  5\"  3x5   Clamshells  5\"  2x10 ea Clamshells  5\"  2x10 ea Clamshells  OTB  5\"   1x10 ea Clamshells  OTB  5\" Clamshells OTB  5\"x15    Biodex Testing    See Results Above           Biodex LOS  Level 2  Trial:   46\", 71%    Trial 2: 50\", 78%    Trial 3:  51\", 81%   level 2:  Triall 1:  50\" 69%    Trial 2:  45\" 81%    Trial 3:  41\" 76%      Level 1   Trial 1: 45\"   51%   Trial 2: 38\"   44%   Trial 3: 38\"   61%  easy   64%  75%  69%  65%  No UEs  easy  81%  76%  86%  78%  No UEs  Level 1  Trial 1: 38\"   66%   Trial 2: 32\"   72%   Trial 3: 36\"   66%    level 1  Trial 1: 35\"  64%  Trial 2: 38\"  82%  Trial 3: 37\"  80%  Level 1  Trial 1: 39\" 78%  Trial 2: 35\"  83%  Trial 3: 35\"  84%    Level 2  45\"  79%    Level 1  Trial 1: 37\"  81%  Trial 2: 40\" 83%  Trial 3: 46\" 73%  Level 1  Trial : 38\"  81%    Trial 2: 41\"  79%    Trial 3: 40\"  88%    Biodex - Maze  Level 1  Trial 1: 42\", 92%  Trial 2:  41\", 96%  Trial 3:  36%,100%  Level 1:  Trial 1:  29\" 96%  Trial 2:  33\" 96%  Trial 3:  38\" 100%        Level 1  Trial 1: 33\"   89%   Trial 2: 34\"   85%   Trial 3: 29\"   92%  Level 1  Trial 1:32\"  89%  Trial 2: 28\"  100%  Trial 3:31\"  100%  level 1  Trial 1: 28\"  100%  Trial 2: 47\"  98%  Level 2  Trial 1:  Level 1  Trial 1: 42\"  100%  Trial2: 40\"  100%  Trial 3: 36\"  96%  Level 1  Trial  35\"  96%  Trial 2:  32\" 85%  Trial 3: 37\"  96%    FTEO - Airex  30\"x2  30\"x2  10\"x 3   CS/CG  3x10\"  4x10\" 10\"x1  15\"x1  20\"x1  15\"x1  20\"x1  25\"x1  15\"x1  20\"x1  25\"x1 20\"x1  25\"x1  30\"x1  20\"x1  25\"x1  30\"x1    FAEC - Floor  30\"x2  30\"x2  10\"x 3   CS/CG " " 3x10\"  4x10\" 10\"x1  15\"x1  20\"x1  10\"x1  15\"x1  20\"x1  10\"x1  15\"x1  20\"x1  10\"x1  15\"x1  20\"x  10\"x1  20\"x1  25\"x1    FAEO - Head Turns L/R Airex  Head turns x20  CG/CS Airex  Head turns x20  CG/CS   5 turns ea    CS/CG 5x ea L/R up/down 10x ea L/R up/down Airex  10 nods  CS/CG Airex  10 nods  CS/CG Airex  10 nodsx2  CS/CG Airex  [id number]  CG/CS Airex  Head turns x20  CG/CS   FAEO - Head Nods Up/Down Airex  Nods  X20 CG/CS Airex  Nods  X20   CG/CS  Airex   5 nods ea   CS/CG Airex   5 nods ea   CS/CG Airex  10 nods ea CS/CG Airex  10 nods x2 CS/CG Airex  10 nods x2  CS/CG Airex  10 nodsx2  CS/CG Airex  [id number]  CG/CS Airex  Nods  X20 CG/CS    Tandem Balance - Modified      10\" x 2 ea  2x10\" ea  3x10\" ea  D/C           Tandem Balance 15\"x3 ea 15\"x3 ea    10\"x 2 ea  15\"x 2 ea 15\"x3 ea 15\"x3 ea 15x3 ea 15\"x3 ea    Tandem Walking                        Sidestepping  3 laps @ rail  4 laps @ rail            2 laps @ counter  3 laps @ railing  3 laps @ rail    Step Up & Over    0R  X6 ea                    Gait with Head Turns Up/down  Side/side  @ rail x4 laps ea Up/down  Side/side  @ rail x4 laps ea   50 ft x2  CG Up/Down  Side/Side  @ Railing  4 laps ea  CS Up/down  Side/side  @ railing  4 laps ea   CS Up/down  Side/side  @ counter  3 lapts ea  CS Up/down  Side/side  @ railing  4 laps ea  CS Up/down  Side/side  @ rail x4 laps ea    Gaith with Head Nods              Obstacle Course                       Modalities                                                 "

## 2024-02-06 ENCOUNTER — OFFICE VISIT (OUTPATIENT)
Dept: PHYSICAL THERAPY | Facility: REHABILITATION | Age: 54
End: 2024-02-06
Payer: COMMERCIAL

## 2024-02-06 DIAGNOSIS — H81.90 VESTIBULAR DYSFUNCTION, UNSPECIFIED LATERALITY: ICD-10-CM

## 2024-02-06 DIAGNOSIS — G82.20 PARAPARESIS OF BOTH LOWER LIMBS (HCC): ICD-10-CM

## 2024-02-06 DIAGNOSIS — M62.838 MUSCLE SPASMS OF BOTH LOWER EXTREMITIES: ICD-10-CM

## 2024-02-06 DIAGNOSIS — R29.6 FREQUENT FALLS: Primary | ICD-10-CM

## 2024-02-06 PROCEDURE — 97110 THERAPEUTIC EXERCISES: CPT

## 2024-02-06 PROCEDURE — 97112 NEUROMUSCULAR REEDUCATION: CPT

## 2024-02-06 NOTE — PROGRESS NOTES
"Daily Note     Today's date: 2024  Patient name: Elena Multani  : 1970  MRN: [id number]  Referring provider: Miriam Harris, *  Dx:   Encounter Diagnosis     ICD-10-CM    1. Frequent falls  R29.6       2. Muscle spasms of both lower extremities  M62.838       3. Paraparesis of both lower limbs (HCC)  G82.20       4. Vestibular dysfunction, unspecified laterality  H81.90           Start Time: 818          Subjective: Pt reports she attempted steps at home over the weekend out of curiosity and still feels that she loses her foot placement on the last couple of steps and it \"trips her up\". She does state that she feels her balance has improved overall since beginning PT and is pleased with her progress.      Objective: See treatment diary below      Assessment: Tolerated treatment well. Pt demonstrates improved static and dynamic balance with decreased CG, no assist from PTA or overt LOB. Program was progressed as listed below on this date with appropriate challenge and fatigue noted. Pt denies adverse response post treatment. Will continue to progress as able. Pt would benefit from continued skilled PT to improve current deficits and maximize function.      Plan: Continue per plan of care.      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    Chronic neck pain    Numbness and tingling in left arm    Real time reverse transcriptase PCR positive for COVID-19 virus    Dizziness and giddiness    Persistent postural-perceptual dizziness    Paraparesis of both lower limbs (HCC)    Cervical radiculopathy at C7    CNS demyelination (HCC)     Daily Treatment Diary      Assessment   " "2/6 12/29 1/2 1/5 1/9 1/16 1/18 1/23   Eval/Reval                     FOTO         **         **   Manuals                                                     Prescribed POC    Pt Education                        HEP Issued/Updated                        Bike  L1x5'  L1x5'  L1x5'  L1, 5'  L1x5'  L1x5'  L1x5'  L1x5'  L1x5'  L1x5'    Bridges + Hip ADD 5\"  2x15 5\"  2x15 5\"  2x15 2x10x5\" 5\"  2x10 5\"  2x10 5\"  2x10 5\"  2x10 5\"  2x15 5\"  2x15    Bridges + TB Hip ABD Clamshells OTB  5\"x15  Clamshells  OTB  5\" 2x10 OTB 1x10x5\" OTB  5\"  3x5   Clamshells  5\"  2x10 ea Clamshells  5\"  2x10 ea Clamshells  OTB  5\"   1x10 ea Clamshells  OTB  5\" Clamshells OTB  5\"x15    Biodex Testing              Biodex LOS  Level 2  Trial:   46\", 71%    Trial 2: 50\", 78%    Trial 3:  51\", 81%   level 2:  Triall 1:  50\" 69%    Trial 2:  45\" 81%    Trial 3:  41\" 76%     Level 2  Trial 1:  48\", 85%    Trial 2:  45\", 85%    Trial 3:  47\", 79%    easy   64%  75%  69%  65%  No UEs  easy  81%  76%  86%  78%  No UEs  Level 1  Trial 1: 38\"   66%   Trial 2: 32\"   72%   Trial 3: 36\"   66%    level 1  Trial 1: 35\"  64%  Trial 2: 38\"  82%  Trial 3: 37\"  80%  Level 1  Trial 1: 39\" 78%  Trial 2: 35\"  83%  Trial 3: 35\"  84%    Level 2  45\"  79%    Level 1  Trial 1: 37\"  81%  Trial 2: 40\" 83%  Trial 3: 46\" 73%  Level 1  Trial : 38\"  81%    Trial 2: 41\"  79%    Trial 3: 40\"  88%    Biodex - Maze  Level 1  Trial 1: 42\", 92%  Trial 2:  41\", 96%  Trial 3:  36%,100%  Level 1:  Trial 1:  29\" 96%  Trial 2:  33\" 96%  Trial 3:  38\" 100% Level 1:  Trial1:  28\", 100%  Trial 2:  32\", 92%  Trial 3:  28\", 100%          Level 1  Trial 1: 33\"   89%   Trial 2: 34\"   85%   Trial 3: 29\"   92%  Level 1  Trial 1:32\"  89%  Trial 2: 28\"  100%  Trial 3:31\"  100%  level 1  Trial 1: 28\"  100%  Trial 2: 47\"  98%  Level 2  Trial 1:  Level 1  Trial 1: 42\"  100%  Trial2: 40\"  100%  Trial 3: 36\"  96%  Level 1  Trial  35\"  96%  Trial 2:  32\" 85%  Trial 3: 37\"  96%    FTEO - Airex  30\"x2  " "30\"x2 30\"x2  3x10\"  4x10\" 10\"x1  15\"x1  20\"x1  15\"x1  20\"x1  25\"x1  15\"x1  20\"x1  25\"x1 20\"x1  25\"x1  30\"x1  20\"x1  25\"x1  30\"x1    FAEC - Floor  30\"x2  30\"x2 30\"x2  3x10\"  4x10\" 10\"x1  15\"x1  20\"x1  10\"x1  15\"x1  20\"x1  10\"x1  15\"x1  20\"x1  10\"x1  15\"x1  20\"x  10\"x1  20\"x1  25\"x1    FAEO - Head Turns L/R Airex  Head turns x20  CG/CS Airex  Head turns x20  CG/CS Airex head turns x20 CS 5x ea L/R up/down 10x ea L/R up/down Airex  10 nods  CS/CG Airex  10 nods  CS/CG Airex  10 nodsx2  CS/CG Airex  [id number]  CG/CS Airex  Head turns x20  CG/CS   FAEO - Head Nods Up/Down Airex  Nods  X20 CG/CS Airex  Nods  X20   CG/CS Airex nods  x20   CS Airex   5 nods ea   CS/CG Airex  10 nods ea CS/CG Airex  10 nods x2 CS/CG Airex  10 nods x2  CS/CG Airex  10 nodsx2  CS/CG Airex  [id number]  CG/CS Airex  Nods  X20 CG/CS    Tandem Balance - Modified       2x10\" ea  3x10\" ea  D/C           Tandem Balance 15\"x3 ea 15\"x3 ea 20\"x2 ea   10\"x 2 ea  15\"x 2 ea 15\"x3 ea 15\"x3 ea 15x3 ea 15\"x3 ea    Tandem Walking                        Sidestepping  3 laps @ rail  4 laps @ rail  4 laps @ rail          2 laps @ counter  3 laps @ railing  3 laps @ rail    Step Up & Over    0R  X6 ea  0R  X8 ea                  Gait with Head Turns Up/down  Side/side  @ rail x4 laps ea Up/down  Side/side  @ rail x4 laps ea Up/down side/side @ rail x4 laps ea  50 ft x2  CG Up/Down  Side/Side  @ Railing  4 laps ea  CS Up/down  Side/side  @ railing  4 laps ea   CS Up/down  Side/side  @ counter  3 lapts ea  CS Up/down  Side/side  @ railing  4 laps ea  CS Up/down  Side/side  @ rail x4 laps ea    Gaith with Head Nods              Obstacle Course                       Modalities                                                   "

## 2024-02-08 ENCOUNTER — OFFICE VISIT (OUTPATIENT)
Dept: PHYSICAL THERAPY | Facility: REHABILITATION | Age: 54
End: 2024-02-08
Payer: COMMERCIAL

## 2024-02-08 DIAGNOSIS — M62.838 MUSCLE SPASMS OF BOTH LOWER EXTREMITIES: ICD-10-CM

## 2024-02-08 DIAGNOSIS — R29.6 FREQUENT FALLS: Primary | ICD-10-CM

## 2024-02-08 DIAGNOSIS — G82.20 PARAPARESIS OF BOTH LOWER LIMBS (HCC): ICD-10-CM

## 2024-02-08 DIAGNOSIS — H81.90 VESTIBULAR DYSFUNCTION, UNSPECIFIED LATERALITY: ICD-10-CM

## 2024-02-08 PROCEDURE — 97112 NEUROMUSCULAR REEDUCATION: CPT

## 2024-02-08 PROCEDURE — 97110 THERAPEUTIC EXERCISES: CPT

## 2024-02-08 PROCEDURE — 97530 THERAPEUTIC ACTIVITIES: CPT

## 2024-02-08 NOTE — PROGRESS NOTES
Daily Note     Today's date: 2024  Patient name: Elena Multani  : 1970  MRN: [id number]  Referring provider: Miriam Harris, *  Dx:   Encounter Diagnosis     ICD-10-CM    1. Frequent falls  R29.6       2. Muscle spasms of both lower extremities  M62.838       3. Paraparesis of both lower limbs (HCC)  G82.20       4. Vestibular dysfunction, unspecified laterality  H81.90                      Subjective: pt reports walking around the block two days ago in which she experienced a significant amount of discomfort in lower back into R posterior hip. She noted soreness in B calves as well. She currently reported discomfort has remained persistent, but has not worsened.      Objective: See treatment diary below      Assessment: Pt tolerated treatment fairly well. Modified treatment this visit; resume balance activities NV if able. Added hamstring and gastroc stretches with positive relief. Educated patient on DOMS and emphasized to continue current activity level(to tolerance) to minimize level of discomfort. Patient demonstrated fatigue post treatment, exhibited good technique with therapeutic exercises, and would benefit from continued PT      Plan: Continue per plan of care.  Progress treatment as tolerated.       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    Chronic neck pain    Numbness and tingling in left arm    Real time reverse transcriptase PCR positive for COVID-19 virus    Dizziness and giddiness    Persistent postural-perceptual dizziness    Paraparesis of both lower limbs (HCC)    Cervical radiculopathy at C7    CNS demyelination (HCC)     Daily Treatment Diary     "  Assessment 1/30 2/1 2/6 2/8 1/5 1/9 1/16 1/18 1/23   Eval/Reval                     FOTO                  **   Manuals                                                     Prescribed POC    Pt Education                        HEP Issued/Updated                        Bike  L1x5'  L1x5'  L1x5'  L0x5'   L1x5'  L1x5'  L1x5'  L1x5'  L1x5'   Hams/gastroc strap stretch    15\"x3 ea B          Bridges + Hip ADD 5\"  2x15 5\"  2x15 5\"  2x15 5\"1x10  5\"  2x10 5\"  2x10 5\"  2x10 5\"  2x15 5\"  2x15    Bridges + TB Hip ABD Clamshells OTB  5\"x15  Clamshells  OTB  5\" 2x10 ClamshellsOTB   R 5\"2x10  L 5\" 1x10  Clamshells  5\"  2x10 ea Clamshells  5\"  2x10 ea Clamshells  OTB  5\"   1x10 ea Clamshells  OTB  5\" Clamshells OTB  5\"x15    Biodex Testing              Biodex LOS  Level 2  Trial:   46\", 71%    Trial 2: 50\", 78%    Trial 3:  51\", 81%   level 2:  Triall 1:  50\" 69%    Trial 2:  45\" 81%    Trial 3:  41\" 76%     Level 2  Trial 1:  48\", 85%    Trial 2:  45\", 85%    Trial 3:  47\", 79%   NV   Level 1  Trial 1: 38\"   66%   Trial 2: 32\"   72%   Trial 3: 36\"   66%    level 1  Trial 1: 35\"  64%  Trial 2: 38\"  82%  Trial 3: 37\"  80%  Level 1  Trial 1: 39\" 78%  Trial 2: 35\"  83%  Trial 3: 35\"  84%    Level 2  45\"  79%    Level 1  Trial 1: 37\"  81%  Trial 2: 40\" 83%  Trial 3: 46\" 73%  Level 1  Trial : 38\"  81%    Trial 2: 41\"  79%    Trial 3: 40\"  88%    Biodex - Maze  Level 1  Trial 1: 42\", 92%  Trial 2:  41\", 96%  Trial 3:  36%,100%  Level 1:  Trial 1:  29\" 96%  Trial 2:  33\" 96%  Trial 3:  38\" 100% Level 1:  Trial1:  28\", 100%  Trial 2:  32\", 92%  Trial 3:  28\", 100%     NV   Level 1  Trial 1: 33\"   89%   Trial 2: 34\"   85%   Trial 3: 29\"   92%  Level 1  Trial 1:32\"  89%  Trial 2: 28\"  100%  Trial 3:31\"  100%  level 1  Trial 1: 28\"  100%  Trial 2: 47\"  98%  Level 2  Trial 1:  Level 1  Trial 1: 42\"  100%  Trial2: 40\"  100%  Trial 3: 36\"  96%  Level 1  Trial  35\"  96%  Trial 2:  32\" 85%  Trial 3: 37\"  96%    FTEO - Airex  30\"x2  30\"x2 " "30\"x2   10\"x1  15\"x1  20\"x1  15\"x1  20\"x1  25\"x1  15\"x1  20\"x1  25\"x1 20\"x1  25\"x1  30\"x1  20\"x1  25\"x1  30\"x1    FAEC - Floor  30\"x2  30\"x2 30\"x2 NV  10\"x1  15\"x1  20\"x1  10\"x1  15\"x1  20\"x1  10\"x1  15\"x1  20\"x1  10\"x1  15\"x1  20\"x  10\"x1  20\"x1  25\"x1    FAEO - Head Turns L/R Airex  Head turns x20  CG/CS Airex  Head turns x20  CG/CS Airex head turns x20 CS NV  Airex  10 nods  CS/CG Airex  10 nods  CS/CG Airex  10 nodsx2  CS/CG Airex  [id number]  CG/CS Airex  Head turns x20  CG/CS   FAEO - Head Nods Up/Down Airex  Nods  X20 CG/CS Airex  Nods  X20   CG/CS Airex nods  x20   CS NV  Airex  10 nods x2 CS/CG Airex  10 nods x2  CS/CG Airex  10 nodsx2  CS/CG Airex  [id number]  CG/CS Airex  Nods  X20 CG/CS    Tandem Balance - Modified         D/C           Tandem Balance 15\"x3 ea 15\"x3 ea 20\"x2 ea NV  10\"x 2 ea  15\"x 2 ea 15\"x3 ea 15\"x3 ea 15x3 ea 15\"x3 ea    Tandem Walking                        Sidestepping  3 laps @ rail  4 laps @ rail  4 laps @ rail  NV        2 laps @ counter  3 laps @ railing  3 laps @ rail    Step Up & Over    0R  X6 ea  0R  X8 ea  NV                Gait with Head Turns Up/down  Side/side  @ rail x4 laps ea Up/down  Side/side  @ rail x4 laps ea Up/down side/side @ rail x4 laps ea NV  Up/Down  Side/Side  @ Railing  4 laps ea  CS Up/down  Side/side  @ railing  4 laps ea   CS Up/down  Side/side  @ counter  3 lapts ea  CS Up/down  Side/side  @ railing  4 laps ea  CS Up/down  Side/side  @ rail x4 laps ea    Gaith with Head Nods              Obstacle Course                       Modalities                                                     "

## 2024-02-12 ENCOUNTER — EVALUATION (OUTPATIENT)
Dept: PHYSICAL THERAPY | Facility: REHABILITATION | Age: 54
End: 2024-02-12
Payer: COMMERCIAL

## 2024-02-12 DIAGNOSIS — R29.6 FREQUENT FALLS: Primary | ICD-10-CM

## 2024-02-12 DIAGNOSIS — G82.20 PARAPARESIS OF BOTH LOWER LIMBS (HCC): ICD-10-CM

## 2024-02-12 DIAGNOSIS — H81.90 VESTIBULAR DYSFUNCTION, UNSPECIFIED LATERALITY: ICD-10-CM

## 2024-02-12 DIAGNOSIS — M62.838 MUSCLE SPASMS OF BOTH LOWER EXTREMITIES: ICD-10-CM

## 2024-02-12 PROCEDURE — 97112 NEUROMUSCULAR REEDUCATION: CPT | Performed by: PHYSICAL THERAPIST

## 2024-02-12 PROCEDURE — 97110 THERAPEUTIC EXERCISES: CPT | Performed by: PHYSICAL THERAPIST

## 2024-02-12 PROCEDURE — 97164 PT RE-EVAL EST PLAN CARE: CPT | Performed by: PHYSICAL THERAPIST

## 2024-02-12 NOTE — PROGRESS NOTES
PT Re-Evaluation     Today's date: 2024  Patient name: Elena Multani  : 1970  MRN: [id number]  Referring provider: Miriam Harris, *  Dx:   Encounter Diagnosis     ICD-10-CM    1. Frequent falls  R29.6       2. Muscle spasms of both lower extremities  M62.838       3. Paraparesis of both lower limbs (HCC)  G82.20       4. Vestibular dysfunction, unspecified laterality  H81.90                      Assessment  Assessment details: Pt is a pleasant 53 y.o. female who has been consistently attending outpatient physical therapy with muscle spasms of both lower extremities, paraparesis of both lower limbs and frequent falls with complex past medical history. Subjectively, pt reports overall improvement in her balance and tolerance to ADLs within her home as well as decreased pain. Objectively, she demonstrates improvement in bilateral lower extremity and core strength, balance, gait symmetry/safety and tolerance to activity.  She continues with deficits in these areas as well as decreased right hip range of motion, decreased bilateral lower extremity flexibility. Pt will benefit from continued skilled PT intervention in order to address her remaining limitations and to restore maximal function.     Impairments: abnormal coordination, abnormal gait, abnormal or restricted ROM, activity intolerance, impaired balance, impaired physical strength, lacks appropriate home exercise program, pain with function and poor posture   Understanding of Dx/Px/POC: good   Prognosis: good    Goals  Short-Term Goals (4 weeks)   1. Patient will decrease worst rating of pain by 25% to improve quality of life.  2. Patient will increase strength by 1/2 MMT to improve quality of life with improved efficiency of daily activities.  3. Patient will improve ROM by 25% indicating improved mobility of affected area.    Long-Term Goals (8 weeks)   1. Patient will decrease pain by 50% at worst in comparison to IE indicating significant  "reduction in pain and improved quality of life.  2. Patient will demonstrate strength WFL compared to IE levels indicating ability to independently manage pain symptoms to accomplish daily activities.   3. Patient will be independent with HEP with good form accomplished.      Plan  Patient would benefit from: PT eval and skilled PT  Planned modality interventions: cryotherapy and thermotherapy: hydrocollator packs  Planned therapy interventions: IADL retraining, body mechanics training, flexibility, functional ROM exercises, home exercise program, neuromuscular re-education, manual therapy, postural training, strengthening, stretching, therapeutic activities, therapeutic exercise and joint mobilization  Frequency: 2x week  Duration in visits: 20  Duration in weeks: 12  Treatment plan discussed with: patient        Subjective Evaluation    History of Present Illness  Mechanism of injury: 2/12/24 Re-Evaluation:  Pt has been fairly consistently attending PT for 14 sessions for chronic progressive bilateral LE weakness, paraparesis and frequent falls with complex PMH. She reports she feels decreased pain and overall improvement in her balance and ability to perform ADLs within her home. She does report a \"setback\" approximately 2 weeks ago, went for short walk with her son (without SPC) and felt \"crippled\" due to muscular soreness in B LE and low back pain for approximately 1 week after.    Pain Location: bilateral LE aching, tingling, vibrating    Pain Type: bilateral anterior thigh    Pain Intensity:  Current: 2  Best: 2  Worst: 6    Pt reports increased pain and/or difficulty with: sit-stand transfers, immediate standing balance, walking, stairs.    Pt reports decreased pain with: rest, medication          Initial Evaluation:  Pt is a 53 year-old right-handed female with complex PMH presenting to PT with 8 year history of progressive bilateral LE weakness, paraparesis and frequent falls. She reports she began having " issues in 2015. She reports she was initially diagnosed with RA around 2012. She reports she had C4-C6 ACDF performed by Dr. Conde in 2012, left TSR in 2009 and 2021, both performed by Dr. Watson at [facility]. She reports she receives bi-weekly Humera injections. She is unable to take NSAIDS due to her medical history. Pt follows with pain management and neurology. Pt reports she has had 3 significant falls in the past year, her most recent fall was on 4/28/23, in which she sustained meniscal tear with MFC fracture, underwent surgery for this on 9/29/23. Pt also underwent left tympanostomy tube replacement 1/12/23 performed by Dr. Campos.    Pt reports her B LE weakness has progressively worsened over the past 2 months, as well as an aching/vibrating sensation in both of her legs.    She reports she has the sensation that she cannot feel where her legs are when she is walking and going up/down stairs.     Pt is on disability.    Pain Location: bilateral LE aching, tingling, vibrating    Pain Type: bilateral anterior thigh    Pain Intensity:  Current: 2  Best: 2  Worst: 8    Pt reports increased pain and/or difficulty with: sit-stand transfers, immediate standing balance, walking, stairs.    Pt reports decreased pain with: rest, medication            Recurrent probem    Quality of life: good    Patient Goals  Patient goals for therapy: increased strength, independence with ADLs/IADLs, improved balance, decreased pain and increased motion          Objective     Concurrent Complaints  Negative for night pain, disturbed sleep, bladder dysfunction, bowel dysfunction and saddle (S4) numbness    Neurological Testing     Sensation     Lumbar   Left   Intact: light touch    Right   Intact: light touch    Reflexes   Left   Patellar (L4): normal (2+)  Achilles (S1): normal (2+)    Right   Patellar (L4): normal (2+)  Achilles (S1): normal (2+)    Active Range of Motion     Lumbar   Flexion:  WFL  Extension:   Restriction level: minimal  Left lateral flexion:  WFL  Right lateral flexion:  WFL  Left rotation:  WFL  Right rotation:  WFL  Left Hip   Flexion: WFL  Extension: -5 degrees   Abduction: WFL  External rotation (prone): WFL  Internal rotation (prone): WFL    Right Hip   Flexion: WFL  Extension: -5 degrees   Abduction: WFL  External rotation (prone): 15 degrees   Internal rotation (prone): 10 degrees     Strength/Myotome Testing     Left Hip   Planes of Motion   Flexion: 4+  Extension: 4-  Abduction: 4-  Adduction: 4+  External rotation: 4  Internal rotation: 4    Right Hip   Planes of Motion   Flexion: 4+  Extension: 4-  Abduction: 4-  Adduction: 4+  External rotation: 4  Internal rotation: 4    Left Knee   Flexion: 4+  Extension: 4+    Right Knee   Flexion: 4+  Extension: 4+    Left Ankle/Foot   Dorsiflexion: 5  Plantar flexion: 5    Right Ankle/Foot   Dorsiflexion: 5  Plantar flexion: 5    Tests     Left Hip   Modified Jorge Alberto: Positive.   90/90 SLR: Positive.     Right Hip   Modified Jorge Alberto: Positive.   90/90 SLR: Positive.       Biodex Balance Assessment  Direction Control 12/26/23 2/12/24 Goal   Overall 39 88 65   Forward 46 90 65   Backward 54 79 30   Left  43 95 65   Right  48 93 65   Forward/Left 38 95 65   Forward/Right 41 81 65   Backward/Left 31 87 65   Backward/Right 49 94 65          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    Chronic neck pain    Numbness and tingling in left arm    Real time reverse transcriptase PCR positive for COVID-19 virus    Dizziness and giddiness    Persistent postural-perceptual dizziness    Paraparesis of both lower limbs (HCC)    Cervical radiculopathy at  "C7    CNS demyelination (HCC)             Daily Treatment Diary      Assessment 1/30 2/1 2/6 2/8 2/12 1/9 1/16 1/18 1/23   Eval/Reval                    FOTO                 **   Manuals    B HF Stretching                       R Hip ER PROM - propped prone                          Prescribed POC    Pt Education                       HEP Issued/Updated                       Bike  L1x5'  L1x5'  L1x5'  L1x5' L1x5'   L1x5'  L1x5'  L1x5'  L1x5'    Treadmill              Hams/gastroc strap stretch    15\"x3 ea B          Bridges + Hip ADD 5\"  2x15 5\"  2x15 5\"  2x15 5\"1x10 5\"  2x10  5\"  2x10 5\"  2x10 5\"  2x15 5\"  2x15    Bridges + TB Hip ABD Clamshells OTB  5\"x15  Clamshells  OTB  5\" 2x10 ClamshellsOTB   R 5\"2x10  L 5\" 1x10   Clamshells  5\"  2x10 ea Clamshells  OTB  5\"   1x10 ea Clamshells  OTB  5\" Clamshells OTB  5\"x15    TB Clamshells     [place]  5\"  2x10ea         Biodex Testing     Performed         Biodex LOS  Level 2  Trial:   46\", 71%    Trial 2: 50\", 78%    Trial 3:  51\", 81%   level 2:  Triall 1:  50\" 69%    Trial 2:  45\" 81%    Trial 3:  41\" 76%     Level 2  Trial 1:  48\", 85%    Trial 2:  45\", 85%    Trial 3:  47\", 79%   NV Level 2  Trial 1:  37\", 87%    Trial 2:  38\", 83%    Trial 3:  43\", 73% Level 3:  level 1  Trial 1: 35\"  64%  Trial 2: 38\"  82%  Trial 3: 37\"  80%  Level 1  Trial 1: 39\" 78%  Trial 2: 35\"  83%  Trial 3: 35\"  84%    Level 2  45\"  79%    Level 1  Trial 1: 37\"  81%  Trial 2: 40\" 83%  Trial 3: 46\" 73%  Level 1  Trial : 38\"  81%    Trial 2: 41\"  79%    Trial 3: 40\"  88%    Biodex - Maze  Level 1  Trial 1: 42\", 92%  Trial 2:  41\", 96%  Trial 3:  36%,100%  Level 1:  Trial 1:  29\" 96%  Trial 2:  33\" 96%  Trial 3:  38\" 100% Level 1:  Trial1:  28\", 100%  Trial 2:  32\", 92%  Trial 3:  28\", 100%     NV Level 1:  Trial1:  35\", 85%  Trial 2:  25\", 96%  Trial 3:  20\", 100% Level 2:  Level 1  Trial 1:32\"  89%  Trial 2: 28\"  100%  Trial 3:31\"  100%  level 1  Trial 1: 28\"  100%  Trial 2: " "47\"  98%  Level 2  Trial 1:  Level 1  Trial 1: 42\"  100%  Trial2: 40\"  100%  Trial 3: 36\"  96%  Level 1  Trial  35\"  96%  Trial 2:  32\" 85%  Trial 3: 37\"  96%    FTEO - Airex  30\"x2  30\"x2 30\"x2  NV   15\"x1  20\"x1  25\"x1  15\"x1  20\"x1  25\"x1 20\"x1  25\"x1  30\"x1  20\"x1  25\"x1  30\"x1    FAEC - Floor  30\"x2  30\"x2 30\"x2 NV NV   10\"x1  15\"x1  20\"x1  10\"x1  15\"x1  20\"x1  10\"x1  15\"x1  20\"x  10\"x1  20\"x1  25\"x1    FAEO - Head Turns L/R Airex  Head turns x20  CG/CS Airex  Head turns x20  CG/CS Airex head turns x20 CS NV NV  Airex  10 nods  CS/CG Airex  10 nodsx2  CS/CG Airex  [id number]  CG/CS Airex  Head turns x20  CG/CS   FAEO - Head Nods Up/Down Airex  Nods  X20 CG/CS Airex  Nods  X20   CG/CS Airex nods  x20   CS NV NV  Airex  10 nods x2  CS/CG Airex  10 nodsx2  CS/CG Airex  [id number]  CG/CS Airex  Nods  X20 CG/CS    Tandem Balance - Modified                   Tandem Balance 15\"x3 ea 15\"x3 ea 20\"x2 ea NV NV  15\"x3 ea 15\"x3 ea 15x3 ea 15\"x3 ea    Tandem Walking                       Sidestepping  3 laps @ rail  4 laps @ rail  4 laps @ rail  NV  Red TB  4 laps     2 laps @ counter  3 laps @ railing  3 laps @ rail    Step Up & Over    0R  X6 ea  0R  X8 ea  NV  NV             Step Ups     NV         Step Downs     NV         Gait with Head Turns Up/down  Side/side  @ rail x4 laps ea Up/down  Side/side  @ rail x4 laps ea Up/down side/side @ rail x4 laps ea NV NV  Up/down  Side/side  @ railing  4 laps ea   CS Up/down  Side/side  @ counter  3 lapts ea  CS Up/down  Side/side  @ railing  4 laps ea  CS Up/down  Side/side  @ rail x4 laps sherin Shafer with Head Nods              Obstacle Course                      Modalities                                              "

## 2024-02-13 ENCOUNTER — APPOINTMENT (OUTPATIENT)
Dept: PHYSICAL THERAPY | Facility: REHABILITATION | Age: 54
End: 2024-02-13
Payer: COMMERCIAL

## 2024-02-14 ENCOUNTER — OFFICE VISIT (OUTPATIENT)
Dept: NEUROLOGY | Facility: CLINIC | Age: 54
End: 2024-02-14
Payer: COMMERCIAL

## 2024-02-14 VITALS
HEIGHT: 67 IN | BODY MASS INDEX: 37.49 KG/M2 | HEART RATE: 81 BPM | WEIGHT: 238.9 LBS | DIASTOLIC BLOOD PRESSURE: 90 MMHG | SYSTOLIC BLOOD PRESSURE: 130 MMHG | TEMPERATURE: 98.1 F | OXYGEN SATURATION: 98 %

## 2024-02-14 DIAGNOSIS — G44.099 TRIGEMINAL AUTONOMIC CEPHALGIAS: ICD-10-CM

## 2024-02-14 DIAGNOSIS — H81.90 VESTIBULAR DYSFUNCTION, UNSPECIFIED LATERALITY: ICD-10-CM

## 2024-02-14 DIAGNOSIS — G93.2 PSEUDOTUMOR CEREBRI: Primary | ICD-10-CM

## 2024-02-14 DIAGNOSIS — H53.9 TRANSIENT VISION DISTURBANCE OF BOTH EYES: ICD-10-CM

## 2024-02-14 DIAGNOSIS — G37.9 CNS DEMYELINATION (HCC): ICD-10-CM

## 2024-02-14 DIAGNOSIS — M54.12 CERVICAL RADICULOPATHY AT C7: ICD-10-CM

## 2024-02-14 PROCEDURE — 99215 OFFICE O/P EST HI 40 MIN: CPT | Performed by: PSYCHIATRY & NEUROLOGY

## 2024-02-14 RX ORDER — METOPROLOL SUCCINATE 25 MG
TABLET, EXTENDED RELEASE 24 HR ORAL
COMMUNITY

## 2024-02-14 RX ORDER — TOPIRAMATE 25 MG/1
50 TABLET ORAL 2 TIMES DAILY
Qty: 120 TABLET | Refills: 5 | Status: SHIPPED | OUTPATIENT
Start: 2024-02-14

## 2024-02-14 NOTE — PROGRESS NOTES
Patient ID: Elena Multani is a 53 y.o. female.    Assessment/Plan:           Problem List Items Addressed This Visit          Nervous and Auditory    Cervical radiculopathy at C7    CNS demyelination (HCC)    Vestibular dysfunction    Pseudotumor cerebri - Primary    Relevant Medications    topiramate (Topamax) 25 mg tablet       Other    Transient vision disturbance of both eyes    Relevant Medications    topiramate (Topamax) 25 mg tablet    Other Relevant Orders    VAS temporal artery duplex     Other Visit Diagnoses       Trigeminal autonomic cephalgias        Relevant Medications    Toprol XL 25 MG 24 hr tablet    topiramate (Topamax) 25 mg tablet    Other Relevant Orders    VAS temporal artery duplex           Mrs. Multani has presented to [facility] multiple sclerosis center for follow-up on intractable headaches and related questions.  Patient was diagnosed with presumable pseudotumor cerebri in the setting of weight gain with great response described since restarted Topamax 75 mg twice daily.  Patient has been headache free with intermittent floaters been described but no signs of transient visual obscuration been noted.    Unfortunately, patient has not been evaluated by ophthalmology yet with follow-up been pending.    Patient was not able to lose the weight as she has been gaining weight gradually as she has appetite increased due to her medical regimens.    Patient has been working with physical therapy and scheduled basis with good outcomes been reported as patient has significant improvement in her balance, at the same time patient has worsening stiffness in lower extremity with muscle spasms and mild exacerbation of psoriatic arthritis requiring adjustment of Humira with normal ESR/CRP reported on January 2, 2024.    Patient also describes Topamax has improved with no concern including worsening anxiety and depression as she has suicidal ideation with remote history of depression.  Patient has been  working with primary care team with Effexor being discontinued and switched to Celexa.    Patient was advised to decrease Topamax to 50 mg twice daily.  Patient also reported sharp pain in her left temporal region with pain radiates to vertex and last split seconds.  Patient stated she feels like she is developing subcutaneous swelling and lump-patient will be offering temporal artery ultrasound to rule out temporal artery angiitis despite patient has normal ESR/CRP in January 2024.  After neurological evaluation still would be beneficial.     Patient describes no new sensorimotor dysfunction.  Patient ambulates with a walker.    Patient described worsening cognitive function-we discussed side effects of Topamax and with gradual discontinuation Topamax and transition to other medical regimens for patient migraine headaches, we may consider cognitive therapy sometimes in the future.  Patient is to follow with headache specialist Derick in 2-3 months.        Subjective: headaches, floaters, disbalance    HPI    Mrs. Multani has presented to [facility] multiple sclerosis center for follow-up on multiple neurological complaints.      Patient has developed several instances of transient visual obscuration with persistent chronic daily headaches and tinnitus. Several prior hospitalization brought concern patient likely developing pseudotumor cerebri in the setting of weight gain.  Patient has been working with dietitian and was offered weight management team as treatment for pseudotumor cerebri is weight loss.  Patient stated she continue gaining weight.  Patient stated she had hysterectomy many years ago history of quarter of orders been left.  Patient had significant improvement in her headache with Topamax 75 mg twice daily but patient had worsening cognitive function and mood.  Patient stated her anxiety and depression has much progressed.  Patient does not have establish care with psychiatry so psychotherapist and  her most changes treated by primary care team.    Patient described sharp stabbing pain in the left temporofrontal area which last couple seconds with intermittent floaters in her eyes bilaterally.  Patient has no signs of transient visual obscuration, no vision loss, no double vision.  Patient believes she has a lump on palpation in her temporal region but no redness.  No hearing loss, no facial asymmetry, no vertigo described.    Patient required Humira adjustment for her psoriatic arthritis with serologic workup completed recently suggest normal ESR/CRP.    Patient did not follow-up with ophthalmology.    Patient enjoys working with physical therapy for paraparesis of lower extremity, vestibular dysfunction, frequent falls and muscle spasm lower extremity.  Patient works on her balance but she feels more exhausted at the end.         Ketorolac 10 mg was advised for breakthrough headaches.       Patient continued treatment of B12 deficiency due to pernicious anemia as she has close follow-up with hematology team due to factor V Leiden mutation.     If patient has developed intractable headache-patient will be advised ER visit for STAT LP and initiating Diamox 1000 twice daily.     No new focal weakness noted.  No imaging advised this office visit    The following portions of the patient's history were reviewed and updated as appropriate: She  has a past medical history of Atrophy of vagina, Avascular necrosis (HCC), Cluster headache, Depression, Difficulty walking (2years), Easy bruisability, Endometriosis, Factor V Leiden mutation (HCC), Fibromyalgia, primary (2yrs), HL (hearing loss) (Past 8 months), migraines, Hypertension, Lichen sclerosus, Memory loss (Past couple years), Migraine (Migraines past 2 years), Ovarian cyst, Pelvic pain, Periodic limb movement disorder, Peripheral neuropathy, Psoriasis (2021), and Vision loss (2years).  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  Her family history includes Atrial fibrillation in her mother; Diabetes in her mother; Hypertension in her mother; Migraines in her mother; No Known Problems in her father; Stroke in her mother and paternal grandfather.  She  reports that she has been smoking cigarettes. She has a 30 pack-year smoking history. She has never used smokeless tobacco. She reports that she does not drink alcohol and does not use drugs.  Current Outpatient Medications   Medication Sig Dispense Refill    atorvastatin (LIPITOR) 10 mg tablet Take 10 mg by mouth daily      citalopram (CeleXA) 20 mg tablet Take 20 mg by mouth daily      clobetasol (TEMOVATE) 0.05 % cream Apply topically 2 (two) times a day as needed      Cobalamine Combinations (B-12) 1000-400 MCG SUBL Inject into a muscle every 30 (thirty) days      Cyanocobalamin 1000 MCG/ML KIT Inject 1,000 mcg into a muscle every 30  (thirty) days      ergocalciferol (VITAMIN D2) 50,000 units TAKE 1 CAPSULE (50,000 UNITS TOTAL) BY MOUTH ONCE A WEEK 4 capsule 0    Humira Pen 40 MG/0.4ML PNKT Inject 40 mg under the skin every 14 (fourteen) days      ketorolac (TORADOL) 10 mg tablet Take 1 tablet (10 mg total) by mouth every 6 (six) hours as needed for moderate pain 10 tablet 5    methocarbamol (ROBAXIN) 750 mg tablet TAKE 1 TABLET (750 MG TOTAL) BY MOUTH EVERY 8 (EIGHT) HOURS 90 tablet 1    SUMAtriptan (IMITREX) 50 mg tablet Take 1 tablet (50 mg total) by mouth once as needed for migraine for up to 1 dose 9 tablet 2    topiramate (Topamax) 25 mg tablet Take 2 tablets (50 mg total) by mouth 2 (two) times a day 120 tablet 5    Toprol XL 25 MG 24 hr tablet        No current facility-administered medications for this visit.     Current Outpatient Medications on File Prior to Visit   Medication Sig    atorvastatin (LIPITOR) 10 mg tablet Take 10 mg by mouth daily    citalopram (CeleXA) 20 mg tablet Take 20 mg by mouth daily    clobetasol (TEMOVATE) 0.05 % cream Apply topically 2 (two) times a day as needed    Cobalamine Combinations (B-12) 1000-400 MCG SUBL Inject into a muscle every 30 (thirty) days    Cyanocobalamin 1000 MCG/ML KIT Inject 1,000 mcg into a muscle every 30 (thirty) days    ergocalciferol (VITAMIN D2) 50,000 units TAKE 1 CAPSULE (50,000 UNITS TOTAL) BY MOUTH ONCE A WEEK    Humira Pen 40 MG/0.4ML PNKT Inject 40 mg under the skin every 14 (fourteen) days    ketorolac (TORADOL) 10 mg tablet Take 1 tablet (10 mg total) by mouth every 6 (six) hours as needed for moderate pain    methocarbamol (ROBAXIN) 750 mg tablet TAKE 1 TABLET (750 MG TOTAL) BY MOUTH EVERY 8 (EIGHT) HOURS    SUMAtriptan (IMITREX) 50 mg tablet Take 1 tablet (50 mg total) by mouth once as needed for migraine for up to 1 dose    Toprol XL 25 MG 24 hr tablet     [DISCONTINUED] topiramate (Topamax) 25 mg tablet Take 1 tab at night x 3 days, then 1 tab bid x 3 days, then 1 tab  "am and 2 tabs at night x 3d, then 2 tabs bid  x 3 d; 2 tab in am and then 3 tab at night x 3 d; then 3 tabs bid    [DISCONTINUED] venlafaxine (EFFEXOR) 37.5 mg tablet Take 37.5 mg by mouth once    [DISCONTINUED] verapamil (CALAN) 40 mg tablet TAKE 1 TABLET (40 MG TOTAL) BY MOUTH 3 (THREE) TIMES A DAY (Patient not taking: Reported on 2/14/2024)     No current facility-administered medications on file prior to visit.     She is allergic to morphine, other, and nitrofurantoin..         Objective:    Blood pressure 130/90, pulse 81, temperature 98.1 °F (36.7 °C), temperature source Temporal, height 5' 7\" (1.702 m), weight 108 kg (238 lb 14.4 oz), SpO2 98%.    Physical Exam    Neurological Exam    CONSTITUTIONAL: NAD, pleasant. NECK: supple, no lymphadenopathy, no thyromegaly, no JVD. CARDIOVASCULAR: RRR, normal S1S2, no murmurs, no rubs. RESP: clear to auscultation bilaterally, no wheezes/rhonchi/rales. ABDOMEN: soft, non tender, non distended. SKIN: no rash or skin lesions. EXTREMITIES: no edema, pulses 2+bilaterally. PSYCH: appropriate mood and affect  NEUROLOGIC COMPREHENSIVE EXAM: Patient is oriented to person, place and time, NAD; appropriate affect. CN II, III, IV, V, VI, VII,VIII,IX,X,XI-XII intact with EOMI, PERRLA, OKN intact, VF grossly intact, fundi poorly visualized secondary to pupillary constriction; symmetric face noted. Motor: 5/5 UE/LE bilateral symmetric; Sensory: intact to light touch and pinprick bilaterally; normal vibration sensation feet bilaterally; Coordination within normal limits on FTN and MARSHA testing; DTR: 2/4 through, no Babinski, no clonus. Tandem gait is abnormal. Romberg: absent.  ROS:    Review of Systems   Constitutional:  Negative for appetite change, fatigue and fever.   HENT: Negative.  Negative for hearing loss, tinnitus, trouble swallowing and voice change.    Eyes: Negative.  Negative for photophobia, pain and visual disturbance.   Respiratory: Negative.  Negative for shortness " of breath.    Cardiovascular: Negative.  Negative for palpitations.   Gastrointestinal: Negative.  Negative for nausea and vomiting.   Endocrine: Negative.  Negative for cold intolerance.   Genitourinary: Negative.  Negative for dysuria, frequency and urgency.   Musculoskeletal:  Negative for back pain, gait problem, myalgias, neck pain and neck stiffness.   Skin: Negative.  Negative for rash.   Allergic/Immunologic: Negative.    Neurological: Negative.  Negative for dizziness, tremors, seizures, syncope, facial asymmetry, speech difficulty, weakness, light-headedness, numbness and headaches.   Hematological: Negative.  Does not bruise/bleed easily.   Psychiatric/Behavioral: Negative.  Negative for confusion, hallucinations and sleep disturbance.

## 2024-02-15 ENCOUNTER — TELEPHONE (OUTPATIENT)
Dept: BARIATRICS | Facility: CLINIC | Age: 54
End: 2024-02-15

## 2024-02-19 ENCOUNTER — APPOINTMENT (OUTPATIENT)
Dept: PHYSICAL THERAPY | Facility: REHABILITATION | Age: 54
End: 2024-02-19
Payer: COMMERCIAL

## 2024-02-21 ENCOUNTER — OFFICE VISIT (OUTPATIENT)
Dept: PHYSICAL THERAPY | Facility: REHABILITATION | Age: 54
End: 2024-02-21
Payer: COMMERCIAL

## 2024-02-21 DIAGNOSIS — H81.90 VESTIBULAR DYSFUNCTION, UNSPECIFIED LATERALITY: ICD-10-CM

## 2024-02-21 DIAGNOSIS — M62.838 MUSCLE SPASMS OF BOTH LOWER EXTREMITIES: ICD-10-CM

## 2024-02-21 DIAGNOSIS — G82.20 PARAPARESIS OF BOTH LOWER LIMBS (HCC): ICD-10-CM

## 2024-02-21 DIAGNOSIS — R29.6 FREQUENT FALLS: Primary | ICD-10-CM

## 2024-02-21 PROCEDURE — 97110 THERAPEUTIC EXERCISES: CPT

## 2024-02-21 PROCEDURE — 97112 NEUROMUSCULAR REEDUCATION: CPT

## 2024-02-21 PROCEDURE — 97530 THERAPEUTIC ACTIVITIES: CPT

## 2024-02-21 NOTE — PROGRESS NOTES
Daily Note     Today's date: 2024  Patient name: Elena Multani  : 1970  MRN: [id number]  Referring provider: Miriam Harris, *  Dx:   Encounter Diagnosis     ICD-10-CM    1. Frequent falls  R29.6       2. Muscle spasms of both lower extremities  M62.838       3. Paraparesis of both lower limbs (HCC)  G82.20       4. Vestibular dysfunction, unspecified laterality  H81.90                      Subjective: pt reports she feels like she's regressing and hasn't been having good days. She feels more off balance lately, but denied falls.      Objective: See treatment diary below      Assessment: Tolerated treatment well and without complaints. CG/CS with balance activities; good neuromuscular control with intermittent LOB, but good ankle.hip strategy to recover. Patient demonstrated fatigue post treatment, exhibited good technique with therapeutic exercises, and would benefit from continued PT      Plan: Continue per plan of care.  Progress treatment as tolerated.       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    Chronic neck pain    Numbness and tingling in left arm    Real time reverse transcriptase PCR positive for COVID-19 virus    Dizziness and giddiness    Persistent postural-perceptual dizziness    Paraparesis of both lower limbs (HCC)    Cervical radiculopathy at C7    CNS demyelination (HCC)             Daily Treatment Diary      Assessment    Eval/Reval                    FOTO                 **   Manuals    B HF Stretching                       R Hip ER PROM - propped prone                          Prescribed POC    Pt  "Education                       HEP Issued/Updated                       Bike  L1x5'  L1x5'  L1x5'  L1x5' L1x5' L1x5'   L1x5'  L1x5'  L1x5'    Treadmill              Hams/gastroc strap stretch    15\"x3 ea B          Bridges + Hip ADD 5\"  2x15 5\"  2x15 5\"  2x15 5\"1x10 5\"  2x10 5\"  2x10  5\"  2x10 5\"  2x15 5\"  2x15    Bridges + TB Hip ABD Clamshells OTB  5\"x15  Clamshells  OTB  5\" 2x10 ClamshellsOTB   R 5\"2x10  L 5\" 1x10    Clamshells  OTB  5\"   1x10 ea Clamshells  OTB  5\" Clamshells OTB  5\"x15    TB Clamshells     [place]  5\"  2x10ea Orange  5\"  2x10ea        Biodex Testing     Performed         Biodex LOS  Level 2  Trial:   46\", 71%    Trial 2: 50\", 78%    Trial 3:  51\", 81%   level 2:  Triall 1:  50\" 69%    Trial 2:  45\" 81%    Trial 3:  41\" 76%     Level 2  Trial 1:  48\", 85%    Trial 2:  45\", 85%    Trial 3:  47\", 79%   NV Level 2  Trial 1:  37\", 87%    Trial 2:  38\", 83%    Trial 3:  43\", 73% Level 3:  Trial 1:  39\", 88%    Trial 2:  43\", 86%    Trial 3:  46\", 86%   Level 1  Trial 1: 39\" 78%  Trial 2: 35\"  83%  Trial 3: 35\"  84%    Level 2  45\"  79%    Level 1  Trial 1: 37\"  81%  Trial 2: 40\" 83%  Trial 3: 46\" 73%  Level 1  Trial : 38\"  81%    Trial 2: 41\"  79%    Trial 3: 40\"  88%    Biodex - Maze  Level 1  Trial 1: 42\", 92%  Trial 2:  41\", 96%  Trial 3:  36%,100%  Level 1:  Trial 1:  29\" 96%  Trial 2:  33\" 96%  Trial 3:  38\" 100% Level 1:  Trial1:  28\", 100%  Trial 2:  32\", 92%  Trial 3:  28\", 100%     NV Level 1:  Trial1:  35\", 85%  Trial 2:  25\", 96%  Trial 3:  20\", 100% Level 2:  Trial 1:  1'29\", 66%    Trial 2:  1'30\", 76%     level 1  Trial 1: 28\"  100%  Trial 2: 47\"  98%  Level 2  Trial 1:  Level 1  Trial 1: 42\"  100%  Trial2: 40\"  100%  Trial 3: 36\"  96%  Level 1  Trial  35\"  96%  Trial 2:  32\" 85%  Trial 3: 37\"  96%    FTEO - Airex  30\"x2  30\"x2 30\"x2  NV 30\"x2   15\"x1  20\"x1  25\"x1 20\"x1  25\"x1  30\"x1  20\"x1  25\"x1  30\"x1    FAEC - Floor  30\"x2  30\"x2 30\"x2 NV NV 30\"x2   10\"x1  15\"x1  20\"x1  " "10\"x1  15\"x1  20\"x  10\"x1  20\"x1  25\"x1    FAEO - Head Turns L/R Airex  Head turns x20  CG/CS Airex  Head turns x20  CG/CS Airex head turns x20 CS NV NV Airex head turns 30\"x2  Airex  10 nodsx2  CS/CG Airex  [id number]  CG/CS Airex  Head turns x20  CG/CS   FAEO - Head Nods Up/Down Airex  Nods  X20 CG/CS Airex  Nods  X20   CG/CS Airex nods  x20   CS NV NV Airex nods  30\"x2   CS  Airex  10 nodsx2  CS/CG Airex  [id number]  CG/CS Airex  Nods  X20 CG/CS    Tandem Balance - Modified                  Tandem Balance 15\"x3 ea 15\"x3 ea 20\"x2 ea NV NV 20\"x2 ea  15\"x3 ea 15x3 ea 15\"x3 ea    Tandem Walking                       Sidestepping  3 laps @ rail  4 laps @ rail  4 laps @ rail  NV  Red TB  4 laps Red TB  4 laps    2 laps @ counter  3 laps @ railing  3 laps @ rail    Step Up & Over    0R  X6 ea  0R  X8 ea  NV  NV NV            Step Ups     NV 0R 1x10        Step Downs     NV 0R 1x10        Gait with Head Turns Up/down  Side/side  @ rail x4 laps ea Up/down  Side/side  @ rail x4 laps ea Up/down side/side @ rail x4 laps ea NV NV Up/down side/side @ rail x4 laps ea  Up/down  Side/side  @ counter  3 lapts ea  CS Up/down  Side/side  @ railing  4 laps ea  CS Up/down  Side/side  @ rail x4 laps ea    Gaith with Head Nods              Obstacle Course                      Modalities                                              "

## 2024-02-26 ENCOUNTER — APPOINTMENT (OUTPATIENT)
Dept: PHYSICAL THERAPY | Facility: REHABILITATION | Age: 54
End: 2024-02-26
Payer: COMMERCIAL

## 2024-02-27 ENCOUNTER — HOSPITAL ENCOUNTER (OUTPATIENT)
Dept: NON INVASIVE DIAGNOSTICS | Facility: HOSPITAL | Age: 54
Discharge: HOME | End: 2024-02-27
Attending: PSYCHIATRY & NEUROLOGY
Payer: COMMERCIAL

## 2024-02-27 DIAGNOSIS — G44.099 TRIGEMINAL AUTONOMIC CEPHALGIAS: ICD-10-CM

## 2024-02-27 DIAGNOSIS — H53.9 TRANSIENT VISION DISTURBANCE OF BOTH EYES: ICD-10-CM

## 2024-02-27 PROCEDURE — 93882 EXTRACRANIAL UNI/LTD STUDY: CPT | Performed by: SURGERY

## 2024-02-27 PROCEDURE — 93882 EXTRACRANIAL UNI/LTD STUDY: CPT

## 2024-02-28 ENCOUNTER — OFFICE VISIT (OUTPATIENT)
Dept: PHYSICAL THERAPY | Facility: REHABILITATION | Age: 54
End: 2024-02-28
Payer: COMMERCIAL

## 2024-02-28 DIAGNOSIS — G82.20 PARAPARESIS OF BOTH LOWER LIMBS (HCC): ICD-10-CM

## 2024-02-28 DIAGNOSIS — R29.6 FREQUENT FALLS: Primary | ICD-10-CM

## 2024-02-28 DIAGNOSIS — H81.90 VESTIBULAR DYSFUNCTION, UNSPECIFIED LATERALITY: ICD-10-CM

## 2024-02-28 DIAGNOSIS — M62.838 MUSCLE SPASMS OF BOTH LOWER EXTREMITIES: ICD-10-CM

## 2024-02-28 PROCEDURE — 97112 NEUROMUSCULAR REEDUCATION: CPT

## 2024-02-28 PROCEDURE — 97530 THERAPEUTIC ACTIVITIES: CPT

## 2024-02-28 PROCEDURE — 97110 THERAPEUTIC EXERCISES: CPT

## 2024-02-28 NOTE — PROGRESS NOTES
Daily Note     Today's date: 2024  Patient name: Elena Multani  : 1970  MRN: [id number]  Referring provider: Miriam Harris, *  Dx:   Encounter Diagnosis     ICD-10-CM    1. Frequent falls  R29.6       2. Muscle spasms of both lower extremities  M62.838       3. Paraparesis of both lower limbs (HCC)  G82.20       4. Vestibular dysfunction, unspecified laterality  H81.90                      Subjective: pt reports she continues to experience increased frequency of LOB. She noted undergoing a vascular scan with negative results, but looking into it a little furhter.      Objective: See treatment diary below      Assessment: Tolerated treatment well. Good resposne with progressions. Intermittent LOB with exercises, particularly balance activities with head turns/nods. Patient demonstrated fatigue post treatment, exhibited good technique with therapeutic exercises, and would benefit from continued PT      Plan: Continue per plan of care.  Progress treatment as tolerated.       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    Chronic neck pain    Numbness and tingling in left arm    Real time reverse transcriptase PCR positive for COVID-19 virus    Dizziness and giddiness    Persistent postural-perceptual dizziness    Paraparesis of both lower limbs (HCC)    Cervical radiculopathy at C7    CNS demyelination (HCC)             Daily Treatment Diary      Assessment    Eval/Reval                    FOTO                 **   Manuals    B HF Stretching                       R Hip ER PROM - propped prone                          Prescribed POC  "   Pt Education                       HEP Issued/Updated                       Bike  L1x5'  L1x5'  L1x5'  L1x5' L1x5' L1x5' L1x5'   L1x5'  L1x5'    Treadmill              Hams/gastroc strap stretch    15\"x3 ea B          Bridges + Hip ADD 5\"  2x15 5\"  2x15 5\"  2x15 5\"1x10 5\"  2x10 5\"  2x10 5\"  2x10  5\"  2x15 5\"  2x15    Bridges + TB Hip ABD Clamshells OTB  5\"x15  Clamshells  OTB  5\" 2x10 ClamshellsOTB   R 5\"2x10  L 5\" 1x10     Clamshells  OTB  5\" Clamshells OTB  5\"x15    TB Clamshells     [place]  5\"  2x10ea Orange  5\"  2x10ea Pink 5\"2x10 ea       Biodex Testing     Performed         Biodex LOS  Level 2  Trial:   46\", 71%    Trial 2: 50\", 78%    Trial 3:  51\", 81%   level 2:  Triall 1:  50\" 69%    Trial 2:  45\" 81%    Trial 3:  41\" 76%     Level 2  Trial 1:  48\", 85%    Trial 2:  45\", 85%    Trial 3:  47\", 79%   NV Level 2  Trial 1:  37\", 87%    Trial 2:  38\", 83%    Trial 3:  43\", 73% Level 3:  Trial 1:  39\", 88%    Trial 2:  43\", 86%    Trial 3:  46\", 86% Level 3:  Trial 1:  46\", 75%    Trial 2:  46\", 79%    Trial 3:  40\", 85%   Level 1  Trial 1: 37\"  81%  Trial 2: 40\" 83%  Trial 3: 46\" 73%  Level 1  Trial : 38\"  81%    Trial 2: 41\"  79%    Trial 3: 40\"  88%    Biodex - Maze  Level 1  Trial 1: 42\", 92%  Trial 2:  41\", 96%  Trial 3:  36%,100%  Level 1:  Trial 1:  29\" 96%  Trial 2:  33\" 96%  Trial 3:  38\" 100% Level 1:  Trial1:  28\", 100%  Trial 2:  32\", 92%  Trial 3:  28\", 100%     NV Level 1:  Trial1:  35\", 85%  Trial 2:  25\", 96%  Trial 3:  20\", 100% Level 2:  Trial 1:  1'29\", 66%    Trial 2:  1'30\", 76%   Level 2  Trial 1:  1'12\", 86%    Trial 2:  1'11\", 82%   Level 1  Trial 1: 42\"  100%  Trial2: 40\"  100%  Trial 3: 36\"  96%  Level 1  Trial  35\"  96%  Trial 2:  32\" 85%  Trial 3: 37\"  96%    FTEO - Airex  30\"x2  30\"x2 30\"x2  NV 30\"x2 30\"x2  20\"x1  25\"x1  30\"x1  20\"x1  25\"x1  30\"x1    FAEC - Floor  30\"x2  30\"x2 30\"x2 NV NV 30\"x2 30\"x2   10\"x1  15\"x1  20\"x  10\"x1  20\"x1  25\"x1    FAEO - Head Turns L/R Airex  Head " "turns x20  CG/CS Airex  Head turns x20  CG/CS Airex head turns x20 CS NV NV Airex head turns 30\"x2 Airex head turns 30\"x2  Airex  [id number]  CG/CS Airex  Head turns x20  CG/CS   FAEO - Head Nods Up/Down Airex  Nods  X20 CG/CS Airex  Nods  X20   CG/CS Airex nods  x20   CS NV NV Airex nods  30\"x2   CS Airex nods  30\"x2   CS  Airex  [id number]  CG/CS Airex  Nods  X20 CG/CS    Tandem Balance - Modified                 Tandem Balance 15\"x3 ea 15\"x3 ea 20\"x2 ea NV NV 20\"x2 ea 20\"x2 ea  15x3 ea 15\"x3 ea    Tandem Walking                       Sideste[place]  3 laps @ rail  4 laps @ rail  4 laps @ rail  NV  Red TB  4 laps Red TB  4 laps  Red TB  4 laps    3 laps @ railing  3 laps @ rail    Step Up & Over    0R  X6 ea  0R  X8 ea  NV  NV NV  0R 1x10          Step Ups     NV 0R 1x10 0R 1x10       Step Downs     NV 0R 1x10 0R 1x10       Gait with Head Turns Up/down  Side/side  @ rail x4 laps ea Up/down  Side/side  @ rail x4 laps ea Up/down side/side @ rail x4 laps ea NV NV Up/down side/side @ rail x4 laps ea Up/down side/side x15' x2 laps ea CS/CG  Up/down  Side/side  @ railing  4 laps ea  CS Up/down  Side/side  @ rail x4 laps ea    Gaith with Head Nods              Obstacle Course                      Modalities                                                "

## 2024-02-29 ENCOUNTER — OFFICE VISIT (OUTPATIENT)
Dept: PHYSICAL THERAPY | Facility: REHABILITATION | Age: 54
End: 2024-02-29
Payer: COMMERCIAL

## 2024-02-29 DIAGNOSIS — R29.6 FREQUENT FALLS: Primary | ICD-10-CM

## 2024-02-29 DIAGNOSIS — M62.838 MUSCLE SPASMS OF BOTH LOWER EXTREMITIES: ICD-10-CM

## 2024-02-29 DIAGNOSIS — G82.20 PARAPARESIS OF BOTH LOWER LIMBS (HCC): ICD-10-CM

## 2024-02-29 DIAGNOSIS — H81.90 VESTIBULAR DYSFUNCTION, UNSPECIFIED LATERALITY: ICD-10-CM

## 2024-02-29 PROCEDURE — 97110 THERAPEUTIC EXERCISES: CPT

## 2024-02-29 PROCEDURE — 97530 THERAPEUTIC ACTIVITIES: CPT

## 2024-02-29 PROCEDURE — 97112 NEUROMUSCULAR REEDUCATION: CPT

## 2024-02-29 NOTE — PROGRESS NOTES
Daily Note     Today's date: 2024  Patient name: Elena Multani  : 1970  MRN: [id number]  Referring provider: Miriam Harris, *  Dx:   Encounter Diagnosis     ICD-10-CM    1. Frequent falls  R29.6       2. Muscle spasms of both lower extremities  M62.838       3. Paraparesis of both lower limbs (HCC)  G82.20       4. Vestibular dysfunction, unspecified laterality  H81.90                      Subjective: pt reports feeling better and steadier on her feet.       Objective: See treatment diary below      Assessment: Pt tolerated treatment well. Improved balance and proprioception with exercises today. Intermittent LOB with head nods/turns with some compensatory movements present. LOB regained with good ankle and hip strategy. Patient demonstrated fatigue post treatment, exhibited good technique with therapeutic exercises, and would benefit from continued PT.      Plan: Continue per plan of care.  Progress treatment as tolerated.       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    Chronic neck pain    Numbness and tingling in left arm    Real time reverse transcriptase PCR positive for COVID-19 virus    Dizziness and giddiness    Persistent postural-perceptual dizziness    Paraparesis of both lower limbs (HCC)    Cervical radiculopathy at C7    CNS demyelination (HCC)             Daily Treatment Diary      Assessment    Eval/Reval                    FOTO                 **   Manuals    B HF Stretching                       R Hip ER PROM - propped prone                          Prescribed POC    Pt Education                       HEP  "Issued/Updated                       Bike  L1x5'  L1x5'  L1x5'  L1x5' L1x5' L1x5' L1x5' L1x5'   L1x5'    Treadmill              Hams/gastroc strap stretch    15\"x3 ea B          Bridges + Hip ADD 5\"  2x15 5\"  2x15 5\"  2x15 5\"1x10 5\"  2x10 5\"  2x10 5\"  2x10 5\"3x10  5\"  2x15    Bridges + TB Hip ABD Clamshells OTB  5\"x15  Clamshells  OTB  5\" 2x10 ClamshellsOTB   R 5\"2x10  L 5\" 1x10      Clamshells OTB  5\"x15    TB Clamshells     [place]  5\"  2x10ea Orange  5\"  2x10ea Pink 5\"2x10 ea Pink 5\"2x10 ea      Biodex Testing     Performed         Biodex LOS  Level 2  Trial:   46\", 71%    Trial 2: 50\", 78%    Trial 3:  51\", 81%   level 2:  Triall 1:  50\" 69%    Trial 2:  45\" 81%    Trial 3:  41\" 76%     Level 2  Trial 1:  48\", 85%    Trial 2:  45\", 85%    Trial 3:  47\", 79%   NV Level 2  Trial 1:  37\", 87%    Trial 2:  38\", 83%    Trial 3:  43\", 73% Level 3:  Trial 1:  39\", 88%    Trial 2:  43\", 86%    Trial 3:  46\", 86% Level 3:  Trial 1:  46\", 75%    Trial 2:  46\", 79%    Trial 3:  40\", 85% Level 3:  Trial 1:  41\", 81%    Trial 2:  42\", 73%    Trial 3:  42\", 73%   Level 1  Trial : 38\"  81%    Trial 2: 41\"  79%    Trial 3: 40\"  88%    Biodex - Maze  Level 1  Trial 1: 42\", 92%  Trial 2:  41\", 96%  Trial 3:  36%,100%  Level 1:  Trial 1:  29\" 96%  Trial 2:  33\" 96%  Trial 3:  38\" 100% Level 1:  Trial1:  28\", 100%  Trial 2:  32\", 92%  Trial 3:  28\", 100%     NV Level 1:  Trial1:  35\", 85%  Trial 2:  25\", 96%  Trial 3:  20\", 100% Level 2:  Trial 1:  1'29\", 66%    Trial 2:  1'30\", 76%   Level 2  Trial 1:  1'12\", 86%    Trial 2:  1'11\", 82% Level 2  Trial 1:  55\", 66%    Trial 2:  1'05\", 58%   Level 1  Trial  35\"  96%  Trial 2:  32\" 85%  Trial 3: 37\"  96%    FTEO - Airex  30\"x2  30\"x2 30\"x2  NV 30\"x2 30\"x2 30\"x2   20\"x1  25\"x1  30\"x1    FAEC - Floor  30\"x2  30\"x2 30\"x2 NV NV 30\"x2 30\"x2 Foam  30\"x2   10\"x1  20\"x1  25\"x1    FAEO - Head Turns L/R Airex  Head turns x20  CG/CS Airex  Head turns x20  CG/CS Airex head turns x20 CS NV NV " "Airex head turns 30\"x2 Airex head turns 30\"x2 Airex head turns 30\"x2  Airex  Head turns x20  CG/CS   FAEO - Head Nods Up/Down Airex  Nods  X20 CG/CS Airex  Nods  X20   CG/CS Airex nods  x20   CS NV NV Airex nods  30\"x2   CS Airex nods  30\"x2   CS Airex nods  30\"x2   CS  Airex  Nods  X20 CG/CS    Tandem Balance - Modified                Tandem Balance 15\"x3 ea 15\"x3 ea 20\"x2 ea NV NV 20\"x2 ea 20\"x2 ea 30\"x2 ea  15\"x3 ea    Tandem Walking                      S[place]este[place]  3 laps @ rail  4 laps @ rail  4 laps @ rail  NV  Red TB  4 laps Red TB  4 laps  Red TB  4 laps  Red TB  4 laps   3 laps @ rail    Step Up & Over    0R  X6 ea  0R  X8 ea  NV  NV NV  0R 1x10  0R 1x10 ea       Step Ups     NV 0R 1x10 0R 1x10 0R 1x10      Step Downs     NV 0R 1x10 0R 1x10 0R 1x10      Gait with Head Turns Up/down  Side/side  @ rail x4 laps ea Up/down  Side/side  @ rail x4 laps ea Up/down side/side @ rail x4 laps ea NV NV Up/down side/side @ rail x4 laps ea Up/down side/side x15' x2 laps ea CS/CG Up/down side/side x15' x2 laps ea CS/CG  Up/down  Side/side  @ rail x4 laps ea    Gaith with Head Nods              Obstacle Course                      Modalities                                                  "

## 2024-03-05 ENCOUNTER — OFFICE VISIT (OUTPATIENT)
Dept: PHYSICAL THERAPY | Facility: REHABILITATION | Age: 54
End: 2024-03-05
Payer: COMMERCIAL

## 2024-03-05 DIAGNOSIS — H81.90 VESTIBULAR DYSFUNCTION, UNSPECIFIED LATERALITY: ICD-10-CM

## 2024-03-05 DIAGNOSIS — G82.20 PARAPARESIS OF BOTH LOWER LIMBS (HCC): ICD-10-CM

## 2024-03-05 DIAGNOSIS — R29.6 FREQUENT FALLS: Primary | ICD-10-CM

## 2024-03-05 DIAGNOSIS — M62.838 MUSCLE SPASMS OF BOTH LOWER EXTREMITIES: ICD-10-CM

## 2024-03-05 PROCEDURE — 97112 NEUROMUSCULAR REEDUCATION: CPT

## 2024-03-05 PROCEDURE — 97530 THERAPEUTIC ACTIVITIES: CPT

## 2024-03-05 PROCEDURE — 97110 THERAPEUTIC EXERCISES: CPT

## 2024-03-05 NOTE — PROGRESS NOTES
Daily Note     Today's date: 3/5/2024  Patient name: Elena Multani  : 1970  MRN: [id number]  Referring provider: Miriam Harris, *  Dx:   Encounter Diagnosis     ICD-10-CM    1. Frequent falls  R29.6       2. Muscle spasms of both lower extremities  M62.838       3. Paraparesis of both lower limbs (HCC)  G82.20       4. Vestibular dysfunction, unspecified laterality  H81.90                      Subjective: pt reports she has been experiencing intermittent burning sensation in bilateral anterior thighs with walking short distances and riding the recumbent bike. She noted walking into her kitchen and the burning sensation occurred but diminished once she rested.      Objective: See treatment diary below      Assessment: Pt tolerated treatment well and without complaints. Good tolerance with progressions. Pt exhibits improved balance and proprioception with balance activities. Bridges and clamshells to D/C'd to HEP to focus on functional and balance activities at present time. Patient demonstrated fatigue post treatment, exhibited good technique with therapeutic exercises, and would benefit from continued PT      Plan: Continue per plan of care.  Progress treatment as tolerated.       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    Chronic neck pain    Numbness and tingling in left arm    Real time reverse transcriptase PCR positive for COVID-19 virus    Dizziness and giddiness    Persistent postural-perceptual dizziness    Paraparesis of both lower limbs (HCC)    Cervical radiculopathy at C7    CNS demyelination (HCC)             Daily Treatment Diary      Assessment    "2/12 2/21  2/28  2/29  3/5     Eval/Reval                    FOTO                    Manuals    B HF Stretching                       R Hip ER PROM - propped prone                          Prescribed POC    Pt Education                       HEP Issued/Updated                       Bike  L1x5'  L1x5'  L1x5'  L1x5' L1x5' L1x5' L1x5' L1x5' L1x5'     Treadmill              Hams/gastroc strap stretch    15\"x3 ea B          Bridges + Hip ADD 5\"  2x15 5\"  2x15 5\"  2x15 5\"1x10 5\"  2x10 5\"  2x10 5\"  2x10 5\"3x10 HEP     Bridges + TB Hip ABD Clamshells OTB  5\"x15  Clamshells  OTB  5\" 2x10 ClamshellsOTB   R 5\"2x10  L 5\" 1x10          TB Clamshells     [place]  5\"  2x10ea Orange  5\"  2x10ea Pink 5\"2x10 ea Pink 5\"2x10 ea HEP     Biodex Testing     Performed         Biodex LOS  Level 2  Trial:   46\", 71%    Trial 2: 50\", 78%    Trial 3:  51\", 81%   level 2:  Triall 1:  50\" 69%    Trial 2:  45\" 81%    Trial 3:  41\" 76%     Level 2  Trial 1:  48\", 85%    Trial 2:  45\", 85%    Trial 3:  47\", 79%   NV Level 2  Trial 1:  37\", 87%    Trial 2:  38\", 83%    Trial 3:  43\", 73% Level 3:  Trial 1:  39\", 88%    Trial 2:  43\", 86%    Trial 3:  46\", 86% Level 3:  Trial 1:  46\", 75%    Trial 2:  46\", 79%    Trial 3:  40\", 85% Level 3:  Trial 1:  41\", 81%    Trial 2:  42\", 73%    Trial 3:  42\", 73% Level 3:  Trial 1:  38\", 89%    Trial 2:  37\", 87%    Trial 3:  39\", 83%     Biodex - Maze  Level 1  Trial 1: 42\", 92%  Trial 2:  41\", 96%  Trial 3:  36%,100%  Level 1:  Trial 1:  29\" 96%  Trial 2:  33\" 96%  Trial 3:  38\" 100% Level 1:  Trial1:  28\", 100%  Trial 2:  32\", 92%  Trial 3:  28\", 100%     NV Level 1:  Trial1:  35\", 85%  Trial 2:  25\", 96%  Trial 3:  20\", 100% Level 2:  Trial 1:  1'29\", 66%    Trial 2:  1'30\", 76%   Level 2  Trial 1:  1'12\", 86%    Trial 2:  1'11\", 82% Level 2  Trial 1:  55\", 66%    Trial 2:  1'05\", 58% Level 2  Trial 1:  1',10\", 76%    Trial 2:  1',6\", 80%    Trial 3:  1'8\", 76%     FTEO - Airex  30\"x2  30\"x2 30\"x2  NV 30\"x2 " "30\"x2 30\"x2 30\"x3     FAEC - Floor  30\"x2  30\"x2 30\"x2 NV NV 30\"x2 30\"x2 Foam  30\"x2 Airex 30\"x3     FAEO - Head Turns L/R Airex  Head turns x20  CG/CS Airex  Head turns x20  CG/CS Airex head turns x20 CS NV NV Airex head turns 30\"x2 Airex head turns 30\"x2 Airex head turns 30\"x2 FT  Airex head turns 30\"x2    FAEO - Head Nods Up/Down Airex  Nods  X20 CG/CS Airex  Nods  X20   CG/CS Airex nods  x20   CS NV NV Airex nods  30\"x2   CS Airex nods  30\"x2   CS Airex nods  30\"x2   CS FT  Airex nods  30\"x2   CS     Tandem Balance - Modified               Tandem Balance 15\"x3 ea 15\"x3 ea 20\"x2 ea NV NV 20\"x2 ea 20\"x2 ea 30\"x2 ea 30\"x2 ea     Tandem Walking                     Sidestepping  3 laps @ rail  4 laps @ rail  4 laps @ rail  NV  Red TB  4 laps Red TB  4 laps  Red TB  4 laps  Red TB  4 laps GTB 3 laps @ HR     Step Up & Over    0R  X6 ea  0R  X8 ea  NV  NV NV  0R 1x10  0R 1x10 ea 1R 1x10 ea     Step Ups     NV 0R 1x10 0R 1x10 0R 1x10 2R 1x10     Step Downs     NV 0R 1x10 0R 1x10 0R 1x10 1R 1x10     Gait with Head Turns Up/down  Side/side  @ rail x4 laps ea Up/down  Side/side  @ rail x4 laps ea Up/down side/side @ rail x4 laps ea NV NV Up/down side/side @ rail x4 laps ea Up/down side/side x15' x2 laps ea CS/CG Up/down side/side x15' x2 laps ea CS/CG Up/down side/side x15' x2 laps ea CS/CG     Gaith with Head Nods              Obstacle Course                      Modalities                                                    "

## 2024-03-06 ENCOUNTER — TELEPHONE (OUTPATIENT)
Dept: BARIATRICS | Facility: CLINIC | Age: 54
End: 2024-03-06

## 2024-03-07 ENCOUNTER — OFFICE VISIT (OUTPATIENT)
Dept: PHYSICAL THERAPY | Facility: REHABILITATION | Age: 54
End: 2024-03-07
Payer: COMMERCIAL

## 2024-03-07 DIAGNOSIS — G82.20 PARAPARESIS OF BOTH LOWER LIMBS (HCC): ICD-10-CM

## 2024-03-07 DIAGNOSIS — M62.838 MUSCLE SPASMS OF BOTH LOWER EXTREMITIES: ICD-10-CM

## 2024-03-07 DIAGNOSIS — R29.6 FREQUENT FALLS: Primary | ICD-10-CM

## 2024-03-07 DIAGNOSIS — H81.90 VESTIBULAR DYSFUNCTION, UNSPECIFIED LATERALITY: ICD-10-CM

## 2024-03-07 PROCEDURE — 97112 NEUROMUSCULAR REEDUCATION: CPT

## 2024-03-07 PROCEDURE — 97530 THERAPEUTIC ACTIVITIES: CPT

## 2024-03-07 PROCEDURE — 97110 THERAPEUTIC EXERCISES: CPT

## 2024-03-07 NOTE — PROGRESS NOTES
Daily Note     Today's date: 3/7/2024  Patient name: Elena Multani  : 1970  MRN: [id number]  Referring provider: Miriam Harris, *  Dx:   Encounter Diagnosis     ICD-10-CM    1. Frequent falls  R29.6       2. Muscle spasms of both lower extremities  M62.838       3. Paraparesis of both lower limbs (HCC)  G82.20       4. Vestibular dysfunction, unspecified laterality  H81.90                      Subjective: pt reports soreness in her knees for about 1.5 days. Denied soreness at present time.      Objective: See treatment diary below      Assessment: Tolerated treatment well and without complaints. Intermittent LOB with balance activities, but good ankle/hip strategy to regain balance.  Patient demonstrated fatigue post treatment, exhibited good technique with therapeutic exercises, and would benefit from continued PT.      Plan: Continue per plan of care.  Progress treatment as tolerated.       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    Chronic neck pain    Numbness and tingling in left arm    Real time reverse transcriptase PCR positive for COVID-19 virus    Dizziness and giddiness    Persistent postural-perceptual dizziness    Paraparesis of both lower limbs (HCC)    Cervical radiculopathy at C7    CNS demyelination (HCC)             Daily Treatment Diary      Assessment 1/30 2/1  2/6  2/8  2/12 2/21  2/28  2/29  3/5  3/7   Eval/Reval                    FOTO                    Manuals    B HF Stretching                       R Hip ER PROM - propped prone                          Prescribed POC    Pt Education                       HEP Issued/Updated                       Bike   "L1x5'  L1x5'  L1x5'  L1x5' L1x5' L1x5' L1x5' L1x5' L1x5' 1x    Treadmill              Hams/gastroc strap stretch    15\"x3 ea B          Bridges + Hip ADD 5\"  2x15 5\"  2x15 5\"  2x15 5\"1x10 5\"  2x10 5\"  2x10 5\"  2x10 5\"3x10 HEP     Bridges + TB Hip ABD Clamshells OTB  5\"x15  Clamshells  OTB  5\" 2x10 ClamshellsOTB   R 5\"2x10  L 5\" 1x10          TB Clamshells     Austell  5\"  2x10ea Orange  5\"  2x10ea Pink 5\"2x10 ea Pink 5\"2x10 ea HEP     Biodex Testing     Performed         Biodex LOS  Level 2  Trial:   46\", 71%    Trial 2: 50\", 78%    Trial 3:  51\", 81%   level 2:  Triall 1:  50\" 69%    Trial 2:  45\" 81%    Trial 3:  41\" 76%     Level 2  Trial 1:  48\", 85%    Trial 2:  45\", 85%    Trial 3:  47\", 79%   NV Level 2  Trial 1:  37\", 87%    Trial 2:  38\", 83%    Trial 3:  43\", 73% Level 3:  Trial 1:  39\", 88%    Trial 2:  43\", 86%    Trial 3:  46\", 86% Level 3:  Trial 1:  46\", 75%    Trial 2:  46\", 79%    Trial 3:  40\", 85% Level 3:  Trial 1:  41\", 81%    Trial 2:  42\", 73%    Trial 3:  42\", 73% Level 3:  Trial 1:  38\", 89%    Trial 2:  37\", 87%    Trial 3:  39\", 83% Level 3:  Trial 1:  45\", 71%    Trial 2:  37\", 71%    Trial 3:  44\", 77%    Biodex - Maze  Level 1  Trial 1: 42\", 92%  Trial 2:  41\", 96%  Trial 3:  36%,100%  Level 1:  Trial 1:  29\" 96%  Trial 2:  33\" 96%  Trial 3:  38\" 100% Level 1:  Trial1:  28\", 100%  Trial 2:  32\", 92%  Trial 3:  28\", 100%     NV Level 1:  Trial1:  35\", 85%  Trial 2:  25\", 96%  Trial 3:  20\", 100% Level 2:  Trial 1:  1'29\", 66%    Trial 2:  1'30\", 76%   Level 2  Trial 1:  1'12\", 86%    Trial 2:  1'11\", 82% Level 2  Trial 1:  55\", 66%    Trial 2:  1'05\", 58% Level 2  Trial 1:  1',10\", 76%    Trial 2:  1',6\", 80%    Trial 3:  1'8\", 76% Level 2  Trial 1:  53\", 88%    Trial 2:  1'06\", 92%    Trial 3:  58\", 92%    FTEO - Airex  30\"x2  30\"x2 30\"x2  NV 30\"x2 30\"x2 30\"x2 30\"x3 30\"x3    FAEC - Floor  30\"x2  30\"x2 30\"x2 NV NV 30\"x2 30\"x2 Foam  30\"x2 Airex 30\"x3 Airex 30\"x3    FAEO - Head Turns L/R " "Airex  Head turns x20  CG/CS Airex  Head turns x20  CG/CS Airex head turns x20 CS NV NV Airex head turns 30\"x2 Airex head turns 30\"x2 Airex head turns 30\"x2 FT  Airex head turns 30\"x2 FT airex head turns 30\"x2   FAEO - Head Nods Up/Down Airex  Nods  X20 CG/CS Airex  Nods  X20   CG/CS Airex nods  x20   CS NV NV Airex nods  30\"x2   CS Airex nods  30\"x2   CS Airex nods  30\"x2   CS FT  Airex nods  30\"x2   CS FT  Airex nods  30\"x2   CS    Tandem Balance - Modified               Tandem Balance 15\"x3 ea 15\"x3 ea 20\"x2 ea NV NV 20\"x2 ea 20\"x2 ea 30\"x2 ea 30\"x2 ea 30\"x2 ea    Tandem Walking                     Sidestepping  3 laps @ rail  4 laps @ rail  4 laps @ rail  NV  Red TB  4 laps Red TB  4 laps  Red TB  4 laps  Red TB  4 laps GTB 3 laps @ HR GTB 4 laps @ HR    Step Up & Over    0R  X6 ea  0R  X8 ea  NV  NV NV  0R 1x10  0R 1x10 ea 1R 1x10 ea 1R 1x10 ea    Step Ups     NV 0R 1x10 0R 1x10 0R 1x10 2R 1x10 2R 1x10 ea    Step Downs     NV 0R 1x10 0R 1x10 0R 1x10 1R 1x10 1R 1x10 ea    Gait with Head Turns Up/down  Side/side  @ rail x4 laps ea Up/down  Side/side  @ rail x4 laps ea Up/down side/side @ rail x4 laps ea NV NV Up/down side/side @ rail x4 laps ea Up/down side/side x15' x2 laps ea CS/CG Up/down side/side x15' x2 laps ea CS/CG Up/down side/side x15' x2 laps ea CS/CG Up/down side/side x15' x2 laps ea CS/CG    Gaith with Head Nods              Obstacle Course                      Modalities                                                      "

## 2024-03-11 ENCOUNTER — OFFICE VISIT (OUTPATIENT)
Dept: PHYSICAL THERAPY | Facility: REHABILITATION | Age: 54
End: 2024-03-11
Payer: COMMERCIAL

## 2024-03-11 DIAGNOSIS — G82.20 PARAPARESIS OF BOTH LOWER LIMBS (HCC): ICD-10-CM

## 2024-03-11 DIAGNOSIS — M62.838 MUSCLE SPASMS OF BOTH LOWER EXTREMITIES: ICD-10-CM

## 2024-03-11 DIAGNOSIS — R29.6 FREQUENT FALLS: Primary | ICD-10-CM

## 2024-03-11 DIAGNOSIS — H81.90 VESTIBULAR DYSFUNCTION, UNSPECIFIED LATERALITY: ICD-10-CM

## 2024-03-11 PROCEDURE — 97530 THERAPEUTIC ACTIVITIES: CPT | Performed by: PHYSICAL THERAPIST

## 2024-03-11 PROCEDURE — 97112 NEUROMUSCULAR REEDUCATION: CPT | Performed by: PHYSICAL THERAPIST

## 2024-03-11 NOTE — PROGRESS NOTES
Daily Note     Today's date: 3/11/2024  Patient name: Elena Multani  : 1970  MRN: [id number]  Referring provider: Miriam Harris, *  Dx:   Encounter Diagnosis     ICD-10-CM    1. Frequent falls  R29.6       2. Muscle spasms of both lower extremities  M62.838       3. Paraparesis of both lower limbs (HCC)  G82.20       4. Vestibular dysfunction, unspecified laterality  H81.90                      Subjective: Pt reports she feels a little off today for unknown reason.    Objective: See treatment diary below.    Assessment: Pt with good tolerance to current program with appropriate challenge. Pt demonstrates good understanding and carryover with current program, gradually improving with her balance and proprioception with functional strengthening exercises. Will continue to progress program as able. Pt will benefit from continued skilled PT intervention in order to address remaining limitations and to restore maximal function.     Plan: Continue per plan of care.  Progress treatment as tolerated.       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    Chronic neck pain    Numbness and tingling in left arm    Real time reverse transcriptase PCR positive for COVID-19 virus    Dizziness and giddiness    Persistent postural-perceptual dizziness    Paraparesis of both lower limbs (HCC)    Cervical radiculopathy at C7    CNS demyelination (HCC)       Daily Treatment Diary      Assessment 3/11          3/7   Eval/Reval              FOTO              Manuals    B HF Stretching                R Hip ER PROM - propped prone                          Prescribed POC    Pt Education                HEP  "Issued/Updated                Bike  L1x5'         1x    Treadmill              Hams/gastroc strap stretch              Bridges + Hip ADD              Bridges + TB Hip ABD              TB Clamshells              Biodex Testing              Biodex LOS  Level 3  Trial:   45\", 78%    Trial 2: 49\", 79%    Trial 3:  46\", 83%          Level 3:  Trial 1:  45\", 71%    Trial 2:  37\", 71%    Trial 3:  44\", 77%    Biodex - Maze  Level 2  Trial 1: 59\", 62%  Trial 2:  57\", 84%  Trial 3:  36%,100%         Level 2  Trial 1:  53\", 88%    Trial 2:  1'06\", 92%    Trial 3:  58\", 92%    FTEO - Airex  30\"x2   CS         30\"x3    FAEC - Floor  Airex   30\"x2   CS         Airex 30\"x3    FTEO -   Airex + Head Turns L/R 30\"x2  CS         FT airex head turns 30\"x2   FTEO -   Airex +  Head Nods Up/Down 30\"x2  CS         FT  Airex nods  30\"x2   CS   Tandem Balance 30\"x2 ea         30\"x2 ea    Tandem Walking               Sidestepping  GTB 4 laps @ HR         GTB 4 laps @ HR    Step Up & Over           1R 1x10 ea    Step Ups 2R 1x10 ea         2R 1x10 ea    Step Downs 1R 1x10 ea         1R 1x10 ea    Gait with Head Turns - Up/Down & Side/Side x15' x2 laps ea CS/CG         Up/down side/side x15' x2 laps ea CS/CG    Gaith with Head Nods              Obstacle Course               Modalities                                               "

## 2024-03-13 ENCOUNTER — OFFICE VISIT (OUTPATIENT)
Age: 54
End: 2024-03-13
Payer: COMMERCIAL

## 2024-03-13 VITALS
DIASTOLIC BLOOD PRESSURE: 70 MMHG | TEMPERATURE: 97.4 F | BODY MASS INDEX: 36.54 KG/M2 | HEIGHT: 67 IN | SYSTOLIC BLOOD PRESSURE: 110 MMHG | HEART RATE: 88 BPM | WEIGHT: 232.8 LBS

## 2024-03-13 DIAGNOSIS — R09.A2 GLOBUS SENSATION: ICD-10-CM

## 2024-03-13 DIAGNOSIS — G93.2 PSEUDOTUMOR CEREBRI: ICD-10-CM

## 2024-03-13 DIAGNOSIS — D51.0 PERNICIOUS ANEMIA: ICD-10-CM

## 2024-03-13 DIAGNOSIS — R06.83 SNORING: ICD-10-CM

## 2024-03-13 DIAGNOSIS — H53.9 TRANSIENT VISION DISTURBANCE OF BOTH EYES: ICD-10-CM

## 2024-03-13 DIAGNOSIS — L40.9 PSORIASIS: ICD-10-CM

## 2024-03-13 DIAGNOSIS — R73.03 PREDIABETES: Primary | ICD-10-CM

## 2024-03-13 DIAGNOSIS — E66.9 OBESITY, CLASS II, BMI 35-39.9: ICD-10-CM

## 2024-03-13 PROCEDURE — 99244 OFF/OP CNSLTJ NEW/EST MOD 40: CPT | Performed by: PHYSICIAN ASSISTANT

## 2024-03-13 NOTE — PATIENT INSTRUCTIONS
- Discussed options of HealthyCORE-Intensive Lifestyle Intervention Program, Very Low Calorie Diet-VLCD, Conservative Program, Jose-En-Y Gastric Bypass, and Vertical Sleeve Gastrectomy and the role of weight loss medications.  - Explained the importance of making lifestyle changes first before starting anti-obesity medications.  - Patient should demonstrate lifestyle changes first before anti-obesity medication initiated.   - Patient is interested in pursuing conservative  - Initial weight loss goal of 5-10% weight loss for improved health as studies have shown this is where we see the greatest impact on improving health and decreasing risk of obesity related conditions.  - Weight loss can improve patient's co-morbid conditions and/or prevent weight-related complications.  - Stop Bang 7/8  - Labs reviewed: As below.    Not a good candidate for GLP1 RA d/t family history of thyroid cancer in mother, unknown type.  Patient already of topamax for pseudotumor cerebri  - does have hx of nephrolithiasis    General Recommendations:  Nutrition:  Eat breakfast daily.  Do not skip meals.     Food log (ie.) www.myfitnesspal.com, sparkpeople.com, loseit.com, calorieking.com, etc.    Practice mindful eating.  Be sure to set aside time to eat, eat slowly, and savor your food.    Hydration:    At least 64oz of water daily.  No sugar sweetened beverages.  No juice (eat the fruit instead).    Exercise:  Studies have shown that the ideal exercise goal is somewhere between 150 to 300 minutes of moderate intensity exercise a week.  Start with exercising 10 minutes every other day and gradually increase physical activity with a goal of at least 150 minutes of moderate intensity exercise a week, divided over at least 3 days a week.  An example of this would be exercising 30 minutes a day, 5 days a week.  Resistance training can increase muscle mass and increase our resting metabolic rate.   FULL BODY resistance training is recommended  2-3 times a week.  Do not do this on consecutive days to allow for muscle recovery.    Aim for a bare minimum 5000 steps, even on days you do not exercise.    Monitoring:   Weigh yourself daily.  If this causes undue stress, then just weigh yourself once a week.  Weigh yourself the same time of the day with the same amount of clothing on.  Preferably this should be done after waking up, before you eat, and with no clothing or minimal clothing on.    Specific Goals:  No sugary beverages. At least 64oz of water daily.  Goal protein intake of 60-80 grams per day  Gradually increase physical activity to a goal of 5 days per week for 30 minutes of MODERATE intensity PLUS 2 days per week of FULL BODY resistance training    Calorie goal:  [id number] fransisco/day (Provided with meal plan to follow).    Return visit:    SW - stress and emotional eating  Calorie counting  GI referral - difficulty swallowing, globus sensation   Sleep medicine referral - stopbang 7/8  Physical activity as tolerated  Return to clinic in 3 months  Monitor fasting glucose - can consider metformin if rising with her prediabetes. Patients wishes to try conservative measures at this time.

## 2024-03-13 NOTE — PROGRESS NOTES
Assessment/Plan:  Elena was seen today for consult.    Diagnoses and all orders for this visit:    Prediabetes    Obesity, Class II, BMI 35-39.9    Pernicious anemia  -     Ambulatory Referral to Weight Management    Pseudotumor cerebri  -     Ambulatory Referral to Weight Management    Transient vision disturbance of both eyes  -     Ambulatory Referral to Weight Management    Psoriasis  -     Ambulatory Referral to Weight Management    Globus sensation  -     Ambulatory referral to Gastroenterology; Future    Snoring  -     Ambulatory referral to Sleep Medicine; Future       - Discussed options of HealthyCORE-Intensive Lifestyle Intervention Program, Very Low Calorie Diet-VLCD, Conservative Program, Jose-En-Y Gastric Bypass, and Vertical Sleeve Gastrectomy and the role of weight loss medications.  - Explained the importance of making lifestyle changes first before starting anti-obesity medications.  - Patient should demonstrate lifestyle changes first before anti-obesity medication initiated.   - Patient is interested in pursuing conservative  - Initial weight loss goal of 5-10% weight loss for improved health as studies have shown this is where we see the greatest impact on improving health and decreasing risk of obesity related conditions.  - Weight loss can improve patient's co-morbid conditions and/or prevent weight-related complications.  - Stop Bang 7/8  - Labs reviewed: As below.    Not a good candidate for GLP1 RA d/t family history of thyroid cancer in mother, unknown type.  Patient already of topamax for pseudotumor cerebri  - does have hx of nephrolithiasis    General Recommendations:  Nutrition:  Eat breakfast daily.  Do not skip meals.     Food log (ie.) www.IndoorAtlas.com, sparkpeople.com, loseit.com, TradeCard.com, etc.    Practice mindful eating.  Be sure to set aside time to eat, eat slowly, and savor your food.    Hydration:    At least 64oz of water daily.  No sugar sweetened beverages.   No juice (eat the fruit instead).    Exercise:  Studies have shown that the ideal exercise goal is somewhere between 150 to 300 minutes of moderate intensity exercise a week.  Start with exercising 10 minutes every other day and gradually increase physical activity with a goal of at least 150 minutes of moderate intensity exercise a week, divided over at least 3 days a week.  An example of this would be exercising 30 minutes a day, 5 days a week.  Resistance training can increase muscle mass and increase our resting metabolic rate.   FULL BODY resistance training is recommended 2-3 times a week.  Do not do this on consecutive days to allow for muscle recovery.    Aim for a bare minimum 5000 steps, even on days you do not exercise.    Monitoring:   Weigh yourself daily.  If this causes undue stress, then just weigh yourself once a week.  Weigh yourself the same time of the day with the same amount of clothing on.  Preferably this should be done after waking up, before you eat, and with no clothing or minimal clothing on.    Specific Goals:  No sugary beverages. At least 64oz of water daily.  Goal protein intake of 60-80 grams per day  Gradually increase physical activity to a goal of 5 days per week for 30 minutes of MODERATE intensity PLUS 2 days per week of FULL BODY resistance training    Calorie goal:  [id number] fransisco/day (Provided with meal plan to follow).    Return visit:    SW - stress and emotional eating  Calorie counting  GI referral - difficulty swallowing, globus sensation   Sleep medicine referral - stopbang 7/8  Physical activity as tolerated  Return to clinic in 3 months  Monitor fasting glucose - can consider metformin if rising with her prediabetes. Patients wishes to try conservative measures at this time.     Total time spent reviewing chart, interviewing patient, examining patient, discussing plan, answering all questions, and documentin min.        ______________________________________________________________________        Subjective:   Chief Complaint   Patient presents with    Consult     Mwm consult       HPI: Elena Multani  is a 54 y.o. female with history of pseudotumor cerebri, dysphagia, anemia, and excess weight, here to pursue weight loss management.  Previous notes and records have been reviewed.    Managed on topamax 75mg BID and imitrex  Depression/anxiety - on citalopram  Prediabetes - hemoglobin A1c 5.8 (1/2/24)  Psoriatic arthritis - on humira    TSH WNL    Patient following with neurology for pseudotumor cerebri. Began gaining weight and started on topamax 75mg BID. Had worsening depression and decreased down to 50mg and tapering off.   History of nephrolithiasis       *emotional and stress eating  Not hungry throughout the day, then eating primarily in the evenings  Craves sugary food.   Feels like her food is not fully digesting and noticing portion  Experiencing globus sensation and difficulty swallowing on occasion. Occasionally leads to non bilious emesis.     A lot of physical restrictions d/t arthritis. Seeing physical therapy  Saw RD 2 years ago through [facility]    Contraception: hx of hysterectomy. Started hotflashes recently    HPI  Wt Readings from Last 20 Encounters:   03/13/24 106 kg (232 lb 12.8 oz)   02/14/24 108 kg (238 lb 14.4 oz)   12/11/23 104 kg (228 lb 9.6 oz)   12/07/22 105 kg (232 lb)   06/08/22 98.9 kg (218 lb)   06/09/21 97.7 kg (215 lb 6.4 oz)   12/16/20 97.1 kg (214 lb)   08/26/20 99.9 kg (220 lb 4.8 oz)   08/28/19 103 kg (227 lb)   06/19/19 104 kg (229 lb)   06/12/19 102 kg (225 lb)   06/12/19 102 kg (225 lb)   04/25/19 103 kg (226 lb)   11/07/18 96.6 kg (213 lb)   09/05/18 90.7 kg (200 lb)   12/26/17 94.8 kg (209 lb)   07/26/17 90.7 kg (200 lb)   07/11/17 92.8 kg (204 lb 8.9 oz)   04/14/17 89.8 kg (198 lb)   03/16/17 88.5 kg (195 lb)     Excess Weight:  Highest weight: 242 lbs (2023)  Lowest Weight: 136 lbs (31  yo)  Current weight: 232 lbs  Contributing factors: Poor Food Choices, Insufficient Physical Activity, and Medications  Associated symptoms and effects: comorbid conditions    Food logging:  B:  skip  S: skip  L: skip  S skip:  D (5 PM): chicken + rice + veggies (broccoli)  S: peanut butter and celery, chocolate chip cookies, ice cream    Hunger/Cravings: sweets/chocolate  Dining out: none  Hydration: Water - 3 amor jars   Exercise:  Physical therapy 2x/week  STOP-BANG Score: 7/8      Past Medical History:   Diagnosis Date    Atrophy of vagina     Avascular necrosis (HCC)     Cluster headache     Depression     Difficulty walking 2years    Easy bruisability     Endometriosis     Factor V Leiden mutation (HCC)     Fibromyalgia, primary 2yrs    HL (hearing loss) Past 8 months    Hx of migraines     Hypertension     Lichen sclerosus     Memory loss Past couple years    Migraine Migraines past 2 years    Ovarian cyst     Pelvic pain     Periodic limb movement disorder     Peripheral neuropathy     Psoriasis     Vision loss 2years     Patient denies personal and family history of pancreatitis, MEN-2 tumors. + thyroid cancer  Denies any hx of glaucoma, seizures. + kidney stones s/p cholecystectomy   Denies Hx of CAD, PAD, palpitations, arrhythmia.   Denies uncontrolled anxiety or depression, suicidal behavior or thinking , insomnia or sleep disturbance.     Past Surgical History:   Procedure Laterality Date    APPENDECTOMY       SECTION      FL LUMBAR PUNCTURE DIAGNOSTIC  2018    HYSTERECTOMY      LAPAROSCOPIC OVARIAN CYSTECTOMY       The following portions of the patient's history were reviewed and updated as appropriate: allergies, current medications, past family history, past medical history, past social history, past surgical history, and problem list.    Review Of Systems:  Review of Systems   Constitutional:  Negative for fatigue and fever.   HENT:  Positive for trouble swallowing (feels a globus  "sensation). Negative for sore throat and voice change.    Respiratory:  Positive for cough. Negative for shortness of breath.    Cardiovascular:  Negative for chest pain and palpitations.   Gastrointestinal:  Positive for diarrhea, nausea and vomiting. Negative for abdominal pain and constipation.   Endocrine: Negative for cold intolerance and heat intolerance.   Genitourinary:  Negative for difficulty urinating.   Musculoskeletal:  Negative for arthralgias and back pain.   Psychiatric/Behavioral:  Negative for suicidal ideas.         + anxiety and depression - better on lower dose of topamax. Controlled SSRI   All other systems reviewed and are negative.      Objective:  /70   Pulse 88   Temp (!) 97.4 °F (36.3 °C) (Tympanic)   Ht 5' 6.54\" (1.69 m)   Wt 106 kg (232 lb 12.8 oz)   LMP  (LMP Unknown)   BMI 36.97 kg/m²   Physical Exam  Vitals and nursing note reviewed.   Constitutional:       General: She is not in acute distress.     Appearance: Normal appearance. She is obese. She is not ill-appearing, toxic-appearing or diaphoretic.   HENT:      Head: Normocephalic and atraumatic.   Eyes:      General:         Right eye: No discharge.         Left eye: No discharge.      Conjunctiva/sclera: Conjunctivae normal.   Pulmonary:      Effort: Pulmonary effort is normal. No respiratory distress.   Musculoskeletal:      Cervical back: Normal range of motion. No rigidity.      Right lower leg: No edema.      Left lower leg: No edema.      Comments: Ambulates with cane assistance   Skin:     Coloration: Skin is not pale.      Findings: No erythema or rash.   Neurological:      General: No focal deficit present.      Mental Status: She is alert.   Psychiatric:         Mood and Affect: Mood normal.         Labs and Imaging  Recent labs and imaging have been personally reviewed.  Lab Results   Component Value Date    WBC 0-5 07/13/2017    HGB 15.1 12/22/2016    HCT 43.8 12/22/2016    MCV 92 12/22/2016     " "11/07/2018     Lab Results   Component Value Date     12/22/2016    SODIUM 142 01/02/2024    K 4.4 01/02/2024     (H) 01/02/2024    CO2 23 01/02/2024    ANIONGAP 11 11/21/2015    AGAP 8 01/02/2024    BUN 13 01/02/2024    CREATININE 0.79 01/02/2024    GLUC 93 01/02/2024    CALCIUM 9.5 01/02/2024    AST 14 01/02/2024    ALT 24 01/02/2024    ALKPHOS 73 01/02/2024    PROT 6.7 07/13/2017    TP 6.7 01/02/2024    BILITOT <0.2 12/22/2016    TBILI 0.3 01/02/2024    EGFR 89 01/02/2024     Lab Results   Component Value Date    HGBA1C 5.8 (H) 01/02/2024     Lab Results   Component Value Date    UCI2NSAAIYFK 1.310 12/22/2016    TSH 1.28 02/28/2023     No results found for: \"CHOLESTEROL\"  No results found for: \"HDL\"  Lab Results   Component Value Date    TRIG 174 (H) 07/07/2022     No results found for: \"LDLCALC\"    "

## 2024-03-14 ENCOUNTER — OFFICE VISIT (OUTPATIENT)
Dept: PHYSICAL THERAPY | Facility: REHABILITATION | Age: 54
End: 2024-03-14
Payer: COMMERCIAL

## 2024-03-14 DIAGNOSIS — H81.90 VESTIBULAR DYSFUNCTION, UNSPECIFIED LATERALITY: ICD-10-CM

## 2024-03-14 DIAGNOSIS — R29.6 FREQUENT FALLS: Primary | ICD-10-CM

## 2024-03-14 DIAGNOSIS — G82.20 PARAPARESIS OF BOTH LOWER LIMBS (HCC): ICD-10-CM

## 2024-03-14 DIAGNOSIS — M62.838 MUSCLE SPASMS OF BOTH LOWER EXTREMITIES: ICD-10-CM

## 2024-03-14 PROCEDURE — 97112 NEUROMUSCULAR REEDUCATION: CPT

## 2024-03-14 PROCEDURE — 97530 THERAPEUTIC ACTIVITIES: CPT

## 2024-03-14 NOTE — PROGRESS NOTES
Daily Note     Today's date: 3/14/2024  Patient name: Elena Multani  : 1970  MRN: [id number]  Referring provider: Miriam Harris, *  Dx:   Encounter Diagnosis     ICD-10-CM    1. Frequent falls  R29.6       2. Muscle spasms of both lower extremities  M62.838       3. Paraparesis of both lower limbs (HCC)  G82.20       4. Vestibular dysfunction, unspecified laterality  H81.90                      Subjective: pt reports with c/o increased L knee pain following last visit.      Objective: See treatment diary below      Assessment: Tolerated treatment well and without complaints. Improved balance and proprioception with balance activities. Appropriate level of fatigue post treatment. Patient demonstrated fatigue post treatment, exhibited good technique with therapeutic exercises, and would benefit from continued PT.      Plan: Continue per plan of care.  Progress treatment as tolerated.       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    Chronic neck pain    Numbness and tingling in left arm    Real time reverse transcriptase PCR positive for COVID-19 virus    Dizziness and giddiness    Persistent postural-perceptual dizziness    Paraparesis of both lower limbs (HCC)    Cervical radiculopathy at C7    CNS demyelination (HCC)       Daily Treatment Diary      Assessment 3/11 3/14         3/7   Eval/Reval              FOTO              Manuals    B HF Stretching                R Hip ER PROM - propped prone                          Prescribed POC    Pt Education                HEP Issued/Updated                Bike  L1x5' L1x5'        1x    Treadmill              Hams/gastroc strap stretch    "           Bridges + Hip ADD              Bridges + TB Hip ABD              TB Clamshells              Biodex Testing              Biodex LOS  Level 3  Trial:   45\", 78%    Trial 2: 49\", 79%    Trial 3:  46\", 83%  Level 3  Trial 1:   45\", 79%    Trial 2:  41\", 89%    Trial 3:  41\", 90%        Level 3:  Trial 1:  45\", 71%    Trial 2:  37\", 71%    Trial 3:  44\", 77%    Biodex - Maze  Level 2  Trial 1: 59\", 62%  Trial 2:  57\", 84%  Trial 3:  36%,100% Level 2  Trial 1:  55\", 98%    Trial 2:  1'06\", 82%    Trial 3:  1'02\", 92%        Level 2  Trial 1:  53\", 88%    Trial 2:  1'06\", 92%    Trial 3:  58\", 92%    FTEO - Airex  30\"x2   CS 30\"x3  CS        30\"x3    FAEC - Floor  Airex   30\"x2   CS Airex  30\"x3  CS        Airex 30\"x3    FTEO -   Airex + Head Turns L/R 30\"x2  CS 30\"x3 CS        FT airex head turns 30\"x2   FTEO -   Airex +  Head Nods Up/Down 30\"x2  CS 30\"x2  CS        FT  Airex nods  30\"x2   CS   Tandem Balance 30\"x2 ea 30\"x2 ea        30\"x2 ea    Tandem Walking               Sidestepping  GTB 4 laps @ HR GTB 4 laps @ HR        GTB 4 laps @ HR    Step Up & Over           1R 1x10 ea    Step Ups 2R 1x10 ea 2R 1x10 ea        2R 1x10 ea    Step Downs 1R 1x10 ea 1R 1x10        1R 1x10 ea    Gait with Head Turns - Up/Down & Side/Side x15' x2 laps ea CS/CG x15' x2 laps ea CS/CG        Up/down side/side x15' x2 laps ea CS/CG    Gaith with Head Nods              Obstacle Course               Modalities                                                 "

## 2024-03-19 ENCOUNTER — APPOINTMENT (OUTPATIENT)
Dept: PHYSICAL THERAPY | Facility: REHABILITATION | Age: 54
End: 2024-03-19
Payer: COMMERCIAL

## 2024-03-21 ENCOUNTER — APPOINTMENT (OUTPATIENT)
Dept: PHYSICAL THERAPY | Facility: REHABILITATION | Age: 54
End: 2024-03-21
Payer: COMMERCIAL

## 2024-03-22 DIAGNOSIS — Z00.6 ENCOUNTER FOR EXAMINATION FOR NORMAL COMPARISON OR CONTROL IN CLINICAL RESEARCH PROGRAM: ICD-10-CM

## 2024-03-25 ENCOUNTER — OFFICE VISIT (OUTPATIENT)
Dept: PHYSICAL THERAPY | Facility: REHABILITATION | Age: 54
End: 2024-03-25
Payer: COMMERCIAL

## 2024-03-25 DIAGNOSIS — R29.6 FREQUENT FALLS: Primary | ICD-10-CM

## 2024-03-25 DIAGNOSIS — M62.838 MUSCLE SPASMS OF BOTH LOWER EXTREMITIES: ICD-10-CM

## 2024-03-25 DIAGNOSIS — G82.20 PARAPARESIS OF BOTH LOWER LIMBS (HCC): ICD-10-CM

## 2024-03-25 PROCEDURE — 97110 THERAPEUTIC EXERCISES: CPT | Performed by: PHYSICAL THERAPIST

## 2024-03-25 PROCEDURE — 97112 NEUROMUSCULAR REEDUCATION: CPT | Performed by: PHYSICAL THERAPIST

## 2024-03-25 NOTE — PROGRESS NOTES
Daily Note     Today's date: 3/25/2024  Patient name: Elena Multani  : 1970  MRN: [id number]  Referring provider: Miriam Harris, *  Dx:   Encounter Diagnosis     ICD-10-CM    1. Frequent falls  R29.6       2. Muscle spasms of both lower extremities  M62.838       3. Paraparesis of both lower limbs (HCC)  G82.20                    Subjective: Pt with no new complaints since last session.    Objective: See treatment diary below.    Assessment: Pt with good tolerance to current program with appropriate challenge. Pt demonstrates good understanding and carryover with current program, gradually improving with her balance and proprioception with functional strengthening exercises. Will continue to progress program as able. Pt will benefit from continued skilled PT intervention in order to address remaining limitations and to restore maximal function.     Plan: Continue per plan of care.  Progress treatment as tolerated.         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    Chronic neck pain    Numbness and tingling in left arm    Real time reverse transcriptase PCR positive for COVID-19 virus    Dizziness and giddiness    Persistent postural-perceptual dizziness    Paraparesis of both lower limbs (HCC)    Cervical radiculopathy at C7    CNS demyelination (HCC)       Daily Treatment Diary      Assessment 3/11 3/14 3/25        3/7   Eval/Reval              FOTO              Manuals    B HF Stretching                R Hip ER PROM - propped prone                          Prescribed POC    Pt Education                HEP Issued/Updated                Bike  L1x5' L1x5' StarTrac  L4x10'       1x  "   Treadmill              Hams/gastroc strap stretch              Bridges + Hip ADD              Bridges + TB Hip ABD              TB Clamshells              Biodex Testing              Biodex LOS  Level 3  Trial:   45\", 78%    Trial 2: 49\", 79%    Trial 3:  46\", 83%  Level 3  Trial 1:   45\", 79%    Trial 2:  41\", 89%    Trial 3:  41\", 90% Level 3  Trial 1:   43\", 84%    Trial 2:  46\", 86%    Trial 3:  48\", 86%       Level 3:  Trial 1:  45\", 71%    Trial 2:  37\", 71%    Trial 3:  44\", 77%    Biodex - Maze  Level 2  Trial 1: 59\", 62%  Trial 2:  57\", 84%  Trial 3:  36%,100% Level 2  Trial 1:  55\", 98%    Trial 2:  1'06\", 82%    Trial 3:  1'02\", 92% Level 2  Trial 1:  57\", 90%    Trial 2:  51\", 74%    Trial 3:  52\", 70%       Level 2  Trial 1:  53\", 88%    Trial 2:  1'06\", 92%    Trial 3:  58\", 92%    FTEO - Airex  30\"x2   CS 30\"x3  CS 30\"x3  CS       30\"x3    FAEC - Floor  Airex   30\"x2   CS Airex  30\"x3  CS Airex  30\"x3  CS       Airex 30\"x3    FTEO -   Airex + Head Turns L/R 30\"x2  CS 30\"x3 CS 30\"x2  CS       FT airex head turns 30\"x2   FTEO -   Airex +  Head Nods Up/Down 30\"x2  CS 30\"x2  CS 30\"x2  CS       FT  Airex nods  30\"x2   CS   Tandem Balance 30\"x2 ea 30\"x2 ea 30\"x2 ea       30\"x2 ea    Tandem Walking               Sidestepping  GTB 4 laps @ HR GTB 4 laps @ HR GTB   5 laps @ HR       GTB 4 laps @ HR    Step Up & Over           1R 1x10 ea    Step Ups 2R 1x10 ea 2R 1x10 ea 2R   2x10 ea       2R 1x10 ea    Step Downs 1R 1x10 ea 1R 1x10 1R   2x10 ea       1R 1x10 ea    Gait with Head Turns - Up/Down & Side/Side x15' x2 laps ea CS/CG x15' x2 laps ea CS/CG x15' x2 laps ea CS/CG       Up/down side/side x15' x2 laps ea CS/CG    Gaith with Head Nods              Obstacle Course               Modalities                                                 "

## 2024-03-28 ENCOUNTER — OFFICE VISIT (OUTPATIENT)
Dept: PHYSICAL THERAPY | Facility: REHABILITATION | Age: 54
End: 2024-03-28
Payer: COMMERCIAL

## 2024-03-28 DIAGNOSIS — G82.20 PARAPARESIS OF BOTH LOWER LIMBS (HCC): ICD-10-CM

## 2024-03-28 DIAGNOSIS — M62.838 MUSCLE SPASMS OF BOTH LOWER EXTREMITIES: ICD-10-CM

## 2024-03-28 DIAGNOSIS — H81.90 VESTIBULAR DYSFUNCTION, UNSPECIFIED LATERALITY: ICD-10-CM

## 2024-03-28 DIAGNOSIS — R29.6 FREQUENT FALLS: Primary | ICD-10-CM

## 2024-03-28 PROCEDURE — 97112 NEUROMUSCULAR REEDUCATION: CPT

## 2024-03-28 PROCEDURE — 97110 THERAPEUTIC EXERCISES: CPT

## 2024-03-28 PROCEDURE — 97530 THERAPEUTIC ACTIVITIES: CPT

## 2024-03-28 NOTE — PROGRESS NOTES
Daily Note     Today's date: 3/28/2024  Patient name: Elena Multani  : 1970  MRN: [id number]  Referring provider: Miriam Harris, *  Dx:   Encounter Diagnosis     ICD-10-CM    1. Frequent falls  R29.6       2. Muscle spasms of both lower extremities  M62.838       3. Paraparesis of both lower limbs (HCC)  G82.20       4. Vestibular dysfunction, unspecified laterality  H81.90                      Subjective: pt reports she notices improvement with her balance. She noted most difficulty with walking through the grocery store and looking at the shelves. She noted this makes her off balance as well as with people walking past her will throw her off as well.      Objective: See treatment diary below      Assessment: Pt tolerated treatment well. Pt exhibits good challenge with current program, particularly walking activities with head movements; LOB present, but regained with good hip/ankle strategy. Patient demonstrated fatigue post treatment and exhibited good technique with therapeutic exercises      Plan: Continue per plan of care.  Progress treatment as tolerated.       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    Chronic neck pain    Numbness and tingling in left arm    Real time reverse transcriptase PCR positive for COVID-19 virus    Dizziness and giddiness    Persistent postural-perceptual dizziness    Paraparesis of both lower limbs (HCC)    Cervical radiculopathy at C7    CNS demyelination (HCC)       Daily Treatment Diary      Assessment 3/11 3/14 3/25 3/28       3/7   Eval/Reval              FOTO              Manuals    B HF Stretching                R Hip ER PROM - propped  "prone                          Prescribed POC    Pt Education                HEP Issued/Updated                Bike  L1x5' L1x5' StarTrac  L4x10' L1x5'      1x    Treadmill              Hams/gastroc strap stretch              Bridges + Hip ADD              Bridges + TB Hip ABD              TB Clamshells              Biodex Testing              Biodex LOS  Level 3  Trial:   45\", 78%    Trial 2: 49\", 79%    Trial 3:  46\", 83%  Level 3  Trial 1:   45\", 79%    Trial 2:  41\", 89%    Trial 3:  41\", 90% Level 3  Trial 1:   43\", 84%    Trial 2:  46\", 86%    Trial 3:  48\", 86% Level 3  Trial 1:   41\", 82%    Trial 2:  45\", 88%    Trial 3:  46\", 89%      Level 3:  Trial 1:  45\", 71%    Trial 2:  37\", 71%    Trial 3:  44\", 77%    Biodex - Maze  Level 2  Trial 1: 59\", 62%  Trial 2:  57\", 84%  Trial 3:  36%,100% Level 2  Trial 1:  55\", 98%    Trial 2:  1'06\", 82%    Trial 3:  1'02\", 92% Level 2  Trial 1:  57\", 90%    Trial 2:  51\", 74%    Trial 3:  52\", 70% Level 2  Trial 1:  1'02\", 84%    Trial 2:  57\", 92%    Trial 3:  55\", 90%      Level 2  Trial 1:  53\", 88%    Trial 2:  1'06\", 92%    Trial 3:  58\", 92%    FTEO - Airex  30\"x2   CS 30\"x3  CS 30\"x3  CS 30\"x3  CS      30\"x3    FAEC - Floor  Airex   30\"x2   CS Airex  30\"x3  CS Airex  30\"x3  CS Airex  30\"x3  CS      Airex 30\"x3    FTEO -   Airex + Head Turns L/R 30\"x2  CS 30\"x3 CS 30\"x2  CS 30\"x2  CS      FT airex head turns 30\"x2   FTEO -   Airex +  Head Nods Up/Down 30\"x2  CS 30\"x2  CS 30\"x2  CS 30\"x2  CS      FT  Airex nods  30\"x2   CS   Tandem Balance 30\"x2 ea 30\"x2 ea 30\"x2 ea 30\"x2 ea  CS      30\"x2 ea    Tandem Walking               Sidestepping  GTB 4 laps @ HR GTB 4 laps @ HR GTB   5 laps @ HR BTB 3 laps  @ HR      GTB 4 laps @ HR    Step Up & Over           1R 1x10 ea    Step Ups 2R 1x10 ea 2R 1x10 ea 2R   2x10 ea 2R   2x10 ea      2R 1x10 ea    Step Downs 1R 1x10 ea 1R 1x10 1R   2x10 ea 1R   2x10 ea      1R 1x10 ea    Gait with Head Turns - Up/Down & Side/Side x15' x2 " laps ea CS/CG x15' x2 laps ea CS/CG x15' x2 laps ea CS/CG x15' x2 laps ea CS/CG      Up/down side/side x15' x2 laps ea CS/CG    Gaith with Head Nods              Obstacle Course               Modalities

## 2024-04-02 ENCOUNTER — CLINICAL SUPPORT (OUTPATIENT)
Age: 54
End: 2024-04-02

## 2024-04-02 VITALS — BODY MASS INDEX: 37.29 KG/M2 | WEIGHT: 234.8 LBS

## 2024-04-02 DIAGNOSIS — E66.9 OBESITY, CLASS II, BMI 35-39.9: Primary | ICD-10-CM

## 2024-04-02 PROCEDURE — RECHECK: Performed by: SOCIAL WORKER

## 2024-04-02 NOTE — PROGRESS NOTES
Patient presents for 60 minute Behavioral Health Evaluation as a part of Medical Weight Management Program, current weight 234.8      Eating Behaviors/Food choices: skips meals until dinner than follows with sweets.  Discussed meal planning for self-care.    In Social situations    Activity/Exercise: uses a cane and has balance issues,     Rest and sleep: poor sleeper, coffee is an issue, pain related wakefulness,     Mental health/Coping Behaviors:  Self Care nurturing herself through food, existential fundamental    Reviewed and discussed  Patient educated and handouts provided. x  Adequate hydration x  Exercise  Meal planning and preparation x  Lifestyle changes  Possible problems with poor eating habits  Practice mindful eating.  x  Setting aside time to eat slowly, and savor food x    Action Plan: Will change way she makes coffee in the morning, she wants to meal plan and get help with cooking, self talk to the mirror, find a PCP.       Next appointment TBD

## 2024-04-03 ENCOUNTER — OFFICE VISIT (OUTPATIENT)
Dept: PHYSICAL THERAPY | Facility: REHABILITATION | Age: 54
End: 2024-04-03
Payer: COMMERCIAL

## 2024-04-03 DIAGNOSIS — M62.838 MUSCLE SPASMS OF BOTH LOWER EXTREMITIES: ICD-10-CM

## 2024-04-03 DIAGNOSIS — H81.90 VESTIBULAR DYSFUNCTION, UNSPECIFIED LATERALITY: ICD-10-CM

## 2024-04-03 DIAGNOSIS — G82.20 PARAPARESIS OF BOTH LOWER LIMBS (HCC): ICD-10-CM

## 2024-04-03 DIAGNOSIS — R29.6 FREQUENT FALLS: Primary | ICD-10-CM

## 2024-04-03 PROCEDURE — 97530 THERAPEUTIC ACTIVITIES: CPT

## 2024-04-03 PROCEDURE — 97112 NEUROMUSCULAR REEDUCATION: CPT

## 2024-04-03 PROCEDURE — 97110 THERAPEUTIC EXERCISES: CPT

## 2024-04-03 NOTE — PROGRESS NOTES
Daily Note     Today's date: 4/3/2024  Patient name: Elena Multani  : 1970  MRN: [id number]  Referring provider: Miriam Harris, *  Dx:   Encounter Diagnosis     ICD-10-CM    1. Frequent falls  R29.6       2. Muscle spasms of both lower extremities  M62.838       3. Paraparesis of both lower limbs (HCC)  G82.20       4. Vestibular dysfunction, unspecified laterality  H81.90                      Subjective: pt reports insidious onset of increased neck pain with inability to elevate/use L UE. She noted she can use her arm now, but still hard to lift her arm. Denied cardiac or stroke s/s.      Objective: See treatment diary below      Assessment: Pt tolerated treatment well. Pt exhibits greater challenge with Biodex activities. Most difficulty with posterior weight shifting on Biodex. Patient demonstrated fatigue post treatment, exhibited good technique with therapeutic exercises, and would benefit from continued PT      Plan: Continue per plan of care.  Progress treatment as tolerated.       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    Chronic neck pain    Numbness and tingling in left arm    Real time reverse transcriptase PCR positive for COVID-19 virus    Dizziness and giddiness    Persistent postural-perceptual dizziness    Paraparesis of both lower limbs (HCC)    Cervical radiculopathy at C7    CNS demyelination (HCC)       Daily Treatment Diary      Assessment 3/11 3/14 3/25 3/28 4/3      3/7   Eval/Reval              FOTO              Manuals    B HF Stretching                R Hip ER PROM - propped prone                          Prescribed POC    Pt Education                 "HEP Issued/Updated                Bike  L1x5' L1x5' StarTrac  L4x10' L1x5' L1x5'     1x    Treadmill              Hams/gastroc strap stretch              Bridges + Hip ADD              Bridges + TB Hip ABD              TB Clamshells              Biodex Testing              Biodex LOS  Level 3  Trial:   45\", 78%    Trial 2: 49\", 79%    Trial 3:  46\", 83%  Level 3  Trial 1:   45\", 79%    Trial 2:  41\", 89%    Trial 3:  41\", 90% Level 3  Trial 1:   43\", 84%    Trial 2:  46\", 86%    Trial 3:  48\", 86% Level 3  Trial 1:   41\", 82%    Trial 2:  45\", 88%    Trial 3:  46\", 89% Level 3  Trial 1:   48\", 89%    Trial 2:  41\", 94%    Trial 3:  42\", 89%     Level 3:  Trial 1:  45\", 71%    Trial 2:  37\", 71%    Trial 3:  44\", 77%    Biodex - Maze  Level 2  Trial 1: 59\", 62%  Trial 2:  57\", 84%  Trial 3:  36%,100% Level 2  Trial 1:  55\", 98%    Trial 2:  1'06\", 82%    Trial 3:  1'02\", 92% Level 2  Trial 1:  57\", 90%    Trial 2:  51\", 74%    Trial 3:  52\", 70% Level 2  Trial 1:  1'02\", 84%    Trial 2:  57\", 92%    Trial 3:  55\", 90% Level 3  Trial 1:   52\", 72%    Trial 2:  58\", 92%    Trial 3:  55\", 94%     Level 2  Trial 1:  53\", 88%    Trial 2:  1'06\", 92%    Trial 3:  58\", 92%    FTEO - Airex  30\"x2   CS 30\"x3  CS 30\"x3  CS 30\"x3  CS 30\"x3  CS     30\"x3    FAEC - Floor  Airex   30\"x2   CS Airex  30\"x3  CS Airex  30\"x3  CS Airex  30\"x3  CS Airex  30\"x3  CS     Airex 30\"x3    FTEO -   Airex + Head Turns L/R 30\"x2  CS 30\"x3 CS 30\"x2  CS 30\"x2  CS np     FT airex head turns 30\"x2   FTEO -   Airex +  Head Nods Up/Down 30\"x2  CS 30\"x2  CS 30\"x2  CS 30\"x2  CS np     FT  Airex nods  30\"x2   CS   Tandem Balance 30\"x2 ea 30\"x2 ea 30\"x2 ea 30\"x2 ea  CS 30\"x2 ea  CS     30\"x2 ea    Tandem Walking               Sidestepping  GTB 4 laps @ HR GTB 4 laps @ HR GTB   5 laps @ HR BTB 3 laps  @ HR BTB 3 laps  @ HR     GTB 4 laps @ HR    Step Up & Over           1R 1x10 ea    Step Ups 2R 1x10 ea 2R 1x10 ea 2R   2x10 ea 2R   2x10 ea 2R   2x10 ea     " 2R 1x10 ea    Step Downs 1R 1x10 ea 1R 1x10 1R   2x10 ea 1R   2x10 ea 1R 2x10 ea     1R 1x10 ea    Gait with Head Turns - Up/Down & Side/Side x15' x2 laps ea CS/CG x15' x2 laps ea CS/CG x15' x2 laps ea CS/CG x15' x2 laps ea CS/CG np     Up/down side/side x15' x2 laps ea CS/CG    Gaith with Head Nods              Obstacle Course               Modalities

## 2024-04-05 ENCOUNTER — OFFICE VISIT (OUTPATIENT)
Dept: PHYSICAL THERAPY | Facility: REHABILITATION | Age: 54
End: 2024-04-05
Payer: COMMERCIAL

## 2024-04-05 DIAGNOSIS — H81.90 VESTIBULAR DYSFUNCTION, UNSPECIFIED LATERALITY: ICD-10-CM

## 2024-04-05 DIAGNOSIS — R29.6 FREQUENT FALLS: Primary | ICD-10-CM

## 2024-04-05 DIAGNOSIS — G82.20 PARAPARESIS OF BOTH LOWER LIMBS (HCC): ICD-10-CM

## 2024-04-05 DIAGNOSIS — M62.838 MUSCLE SPASMS OF BOTH LOWER EXTREMITIES: ICD-10-CM

## 2024-04-05 PROCEDURE — 97530 THERAPEUTIC ACTIVITIES: CPT

## 2024-04-05 PROCEDURE — 97112 NEUROMUSCULAR REEDUCATION: CPT

## 2024-04-05 PROCEDURE — 97110 THERAPEUTIC EXERCISES: CPT

## 2024-04-05 NOTE — PROGRESS NOTES
Daily Note     Today's date: 2024  Patient name: Elena Multani  : 1970  MRN: [id number]  Referring provider: Miriam Harris, *  Dx:   Encounter Diagnosis     ICD-10-CM    1. Frequent falls  R29.6       2. Muscle spasms of both lower extremities  M62.838       3. Paraparesis of both lower limbs (HCC)  G82.20       4. Vestibular dysfunction, unspecified laterality  H81.90                      Subjective: pt reports she continues with L sided neck pain with heaviness in L UE. She noted will be undergoing a CT scan in the near future.      Objective: See treatment diary below      Assessment: Tolerated progressions and treatment well. Challenged with current program Patient demonstrated fatigue post treatment, exhibited good technique with therapeutic exercises, and would benefit from continued PT      Plan: Continue per plan of care.  Progress treatment as tolerated.       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    Chronic neck pain    Numbness and tingling in left arm    Real time reverse transcriptase PCR positive for COVID-19 virus    Dizziness and giddiness    Persistent postural-perceptual dizziness    Paraparesis of both lower limbs (HCC)    Cervical radiculopathy at C7    CNS demyelination (HCC)       Daily Treatment Diary      Assessment 3/11 3/14 3/25 3/28 4/3 4/5     3/7   Eval/Reval              FOTO              Manuals    B HF Stretching                R Hip ER PROM - propped prone                          Prescribed POC    Pt Education                HEP Issued/Updated                Bike  L1x5' L1x5' StarTrac  L4x10' L1x5' L1x5' L1x6'    1x    Treadmill             "  Hams/gastroc strap stretch              Bridges + Hip ADD              Bridges + TB Hip ABD              TB Clamshells              Biodex Testing              Biodex LOS  Level 3  Trial:   45\", 78%    Trial 2: 49\", 79%    Trial 3:  46\", 83%  Level 3  Trial 1:   45\", 79%    Trial 2:  41\", 89%    Trial 3:  41\", 90% Level 3  Trial 1:   43\", 84%    Trial 2:  46\", 86%    Trial 3:  48\", 86% Level 3  Trial 1:   41\", 82%    Trial 2:  45\", 88%    Trial 3:  46\", 89% Level 3  Trial 1:   48\", 89%    Trial 2:  41\", 94%    Trial 3:  42\", 89% Level 3  Trial 1:   43\", 90%    Trial 2:  46\", 86%    Trial 3:  44\", 89%    Level 3:  Trial 1:  45\", 71%    Trial 2:  37\", 71%    Trial 3:  44\", 77%    Biodex - Maze  Level 2  Trial 1: 59\", 62%  Trial 2:  57\", 84%  Trial 3:  36%,100% Level 2  Trial 1:  55\", 98%    Trial 2:  1'06\", 82%    Trial 3:  1'02\", 92% Level 2  Trial 1:  57\", 90%    Trial 2:  51\", 74%    Trial 3:  52\", 70% Level 2  Trial 1:  1'02\", 84%    Trial 2:  57\", 92%    Trial 3:  55\", 90% Level 2  Trial 1:   52\", 72%    Trial 2:  58\", 92%    Trial 3:  55\", 94% Level   Trial 1:   57\", 78%    Trial 2:  1',01\", 98%    Trial 3:  58\", 92%    Level 2  Trial 1:  53\", 88%    Trial 2:  1'06\", 92%    Trial 3:  58\", 92%    FTEO - Airex  30\"x2   CS 30\"x3  CS 30\"x3  CS 30\"x3  CS 30\"x3  CS 30\"x3  CS    30\"x3    FAEC - Floor  Airex   30\"x2   CS Airex  30\"x3  CS Airex  30\"x3  CS Airex  30\"x3  CS Airex  30\"x3  CS Airex  30\"x3  CS    Airex 30\"x3    FTEO -   Airex + Head Turns L/R 30\"x2  CS 30\"x3 CS 30\"x2  CS 30\"x2  CS np np    FT airex head turns 30\"x2   FTEO -   Airex +  Head Nods Up/Down 30\"x2  CS 30\"x2  CS 30\"x2  CS 30\"x2  CS np np    FT  Airex nods  30\"x2   CS   Tandem Balance 30\"x2 ea 30\"x2 ea 30\"x2 ea 30\"x2 ea  CS 30\"x2 ea  CS 30\"x2 ea  CS    30\"x2 ea    Tandem Walking               Sidestepping  GTB 4 laps @ HR GTB 4 laps @ HR GTB   5 laps @ HR BTB 3 laps  @ HR BTB 3 laps  @ HR BTB 3 laps  @ HR    GTB 4 laps @ HR    Step Up & Over        "    1R 1x10 ea    Step Ups 2R 1x10 ea 2R 1x10 ea 2R   2x10 ea 2R   2x10 ea 2R   2x10 ea 2R   2x10 ea    2R 1x10 ea    Step Downs 1R 1x10 ea 1R 1x10 1R   2x10 ea 1R   2x10 ea 1R 2x10 ea 1R 2x10 ea    1R 1x10 ea    Gait with Head Turns - Up/Down & Side/Side x15' x2 laps ea CS/CG x15' x2 laps ea CS/CG x15' x2 laps ea CS/CG x15' x2 laps ea CS/CG np np    Up/down side/side x15' x2 laps ea CS/CG    Hurdles - fwd/lat      Yellow  X3 laps ea        Obstacle Course       NV        Modalities

## 2024-04-10 ENCOUNTER — OFFICE VISIT (OUTPATIENT)
Dept: PHYSICAL THERAPY | Facility: REHABILITATION | Age: 54
End: 2024-04-10
Payer: COMMERCIAL

## 2024-04-10 DIAGNOSIS — H81.90 VESTIBULAR DYSFUNCTION, UNSPECIFIED LATERALITY: ICD-10-CM

## 2024-04-10 DIAGNOSIS — M62.838 MUSCLE SPASMS OF BOTH LOWER EXTREMITIES: ICD-10-CM

## 2024-04-10 DIAGNOSIS — R29.6 FREQUENT FALLS: Primary | ICD-10-CM

## 2024-04-10 DIAGNOSIS — G82.20 PARAPARESIS OF BOTH LOWER LIMBS (HCC): ICD-10-CM

## 2024-04-10 PROCEDURE — 97530 THERAPEUTIC ACTIVITIES: CPT

## 2024-04-10 PROCEDURE — 97112 NEUROMUSCULAR REEDUCATION: CPT

## 2024-04-10 PROCEDURE — 97110 THERAPEUTIC EXERCISES: CPT

## 2024-04-10 NOTE — PROGRESS NOTES
Daily Note     Today's date: 4/10/2024  Patient name: Elena Multani  : 1970  MRN: [id number]  Referring provider: Miriam Harris, *  Dx:   Encounter Diagnosis     ICD-10-CM    1. Frequent falls  R29.6       2. Muscle spasms of both lower extremities  M62.838       3. Paraparesis of both lower limbs (HCC)  G82.20       4. Vestibular dysfunction, unspecified laterality  H81.90                      Subjective: pt offered no complaints prior to beginning today.       Objective: See treatment diary below      Assessment: Pt tolerated treatment well. Good response and challenge with progressions. Patient demonstrated fatigue post treatment, exhibited good technique with therapeutic exercises, and would benefit from continued PT      Plan: Continue per plan of care.  Progress treatment as tolerated.       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    Chronic neck pain    Numbness and tingling in left arm    Real time reverse transcriptase PCR positive for COVID-19 virus    Dizziness and giddiness    Persistent postural-perceptual dizziness    Paraparesis of both lower limbs (HCC)    Cervical radiculopathy at C7    CNS demyelination (HCC)       Daily Treatment Diary      Assessment 3/11 3/14 3/25 3/28 4/3 4/5 4/10    3/7   Eval/Reval              FOTO              Manuals    B HF Stretching                R Hip ER PROM - propped prone                          Prescribed POC    Pt Education                HEP Issued/Updated                Bike  L1x5' L1x5' StarTrac  L4x10' L1x5' L1x5' L1x6' L1x10'   1x    Treadmill              Hams/gastroc strap stretch              Bridges + Hip ADD          "     Bridges + TB Hip ABD              TB Clamshells              Biodex Testing              Biodex LOS  Level 3  Trial:   45\", 78%    Trial 2: 49\", 79%    Trial 3:  46\", 83%  Level 3  Trial 1:   45\", 79%    Trial 2:  41\", 89%    Trial 3:  41\", 90% Level 3  Trial 1:   43\", 84%    Trial 2:  46\", 86%    Trial 3:  48\", 86% Level 3  Trial 1:   41\", 82%    Trial 2:  45\", 88%    Trial 3:  46\", 89% Level 3  Trial 1:   48\", 89%    Trial 2:  41\", 94%    Trial 3:  42\", 89% Level 3  Trial 1:   43\", 90%    Trial 2:  46\", 86%    Trial 3:  44\", 89% Level 3  Foam  Trial 1:   50\", 68%    Trial 2:  52\", 68%   Level 3:  Trial 1:  45\", 71%    Trial 2:  37\", 71%    Trial 3:  44\", 77%    Biodex - Maze  Level 2  Trial 1: 59\", 62%  Trial 2:  57\", 84%  Trial 3:  36%,100% Level 2  Trial 1:  55\", 98%    Trial 2:  1'06\", 82%    Trial 3:  1'02\", 92% Level 2  Trial 1:  57\", 90%    Trial 2:  51\", 74%    Trial 3:  52\", 70% Level 2  Trial 1:  1'02\", 84%    Trial 2:  57\", 92%    Trial 3:  55\", 90% Level 2  Trial 1:   52\", 72%    Trial 2:  58\", 92%    Trial 3:  55\", 94% Level   Trial 1:   57\", 78%    Trial 2:  1',01\", 98%    Trial 3:  58\", 92% Level 3  Foam  Trial 1:   57\", 84%    Trial 2:  1',03\", 84%   Level 2  Trial 1:  53\", 88%    Trial 2:  1'06\", 92%    Trial 3:  58\", 92%    FTEO - Airex  30\"x2   CS 30\"x3  CS 30\"x3  CS 30\"x3  CS 30\"x3  CS 30\"x3  CS 30\"x3  CS   30\"x3    FAEC - Floor  Airex   30\"x2   CS Airex  30\"x3  CS Airex  30\"x3  CS Airex  30\"x3  CS Airex  30\"x3  CS Airex  30\"x3  CS Airex  30\"x3  CS   Airex 30\"x3    FTEO -   Airex + Head Turns L/R 30\"x2  CS 30\"x3 CS 30\"x2  CS 30\"x2  CS np np    FT airex head turns 30\"x2   FTEO -   Airex +  Head Nods Up/Down 30\"x2  CS 30\"x2  CS 30\"x2  CS 30\"x2  CS np np    FT  Airex nods  30\"x2   CS   Tandem Balance 30\"x2 ea 30\"x2 ea 30\"x2 ea 30\"x2 ea  CS 30\"x2 ea  CS 30\"x2 ea  CS 30\"x2 ea  CS   30\"x2 ea    Tandem Walking               Sidestepping  GTB 4 laps @ HR GTB 4 laps @ HR GTB   5 laps @ HR BTB 3 " laps  @ HR BTB 3 laps  @ HR BTB 3 laps  @ HR BTB 4 laps  @HR   GTB 4 laps @ HR    Step Up & Over           1R 1x10 ea    Step Ups 2R 1x10 ea 2R 1x10 ea 2R   2x10 ea 2R   2x10 ea 2R   2x10 ea 2R   2x10 ea 2R   2x10 ea   2R 1x10 ea    Step Downs 1R 1x10 ea 1R 1x10 1R   2x10 ea 1R   2x10 ea 1R 2x10 ea 1R 2x10 ea 2R 1x10 ea   1R 1x10 ea    Gait with Head Turns - Up/Down & Side/Side x15' x2 laps ea CS/CG x15' x2 laps ea CS/CG x15' x2 laps ea CS/CG x15' x2 laps ea CS/CG np np    Up/down side/side x15' x2 laps ea CS/CG    Hurdles - fwd/lat      Yellow  X3 laps ea Yellow  x3 laps ea       Obstacle Course       NV x2 laps       Modalities

## 2024-04-12 ENCOUNTER — APPOINTMENT (OUTPATIENT)
Dept: PHYSICAL THERAPY | Facility: REHABILITATION | Age: 54
End: 2024-04-12
Payer: COMMERCIAL

## 2024-04-17 ENCOUNTER — EVALUATION (OUTPATIENT)
Dept: PHYSICAL THERAPY | Facility: REHABILITATION | Age: 54
End: 2024-04-17
Payer: COMMERCIAL

## 2024-04-17 DIAGNOSIS — M62.838 MUSCLE SPASMS OF BOTH LOWER EXTREMITIES: ICD-10-CM

## 2024-04-17 DIAGNOSIS — G82.20 PARAPARESIS OF BOTH LOWER LIMBS (HCC): ICD-10-CM

## 2024-04-17 DIAGNOSIS — R29.6 FREQUENT FALLS: Primary | ICD-10-CM

## 2024-04-17 PROCEDURE — 97112 NEUROMUSCULAR REEDUCATION: CPT | Performed by: PHYSICAL THERAPIST

## 2024-04-17 PROCEDURE — 97164 PT RE-EVAL EST PLAN CARE: CPT | Performed by: PHYSICAL THERAPIST

## 2024-04-17 NOTE — PROGRESS NOTES
PT Re-Evaluation     Today's date: 2024  Patient name: Elena Multani  : 1970  MRN: [id number]  Referring provider: Miriam Harris, *  Dx:   Encounter Diagnosis     ICD-10-CM    1. Frequent falls  R29.6       2. Muscle spasms of both lower extremities  M62.838       3. Paraparesis of both lower limbs (HCC)  G82.20                      Assessment  Assessment details: Pt is a pleasant 53 y.o. female who has been consistently attending outpatient physical therapy with muscle spasms of both lower extremities, paraparesis of both lower limbs and frequent falls with complex past medical history. Subjectively, pt reports overall improvement in her balance and tolerance to ADLs within her home as well as decreased pain. Objectively, she demonstrates improvement in bilateral lower extremity and core strength, balance, gait symmetry/safety and tolerance to activity.  She continues with deficits in these areas as well as decreased right hip range of motion, decreased bilateral lower extremity flexibility. Pt will benefit from continued skilled PT intervention in order to address her remaining limitations and to restore maximal function.     Impairments: abnormal coordination, abnormal gait, abnormal or restricted ROM, activity intolerance, impaired balance, impaired physical strength, lacks appropriate home exercise program, pain with function and poor posture   Understanding of Dx/Px/POC: good   Prognosis: good    Goals  Short-Term Goals (4 weeks)   1. Patient will decrease worst rating of pain by 25% to improve quality of life.  2. Patient will increase strength by 1/2 MMT to improve quality of life with improved efficiency of daily activities.  3. Patient will improve ROM by 25% indicating improved mobility of affected area.    Long-Term Goals (8 weeks)   1. Patient will decrease pain by 50% at worst in comparison to IE indicating significant reduction in pain and improved quality of life.  2. Patient  will demonstrate strength WFL compared to IE levels indicating ability to independently manage pain symptoms to accomplish daily activities.   3. Patient will be independent with HEP with good form accomplished.      Plan  Patient would benefit from: PT eval and skilled PT  Planned modality interventions: cryotherapy and thermotherapy: hydrocollator packs  Planned therapy interventions: IADL retraining, body mechanics training, flexibility, functional ROM exercises, home exercise program, neuromuscular re-education, manual therapy, postural training, strengthening, stretching, therapeutic activities, therapeutic exercise and joint mobilization  Frequency: 2x week  Duration in visits: 20  Duration in weeks: 12  Treatment plan discussed with: patient      Subjective Evaluation    History of Present Illness  Mechanism of injury: 4/17/24 Re-Evaluation:  Pt has been fairly consistently attending PT  for chronic progressive bilateral LE weakness, paraparesis and frequent falls with complex PMH. She reports she feels decreased pain and overall improvement in her balance and ability to perform ADLs within her home. She reports she has been trying to walk more without SPC for shorter distances.    Pain Location: bilateral LE aching, tingling, vibrating    Pain Type: bilateral anterior thigh    Pain Intensity:  Current: 2  Best: 2  Worst: 5    Pt reports increased pain and/or difficulty with: sit-stand transfers, immediate standing balance, walking, stairs.    Pt reports decreased pain with: rest, medication          Initial Evaluation:  Pt is a 53 year-old right-handed female with complex PMH presenting to PT with 8 year history of progressive bilateral LE weakness, paraparesis and frequent falls. She reports she began having issues in 2015. She reports she was initially diagnosed with RA around 2012. She reports she had C4-C6 ACDF performed by Dr. Conde in 2012, left TSR in 2009 and 2021, both performed by Dr. Watson at  [facility]. She reports she receives bi-weekly Humera injections. She is unable to take NSAIDS due to her medical history. Pt follows with pain management and neurology. Pt reports she has had 3 significant falls in the past year, her most recent fall was on 4/28/23, in which she sustained meniscal tear with MFC fracture, underwent surgery for this on 9/29/23. Pt also underwent left tympanostomy tube replacement 1/12/23 performed by Dr. Campos.    Pt reports her B LE weakness has progressively worsened over the past 2 months, as well as an aching/vibrating sensation in both of her legs.    She reports she has the sensation that she cannot feel where her legs are when she is walking and going up/down stairs.     Pt is on disability.    Pain Location: bilateral LE aching, tingling, vibrating    Pain Type: bilateral anterior thigh    Pain Intensity:  Current: 2  Best: 2  Worst: 8    Pt reports increased pain and/or difficulty with: sit-stand transfers, immediate standing balance, walking, stairs.    Pt reports decreased pain with: rest, medication            Recurrent probem    Quality of life: good    Patient Goals  Patient goals for therapy: increased strength, independence with ADLs/IADLs, improved balance, decreased pain and increased motion          Objective     Concurrent Complaints  Negative for night pain, disturbed sleep, bladder dysfunction, bowel dysfunction and saddle (S4) numbness    Neurological Testing     Sensation     Lumbar   Left   Intact: light touch    Right   Intact: light touch    Reflexes   Left   Patellar (L4): normal (2+)  Achilles (S1): normal (2+)    Right   Patellar (L4): normal (2+)  Achilles (S1): normal (2+)    Active Range of Motion     Lumbar   Flexion:  WFL  Extension:  Restriction level: minimal  Left lateral flexion:  WFL  Right lateral flexion:  WFL  Left rotation:  WFL  Right rotation:  WFL  Left Hip   Flexion: WFL  Extension: 0 degrees   Abduction: WFL  External  rotation (prone): WFL  Internal rotation (prone): WFL    Right Hip   Flexion: WFL  Extension: 0 degrees   Abduction: WFL  External rotation (prone): 15 degrees   Internal rotation (prone): 10 degrees     Strength/Myotome Testing     Left Hip   Planes of Motion   Flexion: 4+  Extension: 4  Abduction: 4  Adduction: 4+  External rotation: 4  Internal rotation: 4    Right Hip   Planes of Motion   Flexion: 4+  Extension: 4  Abduction: 4  Adduction: 4+  External rotation: 4  Internal rotation: 4    Left Knee   Flexion: 5  Extension: 5    Right Knee   Flexion: 5  Extension: 5    Left Ankle/Foot   Dorsiflexion: 5  Plantar flexion: 5    Right Ankle/Foot   Dorsiflexion: 5  Plantar flexion: 5    Tests     Left Hip   Modified Jorge Alberto: Positive.   90/90 SLR: Positive.     Right Hip   Modified Jorge Alberto: Positive.   90/90 SLR: Positive.       Biodex Balance Assessment  Direction Control 12/26/23 - Level 1 2/12/24 - Level 2 4/17/24 - Level 3 Goal   Overall 39 88 77 85   Forward 46 90 76 85   Backward 54 79 67 85   Left  43 95 91 85   Right  48 93 74 85   Forward/Left 38 95 80 85   Forward/Right 41 81 82 85   Backward/Left 31 87 86 85   Backward/Right 49 94 76 85          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    Chronic neck pain    Numbness and tingling in left arm    Real time reverse transcriptase PCR positive for COVID-19 virus    Dizziness and giddiness    Persistent postural-perceptual dizziness    Paraparesis of both lower limbs (HCC)    Cervical radiculopathy at C7    CNS demyelination (HCC)    Vestibular dysfunction    Transient vision disturbance of both eyes    Pseudotumor cerebri             Daily  "Treatment Diary      Assessment 3/11 3/14 3/25 3/28 4/3 4/5 4/10 4/17   3/7   Eval/Revdarryl FLAHERTY      FOTO              Manuals    B HF Stretching                R Hip ER PROM - propped prone                          Prescribed POC    Pt Education                HEP Issued/Updated                Bike  L1x5' L1x5' StarTrac  L4x10' L1x5' L1x5' L1x6' L1x10'   1x    Treadmill              Hams/gastroc strap stretch              Bridges + Hip ADD              Bridges + TB Hip ABD              TB Clamshells              Biodex Testing              Biodex LOS  Level 3  Trial:   45\", 78%    Trial 2: 49\", 79%    Trial 3:  46\", 83%  Level 3  Trial 1:   45\", 79%    Trial 2:  41\", 89%    Trial 3:  41\", 90% Level 3  Trial 1:   43\", 84%    Trial 2:  46\", 86%    Trial 3:  48\", 86% Level 3  Trial 1:   41\", 82%    Trial 2:  45\", 88%    Trial 3:  46\", 89% Level 3  Trial 1:   48\", 89%    Trial 2:  41\", 94%    Trial 3:  42\", 89% Level 3  Trial 1:   43\", 90%    Trial 2:  46\", 86%    Trial 3:  44\", 89% Level 3  Foam  Trial 1:   50\", 68%    Trial 2:  52\", 68% See L3 LOS Testing Above Progress to Dynamic Surface Level 3:  Trial 1:  45\", 71%    Trial 2:  37\", 71%    Trial 3:  44\", 77%    Biodex - Maze  Level 2  Trial 1: 59\", 62%  Trial 2:  57\", 84%  Trial 3:  36%,100% Level 2  Trial 1:  55\", 98%    Trial 2:  1'06\", 82%    Trial 3:  1'02\", 92% Level 2  Trial 1:  57\", 90%    Trial 2:  51\", 74%    Trial 3:  52\", 70% Level 2  Trial 1:  1'02\", 84%    Trial 2:  57\", 92%    Trial 3:  55\", 90% Level 2  Trial 1:   52\", 72%    Trial 2:  58\", 92%    Trial 3:  55\", 94% Level   Trial 1:   57\", 78%    Trial 2:  1',01\", 98%    Trial 3:  58\", 92% Level 3  Foam  Trial 1:   57\", 84%    Trial 2:  1',03\", 84%  Progress to Dynamic Surface Level 2  Trial 1:  53\", 88%    Trial 2:  1'06\", 92%    Trial 3:  58\", 92%    FTEO - Airex  30\"x2   CS 30\"x3  CS 30\"x3  CS 30\"x3  CS 30\"x3  CS 30\"x3  CS 30\"x3  CS   30\"x3    FAEC - Floor  Airex   30\"x2   CS Airex  30\"x3  CS " "Airex  30\"x3  CS Airex  30\"x3  CS Airex  30\"x3  CS Airex  30\"x3  CS Airex  30\"x3  CS   Airex 30\"x3    FTEO -   Airex + Head Turns L/R 30\"x2  CS 30\"x3 CS 30\"x2  CS 30\"x2  CS np np    FT airex head turns 30\"x2   FTEO -   Airex +  Head Nods Up/Down 30\"x2  CS 30\"x2  CS 30\"x2  CS 30\"x2  CS np np    FT  Airex nods  30\"x2   CS   Tandem Balance 30\"x2 ea 30\"x2 ea 30\"x2 ea 30\"x2 ea  CS 30\"x2 ea  CS 30\"x2 ea  CS 30\"x2 ea  CS   30\"x2 ea    Tandem Walking               Sidestepping  GTB 4 laps @ HR GTB 4 laps @ HR GTB   5 laps @ HR BTB 3 laps  @ HR BTB 3 laps  @ HR BTB 3 laps  @ HR BTB 4 laps  @HR   GTB 4 laps @ HR    Step Up & Over           1R 1x10 ea    Step Ups 2R 1x10 ea 2R 1x10 ea 2R   2x10 ea 2R   2x10 ea 2R   2x10 ea 2R   2x10 ea 2R   2x10 ea   2R 1x10 ea    Step Downs 1R 1x10 ea 1R 1x10 1R   2x10 ea 1R   2x10 ea 1R 2x10 ea 1R 2x10 ea 2R 1x10 ea   1R 1x10 ea    Gait with Head Turns - Up/Down & Side/Side x15' x2 laps ea CS/CG x15' x2 laps ea CS/CG x15' x2 laps ea CS/CG x15' x2 laps ea CS/CG np np    Up/down side/side x15' x2 laps ea CS/CG    Hurdles - fwd/lat      Yellow  X3 laps ea Yellow  x3 laps ea       Obstacle Course       NV x2 laps       Modalities                                         "

## 2024-04-19 ENCOUNTER — APPOINTMENT (OUTPATIENT)
Dept: PHYSICAL THERAPY | Facility: REHABILITATION | Age: 54
End: 2024-04-19
Payer: COMMERCIAL

## 2024-04-24 ENCOUNTER — APPOINTMENT (OUTPATIENT)
Dept: PHYSICAL THERAPY | Facility: REHABILITATION | Age: 54
End: 2024-04-24
Payer: COMMERCIAL

## 2024-04-26 ENCOUNTER — APPOINTMENT (OUTPATIENT)
Dept: PHYSICAL THERAPY | Facility: REHABILITATION | Age: 54
End: 2024-04-26
Payer: COMMERCIAL

## 2024-05-15 ENCOUNTER — EVALUATION (OUTPATIENT)
Dept: PHYSICAL THERAPY | Facility: REHABILITATION | Age: 54
End: 2024-05-15
Payer: COMMERCIAL

## 2024-05-15 DIAGNOSIS — M54.12 CERVICAL RADICULOPATHY AT C7: Primary | ICD-10-CM

## 2024-05-15 PROCEDURE — 97110 THERAPEUTIC EXERCISES: CPT | Performed by: PHYSICAL THERAPIST

## 2024-05-15 PROCEDURE — 97163 PT EVAL HIGH COMPLEX 45 MIN: CPT | Performed by: PHYSICAL THERAPIST

## 2024-05-15 NOTE — PROGRESS NOTES
PT Evaluation     Today's date: 5/15/2024  Patient name: Elena Multani  : 1970  MRN: [id number]  Referring provider: Carlos Cho  Dx:   Encounter Diagnosis     ICD-10-CM    1. Cervical radiculopathy at C7  M54.12                      Assessment  Impairments: abnormal coordination, abnormal or restricted ROM, activity intolerance, impaired physical strength, lacks appropriate home exercise program, pain with function, poor posture , poor body mechanics and unable to perform ADL  Symptom irritability: high    Assessment details: Pt is a pleasant 54 y.o. female presenting to outpatient physical therapy with Cervical radiculopathy at C7  (primary encounter diagnosis). Pt presents with cervical and left upper extremity pain, impaired posture, decreased cervical and left shoulder range of motion, decreased deep cervical and left upper extremity strength, impaired ability to perform ADLs, and decreased tolerance to activity.  Pt is a good candidate for outpatient physical therapy and would benefit from skilled physical therapy to address limitations and to achieve goals. Thank you for this referral.   Understanding of Dx/Px/POC: good     Prognosis: good    Goals  Short-Term Goals (4 weeks)   1. Patient will decrease worst rating of pain by 25% to improve quality of life.  2. Patient will increase strength by 1/2 MMT to improve quality of life with improved efficiency of daily activities.  3. Patient will improve ROM by 25% indicating improved mobility of affected area.    Long-Term Goals (8 weeks)   1. Patient will decrease pain by 50% at worst in comparison to IE indicating significant reduction in pain and improved quality of life.  2. Patient will demonstrate strength WFL compared to IE levels indicating ability to independently manage pain symptoms to accomplish daily activities.   3. Patient will be independent with HEP with good form accomplished.      Plan  Patient would benefit from: PT eval and  "skilled PT  Planned modality interventions: cryotherapy and thermotherapy: hydrocollator packs    Planned therapy interventions: IADL retraining, body mechanics training, flexibility, functional ROM exercises, home exercise program, neuromuscular re-education, manual therapy, postural training, strengthening, stretching, therapeutic activities, therapeutic exercise and joint mobilization    Frequency: 2x week  Duration in visits: 20  Duration in weeks: 12  Treatment plan discussed with: patient        Subjective Evaluation    History of Present Illness  Mechanism of injury: Pt is a 54 year-old right handed female presenting to PT with chronic history of neck pain with previous C4-C6 ACDF C4-C6 in 2012 performed by Dr. Conde. Pt reports she had good recovery up until approximately 2 years ago when she had left shoulder sugery - previously had hemiarthroplasty which was converted to complete arthroplasty. Pt feels her shoulder has \"never been the same\" since this surgery and she feels her neck became aggravated because of this. Pt reports she went to Dr. Cho for evaluation, x-rays performed with results as follows:  4/2/24 Cervical X-Ray: Evidence of ACDF in the cervical spine without hardware or complication. No acute fracture or suspicious osseous lesion. 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 Mild alignment abnormalities as noted.     Pt referred to OPPT.    Pain Location: central and left cervical spine with radiation into left upper trapezius region, left upper chest region and into left arm and hand    Pain Type: cervical spine = aching; left upper chest and arm = \"nerve\" shooting    Pain Intensity:  Current: 4  Best: 4  Worst: 10    Pt reports increased pain and/or difficulty with: pushing head back, looking up, side-bending, turning head to either side, bringing arms overhead, lifting, cooking, cleaning. Pt reports sleep disturbance " secondary to pain waking up repeatedly through the night.    Pt reports decreased pain with: nothing.            Recurrent probem    Quality of life: good    Patient Goals  Patient goals for therapy: increased strength, independence with ADLs/IADLs, decreased pain and increased motion      Diagnostic Tests  X-ray: abnormal        Objective     Postural Observations    Additional Postural Observation Details  Moderate forward head, bilateral rounded shoulders, increased thoracic kyphosis.     Neurological Testing     Sensation   Cervical/Thoracic   Left   Diminished: light touch    Right   Intact: light touch    Comments   Left light touch: left upper lateral arm and clavicular region - C5 dermatome.     Reflexes   Left   Biceps (C5/C6): normal (2+)  Triceps (C7): normal (2+)    Right   Biceps (C5/C6): normal (2+)  Triceps (C7): normal (2+)    Active Range of Motion   Cervical/Thoracic Spine       Cervical    Flexion: 20 degrees  with pain  Extension: 22 degrees     with pain  Left lateral flexion: 13 degrees     with pain  Right lateral flexion: 16 degrees     with pain  Left rotation: 39 degrees with pain  Right rotation: 40 degrees    with pain    Joint Play     Hypomobile: T1, T2, T3, T4, T5 and T6     Strength/Myotome Testing     Left Shoulder     Planes of Motion   Flexion: 3+   Abduction: 3+   External rotation at 0°: 3-   Internal rotation at 0°: 3-     Isolated Muscles   Lower trapezius: 3-   Middle trapezius: 3-     Right Shoulder     Planes of Motion   Flexion: 4+   Abduction: 4+   External rotation at 0°: 4+   Internal rotation at 0°: 4+     Isolated Muscles   Lower trapezius: 4-   Middle trapezius: 4-     Left Elbow   Flexion: 4-  Extension: 4-    Right Elbow   Flexion: 5  Extension: 5    Tests   Cervical   Positive neck flexor muscle endurance test.             Precautions:   Patient Active Problem List   Diagnosis    Other abnormal findings on diagnostic imaging of central nervous system    Weakness of  "both lower extremities    Dysphagia    Numbness in both legs    Other spondylosis with radiculopathy, lumbar region    Muscle spasms of both lower extremities    Pernicious anemia    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    Chronic neck pain    Numbness and tingling in left arm    Real time reverse transcriptase PCR positive for COVID-19 virus    Dizziness and giddiness    Persistent postural-perceptual dizziness    Paraparesis of both lower limbs (HCC)    Cervical radiculopathy at C7    CNS demyelination (HCC)    Vestibular dysfunction    Transient vision disturbance of both eyes    Pseudotumor cerebri         Patient's Goals:      Daily Treatment Diary      Assessment                       Eval/Reval  MD                     FOTO         **         **   Manuals                                                     Prescribed POC    Pt Education  MD                      HEP Issued/Updated  MD                      Pulleys w/Cervical MHP                       UBE - Retro  w/Cervical MHP                       H/L Pec Stretch  15\"x3                     H/L Upper Thoracic Extension Stretch  15\"x3                     H/L [place] - Pain-free ROM Only              Cervical Isometrics: Flexion, SB, Rotation  5\"  1x10 ea                     Seated Retractions  5\"  1x10                                                                     Goal Oriented Functional Activity:                                                Modalities    MHP C-Spine w/Pulleys & UBE                                    QR Code:  NV    [employer] HEP:  Access Code: [email or web address]  URL: [email or web address]  Date: 05/15/2024  Prepared by: Kalpana Dobbs    Exercises  - Seated Scapular Retraction  - 1 x daily - 10 reps - 5 hold  - Supine Isometric Neck Sidebend  - 1 x daily - 1 sets - 10 reps - 5 hold  - Supine Isometric Neck Rotation  - 1 x daily - 1 sets - 10 reps - 5 hold  - Supine Isometric Neck Flexion  - 1 x daily - 1 sets - 10 reps - 5 " hold  - [place]  - 1 x daily - 10 reps - 5 hold  - Supine Pectoralis Stretch  - 1 x daily - 3 reps - 15 hold  - Supine Chest Stretch with Elbows Bent  - 1 x daily - 3 reps - 15 hold    Patient Education  - Flat Back Posture  - Heat

## 2024-05-15 NOTE — LETTER
May 22, 2024    Carlos Cho  [address]  [facility] [address]    Patient: Elena Multani   YOB: 1970   Date of Visit: 5/15/2024     Encounter Diagnosis     ICD-10-CM    1. Cervical radiculopathy at C7  M54.12           Dear Dr. Cho:    Thank you for your recent referral of Elena Multani. Please review the attached evaluation summary from Elena's recent visit.     Please verify that you agree with the plan of care by signing the attached order.     If you have any questions or concerns, please do not hesitate to call.     I sincerely appreciate the opportunity to share in the care of one of your patients and hope to have another opportunity to work with you in the near future.       Sincerely,    Kalpana Antonio, PT      Referring Provider:      I certify that I have read the below Plan of Care and certify the need for these services furnished under this plan of treatment while under my care.                    Carlos Cho  [address]  [facility] [address]  Via Fax: [phone]          PT Evaluation     Today's date: 5/15/2024  Patient name: Elena Multani  : 1970  MRN: [id number]  Referring provider: Carlos Cho  Dx:   Encounter Diagnosis     ICD-10-CM    1. Cervical radiculopathy at C7  M54.12                      Assessment  Impairments: abnormal coordination, abnormal or restricted ROM, activity intolerance, impaired physical strength, lacks appropriate home exercise program, pain with function, poor posture , poor body mechanics and unable to perform ADL  Symptom irritability: high    Assessment details: Pt is a pleasant 54 y.o. female presenting to outpatient physical therapy with Cervical radiculopathy at C7  (primary encounter diagnosis). Pt presents with cervical and left upper extremity pain, impaired posture, decreased cervical and left shoulder range of motion, decreased deep cervical and left upper extremity strength,  impaired ability to perform ADLs, and decreased tolerance to activity.  Pt is a good candidate for outpatient physical therapy and would benefit from skilled physical therapy to address limitations and to achieve goals. Thank you for this referral.   Understanding of Dx/Px/POC: good     Prognosis: good    Goals  Short-Term Goals (4 weeks)   1. Patient will decrease worst rating of pain by 25% to improve quality of life.  2. Patient will increase strength by 1/2 MMT to improve quality of life with improved efficiency of daily activities.  3. Patient will improve ROM by 25% indicating improved mobility of affected area.    Long-Term Goals (8 weeks)   1. Patient will decrease pain by 50% at worst in comparison to IE indicating significant reduction in pain and improved quality of life.  2. Patient will demonstrate strength WFL compared to IE levels indicating ability to independently manage pain symptoms to accomplish daily activities.   3. Patient will be independent with HEP with good form accomplished.      Plan  Patient would benefit from: PT eval and skilled PT  Planned modality interventions: cryotherapy and thermotherapy: hydrocollator packs    Planned therapy interventions: IADL retraining, body mechanics training, flexibility, functional ROM exercises, home exercise program, neuromuscular re-education, manual therapy, postural training, strengthening, stretching, therapeutic activities, therapeutic exercise and joint mobilization    Frequency: 2x week  Duration in visits: 20  Duration in weeks: 12  Treatment plan discussed with: patient        Subjective Evaluation    History of Present Illness  Mechanism of injury: Pt is a 54 year-old right handed female presenting to PT with chronic history of neck pain with previous C4-C6 ACDF C4-C6 in 2012 performed by Dr. Conde. Pt reports she had good recovery up until approximately 2 years ago when she had left shoulder sugery - previously had hemiarthroplasty which  "was converted to complete arthroplasty. Pt feels her shoulder has \"never been the same\" since this surgery and she feels her neck became aggravated because of this. Pt reports she went to Dr. Cho for evaluation, x-rays performed with results as follows:  4/2/24 Cervical X-Ray: Evidence of ACDF in the cervical spine without hardware or complication. No acute fracture or suspicious osseous lesion. 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 Mild alignment abnormalities as noted.     Pt referred to OPPT.    Pain Location: central and left cervical spine with radiation into left upper trapezius region, left upper chest region and into left arm and hand    Pain Type: cervical spine = aching; left upper chest and arm = \"nerve\" shooting    Pain Intensity:  Current: 4  Best: 4  Worst: 10    Pt reports increased pain and/or difficulty with: pushing head back, looking up, side-bending, turning head to either side, bringing arms overhead, lifting, cooking, cleaning. Pt reports sleep disturbance secondary to pain waking up repeatedly through the night.    Pt reports decreased pain with: nothing.            Recurrent probem    Quality of life: good    Patient Goals  Patient goals for therapy: increased strength, independence with ADLs/IADLs, decreased pain and increased motion      Diagnostic Tests  X-ray: abnormal        Objective     Postural Observations    Additional Postural Observation Details  Moderate forward head, bilateral rounded shoulders, increased thoracic kyphosis.     Neurological Testing     Sensation   Cervical/Thoracic   Left   Diminished: light touch    Right   Intact: light touch    Comments   Left light touch: left upper lateral arm and clavicular region - C5 dermatome.     Reflexes   Left   Biceps (C5/C6): normal (2+)  Triceps (C7): normal (2+)    Right   Biceps (C5/C6): normal (2+)  Triceps (C7): normal (2+)    Active Range of Motion "   Cervical/Thoracic Spine       Cervical    Flexion: 20 degrees  with pain  Extension: 22 degrees     with pain  Left lateral flexion: 13 degrees     with pain  Right lateral flexion: 16 degrees     with pain  Left rotation: 39 degrees with pain  Right rotation: 40 degrees    with pain    Joint Play     Hypomobile: T1, T2, T3, T4, T5 and T6     Strength/Myotome Testing     Left Shoulder     Planes of Motion   Flexion: 3+   Abduction: 3+   External rotation at 0°: 3-   Internal rotation at 0°: 3-     Isolated Muscles   Lower trapezius: 3-   Middle trapezius: 3-     Right Shoulder     Planes of Motion   Flexion: 4+   Abduction: 4+   External rotation at 0°: 4+   Internal rotation at 0°: 4+     Isolated Muscles   Lower trapezius: 4-   Middle trapezius: 4-     Left Elbow   Flexion: 4-  Extension: 4-    Right Elbow   Flexion: 5  Extension: 5    Tests   Cervical   Positive neck flexor muscle endurance test.             Precautions:   Patient Active Problem List   Diagnosis    Other abnormal findings on diagnostic imaging of central nervous system    Weakness of both lower extremities    Dysphagia    Numbness in both legs    Other spondylosis with radiculopathy, lumbar region    Muscle spasms of both lower extremities    Pernicious anemia    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    Chronic neck pain    Numbness and tingling in left arm    Real time reverse transcriptase PCR positive for COVID-19 virus    Dizziness and giddiness    Persistent postural-perceptual dizziness    Paraparesis of both lower limbs (HCC)    Cervical radiculopathy at C7    CNS demyelination (HCC)    Vestibular dysfunction    Transient vision disturbance of both eyes    Pseudotumor cerebri         Patient's Goals:      Daily Treatment Diary      Assessment                       Eval/Reval  MD                     FOTO         **         **   Manuals                                                     Prescribed POC    Pt Education  MD                    "   HEP Issued/Updated  MD                      Pulleys w/Cervical MHP                       UBE - Retro  w/Cervical MHP                       H/L Pec Stretch  15\"x3                     H/L Upper Thoracic Extension Stretch  15\"x3                     H/L [place] - Pain-free ROM Only              Cervical Isometrics: Flexion, SB, Rotation  5\"  1x10 ea                     Seated Retractions  5\"  1x10                                                                     Goal Oriented Functional Activity:                                                Modalities    MHP C-Spine w/Pulleys & UBE                                    QR Code:  NV    [employer] HEP:  Access Code: [email or web address]  URL: [email or web address]  Date: 05/15/2024  Prepared by: Kalpana Dobbs    Exercises  - Seated Scapular Retraction  - 1 x daily - 10 reps - 5 hold  - Supine Isometric Neck Sidebend  - 1 x daily - 1 sets - 10 reps - 5 hold  - Supine Isometric Neck Rotation  - 1 x daily - 1 sets - 10 reps - 5 hold  - Supine Isometric Neck Flexion  - 1 x daily - 1 sets - 10 reps - 5 hold  - Snow [place]  - 1 x daily - 10 reps - 5 hold  - Supine Pectoralis Stretch  - 1 x daily - 3 reps - 15 hold  - Supine Chest Stretch with Elbows Bent  - 1 x daily - 3 reps - 15 hold    Patient Education  - Flat Back Posture  - Heat                       "

## 2024-05-22 ENCOUNTER — OFFICE VISIT (OUTPATIENT)
Dept: PHYSICAL THERAPY | Facility: REHABILITATION | Age: 54
End: 2024-05-22
Payer: COMMERCIAL

## 2024-05-22 DIAGNOSIS — M54.12 CERVICAL RADICULOPATHY AT C7: Primary | ICD-10-CM

## 2024-05-22 PROCEDURE — 97112 NEUROMUSCULAR REEDUCATION: CPT | Performed by: PHYSICAL THERAPIST

## 2024-05-22 PROCEDURE — 97110 THERAPEUTIC EXERCISES: CPT | Performed by: PHYSICAL THERAPIST

## 2024-05-22 NOTE — PROGRESS NOTES
Daily Note     Today's date: 2024  Patient name: Elena Multani  : 1970  MRN: [id number]  Referring provider: Carlos Cho  Dx:   Encounter Diagnosis     ICD-10-CM    1. Cervical radiculopathy at C7  M54.12                      Subjective: Pt reports she didn't do all of her exercises since her eval, will work on them tomorrow.    Objective: See treatment diary below. Reviewed HEP to which she demonstrated and verbalized understanding.    Assessment: Pt with good tolerance to progression of program with addition of pulleys and UBE. Pt required moderate verbal and manual cues for correct form and performance with all exercises today.  Will continue to progress program as able. Pt will benefit from continued skilled PT intervention in order to address remaining limitations and to restore maximal function.     Plan: Continue per plan of care.  Progress treatment as tolerated.       Precautions:   Patient Active Problem List   Diagnosis    Other abnormal findings on diagnostic imaging of central nervous system    Weakness of both lower extremities    Dysphagia    Numbness in both legs    Other spondylosis with radiculopathy, lumbar region    Muscle spasms of both lower extremities    Pernicious anemia    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    Chronic neck pain    Numbness and tingling in left arm    Real time reverse transcriptase PCR positive for COVID-19 virus    Dizziness and giddiness    Persistent postural-perceptual dizziness    Paraparesis of both lower limbs (HCC)    Cervical radiculopathy at C7    CNS demyelination (HCC)    Vestibular dysfunction    Transient vision disturbance of both eyes    Pseudotumor cerebri         Daily Treatment Diary      Assessment  5/15  5/22                   Eval/Reval  MD                     FOTO         **         **   Manuals                                                     Prescribed POC    Pt Education  MD                      HEP Issued/Updated  MD       "                Pulleys w/Cervical MHP   5\" x 3 min                    UBE - Retro  w/Cervical MHP   L1x3'                    H/L Pec Stretch  15\"x3 15\"x3                    H/L Upper Thoracic Extension Stretch  15\"x3 15\"x3                    H/L [place] - Pain-free ROM Only 5\"x10 NV            Cervical Isometrics: Flexion, SB, Rotation  5\"  1x10 ea 5\"  1x10 ea                    Seated Retractions  5\"  1x10 5\"  1x10                                                                     Goal Oriented Functional Activity:                                                Modalities    MHP C-Spine w/Pulleys & UBE    + w/TE  15'                                QR Code:  NV    [employer] HEP:  Access Code: [email or web address]  URL: [email or web address]  Date: 05/15/2024  Prepared by: Kalpana Dobbs    Exercises  - Seated Scapular Retraction  - 1 x daily - 10 reps - 5 hold  - Supine Isometric Neck Sidebend  - 1 x daily - 1 sets - 10 reps - 5 hold  - Supine Isometric Neck Rotation  - 1 x daily - 1 sets - 10 reps - 5 hold  - Supine Isometric Neck Flexion  - 1 x daily - 1 sets - 10 reps - 5 hold  - [place]  - 1 x daily - 10 reps - 5 hold  - Supine Pectoralis Stretch  - 1 x daily - 3 reps - 15 hold  - Supine Chest Stretch with Elbows Bent  - 1 x daily - 3 reps - 15 hold    Patient Education  - Flat Back Posture  - Heat       "

## 2024-05-24 ENCOUNTER — OFFICE VISIT (OUTPATIENT)
Dept: PHYSICAL THERAPY | Facility: REHABILITATION | Age: 54
End: 2024-05-24
Payer: COMMERCIAL

## 2024-05-24 DIAGNOSIS — M54.12 CERVICAL RADICULOPATHY AT C7: Primary | ICD-10-CM

## 2024-05-24 PROCEDURE — 97530 THERAPEUTIC ACTIVITIES: CPT

## 2024-05-24 PROCEDURE — 97112 NEUROMUSCULAR REEDUCATION: CPT

## 2024-05-24 PROCEDURE — 97110 THERAPEUTIC EXERCISES: CPT

## 2024-05-24 NOTE — PROGRESS NOTES
"Daily Note     Today's date: 2024  Patient name: Elena Multani  : 1970  MRN: [id number]  Referring provider: Carlos Cho  Dx:   Encounter Diagnosis     ICD-10-CM    1. Cervical radiculopathy at C7  M54.12                      Subjective: pt reports no adverse reactions following last visit. She noted she has been doing her exercises at home.      Objective: See treatment diary below      Assessment: Pt tolerated treatment well and without complaints. Increased dosage with exercises with good response. Patient demonstrated fatigue post treatment, exhibited good technique with therapeutic exercises, and would benefit from continued PT      Plan: Continue per plan of care.  Progress treatment as tolerated.       Precautions:   Patient Active Problem List   Diagnosis    Other abnormal findings on diagnostic imaging of central nervous system    Weakness of both lower extremities    Dysphagia    Numbness in both legs    Other spondylosis with radiculopathy, lumbar region    Muscle spasms of both lower extremities    Pernicious anemia    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    Chronic neck pain    Numbness and tingling in left arm    Real time reverse transcriptase PCR positive for COVID-19 virus    Dizziness and giddiness    Persistent postural-perceptual dizziness    Paraparesis of both lower limbs (HCC)    Cervical radiculopathy at C7    CNS demyelination (HCC)    Vestibular dysfunction    Transient vision disturbance of both eyes    Pseudotumor cerebri         Daily Treatment Diary      Assessment  5/15  5/22  5/24                 Eval/Reval  MD                     FOTO         **         **   Manuals                                                     Prescribed POC    Pt Education  MD                      HEP Issued/Updated  MD [place] w/Cervical MHP   5\" x 3 min  5\"x 5 min                  UBE - Retro  w/Cervical MHP   L1x3'  L1x5'                  H/L Pec Stretch  15\"x3 " "15\"x3  15\"x3                  H/L Upper Thoracic Extension Stretch  15\"x3 15\"x3  15\"x3                  H/L Snow [place] - Pain-free ROM Only 5\"x10 NV 5\"x5           Cervical Isometrics: Flexion, SB, Rotation  5\"  1x10 ea 5\"  1x10 ea  5\"1x10 ea                  Seated Retractions  5\"  1x10 5\"  1x10   5\"  2x10                                                                  Goal Oriented Functional Activity:                                                Modalities    MHP C-Spine w/Pulleys & UBE    + w/TE  15'  + w/TE  15'                              QR Code:  NV    [employer] HEP:  Access Code: [email or web address]  URL: [email or web address]  Date: 05/15/2024  Prepared by: Kalpana Dobbs    Exercises  - Seated Scapular Retraction  - 1 x daily - 10 reps - 5 hold  - Supine Isometric Neck Sidebend  - 1 x daily - 1 sets - 10 reps - 5 hold  - Supine Isometric Neck Rotation  - 1 x daily - 1 sets - 10 reps - 5 hold  - Supine Isometric Neck Flexion  - 1 x daily - 1 sets - 10 reps - 5 hold  - Snow [place]  - 1 x daily - 10 reps - 5 hold  - Supine Pectoralis Stretch  - 1 x daily - 3 reps - 15 hold  - Supine Chest Stretch with Elbows Bent  - 1 x daily - 3 reps - 15 hold    Patient Education  - Flat Back Posture  - Heat         "

## 2024-05-28 ENCOUNTER — OFFICE VISIT (OUTPATIENT)
Dept: PHYSICAL THERAPY | Facility: REHABILITATION | Age: 54
End: 2024-05-28
Payer: COMMERCIAL

## 2024-05-28 DIAGNOSIS — M54.12 CERVICAL RADICULOPATHY AT C7: Primary | ICD-10-CM

## 2024-05-28 PROCEDURE — 97110 THERAPEUTIC EXERCISES: CPT | Performed by: PHYSICAL THERAPIST

## 2024-05-28 PROCEDURE — 97112 NEUROMUSCULAR REEDUCATION: CPT | Performed by: PHYSICAL THERAPIST

## 2024-05-28 NOTE — PROGRESS NOTES
Daily Note     Today's date: 2024  Patient name: Elena Multani  : 1970  MRN: [id number]  Referring provider: Carlos Cho  Dx:   Encounter Diagnosis     ICD-10-CM    1. Cervical radiculopathy at C7  M54.12                      Subjective: Pt with no new complaints since last session.    Objective: See treatment diary below.    Assessment: Pt with good tolerance to progression of program with resumption of [employer] balance training and bike for endurance. She required moderate verbal and manual cues for correct form and performance with all exercises today.  She noted appropriate challenge and fatigue with all exercised today without adverse response. Will continue to progress program as able. Pt will benefit from continued skilled PT intervention in order to address remaining limitations and to restore maximal function.     Plan: Continue per plan of care.  Progress treatment as tolerated.       Precautions:   Patient Active Problem List   Diagnosis    Other abnormal findings on diagnostic imaging of central nervous system    Weakness of both lower extremities    Dysphagia    Numbness in both legs    Other spondylosis with radiculopathy, lumbar region    Muscle spasms of both lower extremities    Pernicious anemia    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    Chronic neck pain    Numbness and tingling in left arm    Real time reverse transcriptase PCR positive for COVID-19 virus    Dizziness and giddiness    Persistent postural-perceptual dizziness    Paraparesis of both lower limbs (HCC)    Cervical radiculopathy at C7    CNS demyelination (HCC)    Vestibular dysfunction    Transient vision disturbance of both eyes    Pseudotumor cerebri         Daily Treatment Diary      Assessment  5/15  5/22  5/24  5/28               Alcon/Reval  MD                     FOTO         **         **   Manuals                                                     Prescribed POC    Pt Education  MD                      HEP  "Issued/Updated  MD                      Pulleys w/Cervical MHP   5\" x 3 min  5\"x 5 min  5\"x 5 min                UBE - Retro  w/Cervical MHP   L1x3'  L1x5'  L3x5'                H/L Pec Stretch  15\"x3 15\"x3  15\"x3  20\"x3                H/L Upper Thoracic Extension Stretch  15\"x3 15\"x3  15\"x3  20\"x3                H/L [place] - Pain-free ROM Only 5\"x10 NV 5\"x5 5\"x10          Cervical Isometrics: Flexion, SB, Rotation  5\"  1x10 ea 5\"  1x10 ea  5\"  1x10 ea  5\"  1x10 ea                Seated Retractions  5\"  1x10 5\"  1x10   5\"  2x10  5\"  2x10                                       Balance/LE Strength & Endurance:             Bike    L1x5'         Biodex LOS    Level 2  Trial 1:  53\", 75%    Trial 2:  52\", 80%    Trial 3:  45\", 84%         [employer] - Maze    Level 2  Trial 1:   55\", 92%    Trial 2:  52\", 94%    Trial 3:  58\", 92%         FTEO - [employer]             FAEC - Floor             FTEO -   [employer] + Head Turns L/R             FTEO -   [employer] +  Head Nods Up/Down             Tandem Balance             Tandem Walking             Sidestepping             Step Up & Over             Step Ups             Step Downs             Gait with Head Turns - Up/Down & Side/Side             Hurdles - fwd/lat             Obstacle Course                                       Modalities    MHP C-Spine w/Pulleys & UBE    + w/TE  15'  + w/TE  15'  + w/TE  15'                            QR Code:  NV    [email or web address] HEP:  Access Code: [email or web address]  URL: [email or web address]  Date: 05/15/2024  Prepared by: Kalpana Dobbs    Exercises  - Seated Scapular Retraction  - 1 x daily - 10 reps - 5 hold  - Supine Isometric Neck Sidebend  - 1 x daily - 1 sets - 10 reps - 5 hold  - Supine Isometric Neck Rotation  - 1 x daily - 1 sets - 10 reps - 5 hold  - Supine Isometric Neck Flexion  - 1 x daily - 1 sets - 10 reps - 5 hold  - [place]  - 1 x daily - 10 reps - 5 hold  - Supine Pectoralis Stretch  - 1 x daily - 3 reps - 15 hold  - Supine " Chest Stretch with Elbows Bent  - 1 x daily - 3 reps - 15 hold    Patient Education  - Flat Back Posture  - Heat

## 2024-05-29 ENCOUNTER — TELEPHONE (OUTPATIENT)
Dept: NEUROLOGY | Facility: CLINIC | Age: 54
End: 2024-05-29

## 2024-05-30 ENCOUNTER — OFFICE VISIT (OUTPATIENT)
Dept: PHYSICAL THERAPY | Facility: REHABILITATION | Age: 54
End: 2024-05-30
Payer: COMMERCIAL

## 2024-05-30 DIAGNOSIS — G82.20 PARAPARESIS OF BOTH LOWER LIMBS (HCC): ICD-10-CM

## 2024-05-30 DIAGNOSIS — M54.12 CERVICAL RADICULOPATHY AT C7: Primary | ICD-10-CM

## 2024-05-30 DIAGNOSIS — R29.6 FREQUENT FALLS: ICD-10-CM

## 2024-05-30 DIAGNOSIS — M62.838 MUSCLE SPASMS OF BOTH LOWER EXTREMITIES: ICD-10-CM

## 2024-05-30 PROCEDURE — 97112 NEUROMUSCULAR REEDUCATION: CPT | Performed by: PHYSICAL THERAPIST

## 2024-05-30 PROCEDURE — 97110 THERAPEUTIC EXERCISES: CPT | Performed by: PHYSICAL THERAPIST

## 2024-05-30 NOTE — PROGRESS NOTES
"Daily Note     Today's date: 2024  Patient name: Elena Multani  : 1970  MRN: [id number]  Referring provider: Carlos Cho  Dx:   Encounter Diagnosis     ICD-10-CM    1. Cervical radiculopathy at C7  M54.12       2. Muscle spasms of both lower extremities  M62.838       3. Paraparesis of both lower limbs (HCC)  G82.20       4. Frequent falls  R29.6                      Subjective: Pt reports her balance feels \"off\" today. She reports slight improvement in cervical discomfort, however, notes continued left UE numbness/tingling.     Objective: See treatment diary below.    Assessment: Pt with good tolerance to progression of previous balance training. She required moderate verbal and manual cues for correct form and performance with all exercises today.  She noted appropriate challenge and fatigue with all exercised today without adverse response. Will continue to progress program as able. Pt will benefit from continued skilled PT intervention in order to address remaining limitations and to restore maximal function.     Plan: Continue per plan of care.  Progress treatment as tolerated.       Precautions:   Patient Active Problem List   Diagnosis    Other abnormal findings on diagnostic imaging of central nervous system    Weakness of both lower extremities    Dysphagia    Numbness in both legs    Other spondylosis with radiculopathy, lumbar region    Muscle spasms of both lower extremities    Pernicious anemia    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    Chronic neck pain    Numbness and tingling in left arm    Real time reverse transcriptase PCR positive for COVID-19 virus    Dizziness and giddiness    Persistent postural-perceptual dizziness    Paraparesis of both lower limbs (HCC)    Cervical radiculopathy at C7    CNS demyelination (HCC)    Vestibular dysfunction    Transient vision disturbance of both eyes    Pseudotumor cerebri         Daily Treatment Diary      Assessment  5/15  5/22  5/24  " "5/28 5/30             Eval/Reval  MD                     FOTO         **         **   Manuals                                                     Prescribed POC    Pt Education  MD                      HEP Issued/Updated  MD                      Pulleys w/Cervical MHP   5\" x 3 min  5\"x 5 min  5\"x 5 min  5\"x 5 min              UBE - Retro  w/Cervical MHP   L1x3'  L1x5'  L3x5'  L3x5'              H/L Pec Stretch  15\"x3 15\"x3  15\"x3  20\"x3                H/L Upper Thoracic Extension Stretch  15\"x3 15\"x3  15\"x3  20\"x3                H/L [place] - Pain-free ROM Only 5\"x10 NV 5\"x5 5\"x10          Cervical Isometrics: Flexion, SB, Rotation  5\"  1x10 ea 5\"  1x10 ea  5\"  1x10 ea  5\"  1x10 ea                Seated Retractions  5\"  1x10 5\"  1x10   5\"  2x10  5\"  2x10                                       Balance/LE Strength & Endurance:             Bike    L1x5' L1x5'        Biodex LOS    Level 2  Trial 1:  53\", 75%    Trial 2:  52\", 80%    Trial 3:  45\", 84% Level 2  Trial 1:  54\", 72%    Trial 2:  52\", 76%    Trial 3:  49\", 80%        Biodex - Maze    Level 2  Trial 1:   55\", 92%    Trial 2:  52\", 94%    Trial 3:  58\", 92% Level 2  Trial 1:   1'1\"\", 92%    Trial 2:  1'2\"\", 98%    Trial 3:  1'4\", 94%        FTEO - Airex     30\"x3  CS        FAEC - Airex     30\"x3  CS        Tandem Balance     30\"x2 ea  CS        Tandem Walking             Sidestepping + TB     Green   4 laps        Step Up & Over             Step Ups             Step Downs             Gait with Head Turns - Up/Down & Side/Side             Hurdles - fwd/lat             Obstacle Course                                       Modalities    MHP C-Spine w/Pulleys & UBE    + w/TE  15'  + w/TE  15'  + w/TE  15'  10'                          QR Code:  NV    [employer] HEP:  Access Code: [email or web address]  URL: [email or web address]  Date: 05/15/2024  Prepared by: Kalpana Dobbs    Exercises  - Seated Scapular Retraction  - 1 x daily - 10 reps - 5 hold  - Supine " Isometric Neck Sidebend  - 1 x daily - 1 sets - 10 reps - 5 hold  - Supine Isometric Neck Rotation  - 1 x daily - 1 sets - 10 reps - 5 hold  - Supine Isometric Neck Flexion  - 1 x daily - 1 sets - 10 reps - 5 hold  - Snow [place]  - 1 x daily - 10 reps - 5 hold  - Supine Pectoralis Stretch  - 1 x daily - 3 reps - 15 hold  - Supine Chest Stretch with Elbows Bent  - 1 x daily - 3 reps - 15 hold    Patient Education  - Flat Back Posture  - Heat

## 2024-06-04 ENCOUNTER — APPOINTMENT (OUTPATIENT)
Dept: PHYSICAL THERAPY | Facility: REHABILITATION | Age: 54
End: 2024-06-04
Payer: COMMERCIAL

## 2024-06-07 ENCOUNTER — OFFICE VISIT (OUTPATIENT)
Dept: PHYSICAL THERAPY | Facility: REHABILITATION | Age: 54
End: 2024-06-07
Payer: COMMERCIAL

## 2024-06-07 ENCOUNTER — APPOINTMENT (OUTPATIENT)
Dept: PHYSICAL THERAPY | Facility: REHABILITATION | Age: 54
End: 2024-06-07
Payer: COMMERCIAL

## 2024-06-07 DIAGNOSIS — R29.6 FREQUENT FALLS: ICD-10-CM

## 2024-06-07 DIAGNOSIS — G82.20 PARAPARESIS OF BOTH LOWER LIMBS (HCC): ICD-10-CM

## 2024-06-07 DIAGNOSIS — M62.838 MUSCLE SPASMS OF BOTH LOWER EXTREMITIES: ICD-10-CM

## 2024-06-07 DIAGNOSIS — M54.12 CERVICAL RADICULOPATHY AT C7: Primary | ICD-10-CM

## 2024-06-07 PROCEDURE — 97110 THERAPEUTIC EXERCISES: CPT

## 2024-06-07 PROCEDURE — 97112 NEUROMUSCULAR REEDUCATION: CPT

## 2024-06-07 NOTE — PROGRESS NOTES
"Daily Note     Today's date: 2024  Patient name: Elena Multani  : 1970  MRN: [id number]  Referring provider: Carlos Cho  Dx:   Encounter Diagnosis     ICD-10-CM    1. Cervical radiculopathy at C7  M54.12       2. Muscle spasms of both lower extremities  M62.838       3. Paraparesis of both lower limbs (HCC)  G82.20       4. Frequent falls  R29.6                      Subjective: Pt reports her balance feels \"off\" today. She reports slight improvement in cervical discomfort, however, notes continued left UE numbness/tingling.     Objective: See treatment diary below.    Assessment: Pt with good tolerance to progression of previous balance training. She required moderate verbal and manual cues for correct form and performance with all exercises today.  She noted appropriate challenge and fatigue with all exercised today without adverse response. Will continue to progress program as able. Pt will benefit from continued skilled PT intervention in order to address remaining limitations and to restore maximal function.     Plan: Continue per plan of care.  Progress treatment as tolerated.       Precautions:   Patient Active Problem List   Diagnosis    Other abnormal findings on diagnostic imaging of central nervous system    Weakness of both lower extremities    Dysphagia    Numbness in both legs    Other spondylosis with radiculopathy, lumbar region    Muscle spasms of both lower extremities    Pernicious anemia    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    Chronic neck pain    Numbness and tingling in left arm    Real time reverse transcriptase PCR positive for COVID-19 virus    Dizziness and giddiness    Persistent postural-perceptual dizziness    Paraparesis of both lower limbs (HCC)    Cervical radiculopathy at C7    CNS demyelination (HCC)    Vestibular dysfunction    Transient vision disturbance of both eyes    Pseudotumor cerebri         Daily Treatment Diary      Assessment  5/15  5/22  5/24  " "5/28 5/30 6/7           Eval/Reval  MD                     FOTO         **         **   Manuals                                                     Prescribed POC    Pt Education  MD                      HEP Issued/Updated  MD                      Pulleys w/Cervical MHP   5\" x 3 min  5\"x 5 min  5\"x 5 min  5\"x 5 min  5\"x 5 min            UBE - Retro  w/Cervical MHP   L1x3'  L1x5'  L3x5'  L3x5'  L3x5'            H/L Pec Stretch  15\"x3 15\"x3  15\"x3  20\"x3                H/L Upper Thoracic Extension Stretch  15\"x3 15\"x3  15\"x3  20\"x3                H/L [place] - Pain-free ROM Only 5\"x10 NV 5\"x5 5\"x10          Cervical Isometrics: Flexion, SB, Rotation  5\"  1x10 ea 5\"  1x10 ea  5\"  1x10 ea  5\"  1x10 ea                Seated Retractions  5\"  1x10 5\"  1x10   5\"  2x10  5\"  2x10    GTB  5\"1x10            TB shoulder ext           OTB  5\"1x10             Balance/LE Strength & Endurance:             Bike    L1x5' L1x5' L1x5'       Biodex LOS    Level 2  Trial 1:  53\", 75%    Trial 2:  52\", 80%    Trial 3:  45\", 84% Level 2  Trial 1:  54\", 72%    Trial 2:  52\", 76%    Trial 3:  49\", 80% Level 2  Trial 1:  42\", 86%    Trial 2:  47\", 70%    Trial 3:  57\", 69%       Biodex - Maze    Level 2  Trial 1:   55\", 92%    Trial 2:  52\", 94%    Trial 3:  58\", 92% Level 2  Trial 1:   1'1\"\", 92%    Trial 2:  1'2\"\", 98%    Trial 3:  1'4\", 94% Level 2  Trial 1:  59\", 94%    Trial 2:  1'02\", 96%    Trial 3:  1'03\", 90%       FTEO - Airex     30\"x3  CS 30\"x3  CS       FAEC - Airex     30\"x3  CS 30\"x3  CS       Tandem Balance     30\"x2 ea  CS 30\"x2 ea  CS       Tandem Walking             Sidestepping + TB     Green   4 laps Green   4 laps       Step Up & Over             Step Ups             Step Downs             Gait with Head Turns - Up/Down & Side/Side             Hurdles - fwd/lat             Obstacle Course                                       Modalities    MHP C-Spine w/Pulleys & UBE    + w/TE  15'  + w/TE  15'  + w/TE  15'  10'  " 10'                        QR Code:  NV    [employer] HEP:  Access Code: [email or web address]  URL: [email or web address]  Date: 05/15/2024  Prepared by: Kalpana Dobbs    Exercises  - Seated Scapular Retraction  - 1 x daily - 10 reps - 5 hold  - Supine Isometric Neck Sidebend  - 1 x daily - 1 sets - 10 reps - 5 hold  - Supine Isometric Neck Rotation  - 1 x daily - 1 sets - 10 reps - 5 hold  - Supine Isometric Neck Flexion  - 1 x daily - 1 sets - 10 reps - 5 hold  - Snow [place]  - 1 x daily - 10 reps - 5 hold  - Supine Pectoralis Stretch  - 1 x daily - 3 reps - 15 hold  - Supine Chest Stretch with Elbows Bent  - 1 x daily - 3 reps - 15 hold    Patient Education  - Flat Back Posture  - Heat

## 2024-06-11 ENCOUNTER — OFFICE VISIT (OUTPATIENT)
Dept: PHYSICAL THERAPY | Facility: REHABILITATION | Age: 54
End: 2024-06-11
Payer: COMMERCIAL

## 2024-06-11 DIAGNOSIS — G82.20 PARAPARESIS OF BOTH LOWER LIMBS (HCC): ICD-10-CM

## 2024-06-11 DIAGNOSIS — M54.12 CERVICAL RADICULOPATHY AT C7: Primary | ICD-10-CM

## 2024-06-11 DIAGNOSIS — R29.6 FREQUENT FALLS: ICD-10-CM

## 2024-06-11 DIAGNOSIS — M62.838 MUSCLE SPASMS OF BOTH LOWER EXTREMITIES: ICD-10-CM

## 2024-06-11 PROCEDURE — 97110 THERAPEUTIC EXERCISES: CPT

## 2024-06-11 PROCEDURE — 97112 NEUROMUSCULAR REEDUCATION: CPT

## 2024-06-11 NOTE — PROGRESS NOTES
Daily Note     Today's date: 2024  Patient name: Elena Multani  : 1970  MRN: [id number]  Referring provider: Carlos Cho  Dx:   Encounter Diagnosis     ICD-10-CM    1. Cervical radiculopathy at C7  M54.12       2. Muscle spasms of both lower extremities  M62.838       3. Paraparesis of both lower limbs (HCC)  G82.20       4. Frequent falls  R29.6           Start Time: 0900  Stop Time: 0950  Total time in clinic (min): 50 minutes    Subjective: Pt offers no new complaints since last session. Overall feels like therapy is helping with her symptoms.          Objective: See treatment diary below.      Assessment: Pt tolerated session well with continued focus on balance training, scapular strengthening and functional activities to improve mobility. Patient able to progress in program - able to perform Biodex levels all under a minute, increased resistance and demonstrated good overall balance during activities today. Pt demonstrated adequate technique and will benefit from continued skilled PT intervention in order to address remaining limitations and to restore maximal function.         Plan: Continue per plan of care.  Progress treatment as tolerated.       Precautions:   Patient Active Problem List   Diagnosis    Other abnormal findings on diagnostic imaging of central nervous system    Weakness of both lower extremities    Dysphagia    Numbness in both legs    Other spondylosis with radiculopathy, lumbar region    Muscle spasms of both lower extremities    Pernicious anemia    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    Chronic neck pain    Numbness and tingling in left arm    Real time reverse transcriptase PCR positive for COVID-19 virus    Dizziness and giddiness    Persistent postural-perceptual dizziness    Paraparesis of both lower limbs (HCC)    Cervical radiculopathy at C7    CNS demyelination (HCC)    Vestibular dysfunction    Transient vision disturbance of both eyes    Pseudotumor cerebri  "        Daily Treatment Diary      Assessment  5/15  5/22  5/24  5/28  5/30  6/7  6/11         Eval/Reval  MD                     FOTO         **         **   Manuals                                                     Prescribed POC    Pt Education  MD                      HEP Issued/Updated  MD                      Pulleys w/Cervical MHP   5\" x 3 min  5\"x 5 min  5\"x 5 min  5\"x 5 min  5\"x 5 min  5\"x 5 min          UBE - Retro  w/Cervical MHP   L1x3'  L1x5'  L3x5'  L3x5'  L3x5'  L3x5'          H/L Pec Stretch  15\"x3 15\"x3  15\"x3  20\"x3                H/L Upper Thoracic Extension Stretch  15\"x3 15\"x3  15\"x3  20\"x3                H/L [place] - Pain-free ROM Only 5\"x10 NV 5\"x5 5\"x10          Cervical Isometrics: Flexion, SB, Rotation  5\"  1x10 ea 5\"  1x10 ea  5\"  1x10 ea  5\"  1x10 ea                Seated Retractions  5\"  1x10 5\"  1x10   5\"  2x10  5\"  2x10    GTB  5\"1x10  GTB  5\"1x10          TB shoulder ext           OTB  5\"1x10    GTB  5\"1x10         Balance/LE Strength & Endurance:             Bike    L1x5' L1x5' L1x5' L1 5'      Biodex LOS    Level 2  Trial 1:  53\", 75%    Trial 2:  52\", 80%    Trial 3:  45\", 84% Level 2  Trial 1:  54\", 72%    Trial 2:  52\", 76%    Trial 3:  49\", 80% Level 2  Trial 1:  42\", 86%    Trial 2:  47\", 70%    Trial 3:  57\", 69% Level 2  Trial 1:  54\", 78%    Trial 2:  48\", 75%    Trial 3:  41\", 84%      Biodex - Maze    Level 2  Trial 1:   55\", 92%    Trial 2:  52\", 94%    Trial 3:  58\", 92% Level 2  Trial 1:   1'1\"\", 92%    Trial 2:  1'2\"\", 98%    Trial 3:  1'4\", 94% Level 2  Trial 1:  59\", 94%    Trial 2:  1'02\", 96%    Trial 3:  1'03\", 90% Level 2  Trial 1:  49\", 96%    Trial 2:  49\", 96%    Trial 3:  50\", 94%      FTEO - Airex     30\"x3  CS 30\"x3  CS 30\"x3  CS      FAEC - Airex     30\"x3  CS 30\"x3  CS 30\"x3  CS      Tandem Balance     30\"x2 ea  CS 30\"x2 ea  CS 30\"x2 ea  CS      Tandem Walking             Sidestepping + TB     Green   4 laps Green   4 laps Green   4 laps      Step " Up & Over             Step Ups             Step Downs             Gait with Head Turns - Up/Down & Side/Side             Hurdles - fwd/lat             Obstacle Course                                       Modalities    MHP C-Spine w/Pulleys & UBE    + w/TE  15'  + w/TE  15'  + w/TE  15'  10'  10'  10'                      QR Code:  NV    [employer] HEP:  Access Code: [email or web address]  URL: [email or web address]  Date: 05/15/2024  Prepared by: Kalpana Dobbs    Exercises  - Seated Scapular Retraction  - 1 x daily - 10 reps - 5 hold  - Supine Isometric Neck Sidebend  - 1 x daily - 1 sets - 10 reps - 5 hold  - Supine Isometric Neck Rotation  - 1 x daily - 1 sets - 10 reps - 5 hold  - Supine Isometric Neck Flexion  - 1 x daily - 1 sets - 10 reps - 5 hold  - Snow [place]  - 1 x daily - 10 reps - 5 hold  - Supine Pectoralis Stretch  - 1 x daily - 3 reps - 15 hold  - Supine Chest Stretch with Elbows Bent  - 1 x daily - 3 reps - 15 hold    Patient Education  - Flat Back Posture  - Heat

## 2024-06-13 ENCOUNTER — HOSPITAL ENCOUNTER (OUTPATIENT)
Dept: MRI IMAGING | Facility: HOSPITAL | Age: 54
Discharge: HOME | End: 2024-06-13
Payer: COMMERCIAL

## 2024-06-13 ENCOUNTER — APPOINTMENT (OUTPATIENT)
Dept: PHYSICAL THERAPY | Facility: REHABILITATION | Age: 54
End: 2024-06-13
Payer: COMMERCIAL

## 2024-06-13 ENCOUNTER — OFFICE VISIT (OUTPATIENT)
Dept: NEUROLOGY | Facility: CLINIC | Age: 54
End: 2024-06-13
Payer: COMMERCIAL

## 2024-06-13 VITALS
SYSTOLIC BLOOD PRESSURE: 120 MMHG | HEART RATE: 81 BPM | TEMPERATURE: 97.1 F | WEIGHT: 203.9 LBS | BODY MASS INDEX: 32.77 KG/M2 | HEIGHT: 66 IN | DIASTOLIC BLOOD PRESSURE: 56 MMHG

## 2024-06-13 DIAGNOSIS — H46.9 OPTIC NEURITIS: ICD-10-CM

## 2024-06-13 DIAGNOSIS — G93.2 PSEUDOTUMOR CEREBRI: ICD-10-CM

## 2024-06-13 DIAGNOSIS — G37.9 CNS DEMYELINATION (HCC): Primary | ICD-10-CM

## 2024-06-13 DIAGNOSIS — G37.9 CNS DEMYELINATION (HCC): ICD-10-CM

## 2024-06-13 PROCEDURE — 99215 OFFICE O/P EST HI 40 MIN: CPT

## 2024-06-13 PROCEDURE — 70543 MRI ORBT/FAC/NCK W/O &W/DYE: CPT

## 2024-06-13 PROCEDURE — 70553 MRI BRAIN STEM W/O & W/DYE: CPT

## 2024-06-13 PROCEDURE — A9585 GADOBUTROL INJECTION: HCPCS | Performed by: RADIOLOGY

## 2024-06-13 RX ORDER — FOLIC ACID 1 MG/1
1 TABLET ORAL DAILY
COMMUNITY

## 2024-06-13 RX ORDER — GADOBUTROL 604.72 MG/ML
9 INJECTION INTRAVENOUS
Status: COMPLETED | OUTPATIENT
Start: 2024-06-13 | End: 2024-06-13

## 2024-06-13 RX ORDER — METOPROLOL SUCCINATE 25 MG/1
25 TABLET, EXTENDED RELEASE ORAL DAILY
COMMUNITY

## 2024-06-13 RX ORDER — METHOTREXATE 2.5 MG/1
2.5 TABLET ORAL WEEKLY
COMMUNITY

## 2024-06-13 RX ADMIN — GADOBUTROL 9 ML: 604.72 INJECTION INTRAVENOUS at 17:37

## 2024-06-13 NOTE — PROGRESS NOTES
Patient ID: Elena Multani is a 54 y.o. female.    Assessment/Plan:    Optic neuritis  Patient reports recently having an Ophthalmologist exam concerning for left optic nerve inflammation. We discussed the presence of optic neuritis in the setting of multiple white matter lesions, is more concerning for MS. It is unclear why she was not sent to the ER immediately for suspected optic neuritis as this could result in permanent vision loss. Today she continues to describe visual disturbances and eye pain with extraocular eye motions. To assess further I have asked her to complete a stat MRI Brain and Orbit. Pending results will treat with IV Solumedrol and will likely require a repeat lumbar puncture with MS panel (I was unable to find prior MS panel in her chart). I have also asked staff to obtain full office note from her Ophthalmologist to confirm. I have also recommended she follow up with Neurophthalmology; new referral placed today.     Plan:  - Continue Topamax 50 mg once daily  - Continue with vestibular/balance therapy  - Consider cognitive/speech therapy   - Proceed with updated MRI Brain and Orbit  - See Neuro-Ophthalmology (will ask social workers to assist with this)  - After MRI is back will proceed with updated lumbar puncture; will hold Topiramate at least 1 week prior  - Follow up in 8 weeks     Pseudotumor cerebri  Patient with suspected IIH in the setting of weight gain with worsening headaches and significant response to Topiramate. Review of record shows opening pressure recorded on prior 2018 LP was 23 cm H20, which is not overly convincing of IIH. She continues on Topiramate 50 mg once daily, as she states higher doses caused SI. She reports currently her headaches are under good control, occurring maybe twice per month. However she is having more cognitive difficulty with word finding and confusion. It is unclear if this is related to Topiramate or other etiology. Will proceed with updated MRI  Brain to assess for white matter lesion stability. We did discuss she could consider cognitive/speech therapy. If felt to be related to Topiramate, we could consider discontinuing and discussing alternatives. She will follow up in 8 weeks with a decision at that time.    CNS demyelination (HCC)  Patient with cerebral white matter changes, with prior work up ruling out MS per chart review. Unfortunately, I cannot find CSF MS panel results in her chart today. 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 We discussed optic neuritis in the setting of white matter lesions is concerning for MS. Will repeat MRI Brain and Orbit to assess for optic neuritis or new or enhancing lesions concerning for MS. Pending results, will consider repeat lumbar puncture.        Diagnoses and all orders for this visit:    CNS demyelination (HCC)  -     MRI brain and orbits wo and w contrast; Future    Pseudotumor cerebri  -     MRI brain and orbits wo and w contrast; Future    Optic neuritis  -     MRI brain and orbits wo and w contrast; Future         I have spent a total time of 45 minutes on 06/13/24 in caring for this patient including Diagnostic results, Prognosis, Risks and benefits of tx options, Instructions for management, Patient and family education, Importance of tx compliance, Risk factor reductions, Impressions, Documenting in the medical record, Reviewing / ordering tests, medicine, procedures  , and Obtaining or reviewing history  .      Subjective:    JAZMIN Multani is a 54 year old female who has followed with Dr. Harris, last seen 2/14/2024. She is known to the practice for intractable headache, thought to be related to IIH in the setting of weight gain with worsening headaches and significant response to Topiramate. Review of record shows opening pressure recorded on prior 2018 LP was 23 cm  "H20. In the past she has had MRI brain with white matter lesions concerning for demyelination, although MS was ruled out.    She returns today and tells me she was seen by Ophthalmology 5/14/2024 and was told her left optic nerve was inflamed. She states she was not directed to the ER for treatment but was told she should follow up with [facility]. Unfortunately, she tells me her insurance does not cover this provider so she has not been seen yet. We do not have notes from her Ophthalmologist to review today. She did bring in her referral slip to [facility], but this does not mention any optic nerve inflammation, only states IOP 22 OS/OD.    Aside from this she tells me she is very concerned because her memory and speech are worse. She described difficulty with word finding, using the wrong words in conversation and has become \"turned around\" in the grocery store. She states her balance continues to be poor but she is attending balance therapy and overall feels there has been some improvement. She reports numbness and tingling in her fingers of both hands (L>R), left sided weakness- describes feeling like she is leaning to the left when she walks. She denies any dizziness or double vision. She continued with blurred vision at times in both eyes. She also intermittently notes in peripheral vision back squares. She denies loss of vision. She states sometimes she has pain behind both eyes but this is not often. She states she once had an episode where vision appeared to be in slow motion. She denies any changes in bowel or bladder.   She states she decreased Topiramate to 50 mg once daily; higher dosage was causing SI. Despite this she reports headaches are a lot better; maybe 2 times per month. She does have sumatriptan but has only used it once since she was last seen. She does take Excedrin tension and this works for the most part.  Taking methotrexate; stopped Humira per " "Rheumatologist prescribed for Psorias and RA.    The following portions of the patient's history were reviewed and updated as appropriate: allergies, current medications, past family history, past medical history, past social history, past surgical history, and problem list.         Objective:    Blood pressure 120/56, pulse 81, temperature (!) 97.1 °F (36.2 °C), temperature source Temporal, height 5' 6\" (1.676 m), weight 92.5 kg (203 lb 14.4 oz).    Neurological Exam  On neurological examination patient is alert, awake, oriented and in no distress. Speech is fluent without dysarthria or aphasia, although occasionally she does pause in conversation appearing to search for words and at times unable to find word. Cranial nerves 2-12 were symmetrically intact bilaterally. She reports left eye pain (tension/tightness) with EOMs. Pupils are ERRLA. No evidence of any focal weakness or sensory loss in the upper or lower extremities. Motor testing reveals 5-/5 strength of the bilateral upper and lower extremities.There was no pronator drift.  No fasciculations present. No abnormal involuntary movements. Finger- to-nose reveals no tremor or ataxia and intact proprioceptive function, no dysmetria was noted. Rapid alternating movement normal. Sensation was intact to vibration, light touch, and temperature in bilateral upper and lower extremities, with the exception of perceived diminished temperature in the LUE compared contralaterally. Deep tendon reflexes were 2+ and symmetric in the bilateral upper and lower extremities. She is able to rise easily without assistance from a seated position. Casual gait is slow, otherwise with normal stance and arm swing. She does have imbalance with turning quickly. She is able to ambulate without her cane in office, although she does ambulate with a cane at baseline. She was hesitant to trial tandem gait but was able to complete successfully with little to no difficulty. Romberg is absent. "         ROS:    Review of Systems   Constitutional:  Negative for appetite change, fatigue and fever.   HENT: Negative.  Negative for hearing loss, tinnitus, trouble swallowing and voice change.    Eyes:  Positive for visual disturbance. Negative for photophobia and pain.        Reports recent abnormal eye exam   Respiratory: Negative.  Negative for shortness of breath.    Cardiovascular: Negative.  Negative for palpitations.   Gastrointestinal: Negative.  Negative for nausea and vomiting.   Endocrine: Negative.  Negative for cold intolerance.   Genitourinary: Negative.  Negative for dysuria, frequency and urgency.   Musculoskeletal:  Negative for back pain, gait problem, myalgias, neck pain and neck stiffness.   Skin: Negative.  Negative for rash.   Allergic/Immunologic: Negative.    Neurological:  Positive for speech difficulty, numbness and headaches. Negative for dizziness, tremors, seizures, syncope, facial asymmetry, weakness and light-headedness.   Hematological: Negative.  Does not bruise/bleed easily.   Psychiatric/Behavioral:  Positive for confusion and decreased concentration. Negative for hallucinations and sleep disturbance.    All other systems reviewed and are negative.    Reviewed ROS as entered by medical assistant.

## 2024-06-13 NOTE — PROGRESS NOTES
Ambulatory Visit  Name: Elena Multani      : 1970      MRN: [id number]  Encounter Provider: CHARITO Mills  Encounter Date: 2024   Encounter department: [facility]    Assessment & Plan   {There are no diagnoses linked to this encounter. (Refresh or delete this SmartLink)}    History of Present Illness   {Disappearing Hyperlinks I Encounters * My Last Note * Since Last Visit * History :32411}  Elena Multani is a 54 y.o. female who presents ***    Review of Systems  {Select to Display PMH (Optional):86727}  Objective   {Disappearing Hyperlinks   Review Vitals * Enter New Vitals * Results Review * Labs * Imaging * Cardiology * Procedures * Lung Cancer Screening :61778}  LMP  (LMP Unknown)     Physical Exam  Administrative Statements {Disappearing Hyperlinks I  Level of Service * PCMH/PCSP:31751}  {Time Spent Statement (Optional):44053}

## 2024-06-13 NOTE — PATIENT INSTRUCTIONS
- Continue Topamax 50 mg once daily  - Continue with vestibular/balance therapy  - Consider cognitive/speech therapy   - Proceed with updated MRI Brain and Orbit  - See Neuro-Ophthalmology (will ask social workers to assist with this)  - After MRI is back will proceed with updated lumbar puncture; will hold Topiramate at least 1 week prior  - Follow up in 8 weeks

## 2024-06-14 ENCOUNTER — PATIENT MESSAGE (OUTPATIENT)
Dept: NEUROLOGY | Facility: CLINIC | Age: 54
End: 2024-06-14

## 2024-06-14 ENCOUNTER — APPOINTMENT (OUTPATIENT)
Dept: PHYSICAL THERAPY | Facility: REHABILITATION | Age: 54
End: 2024-06-14
Payer: COMMERCIAL

## 2024-06-14 PROBLEM — H46.9 OPTIC NEURITIS: Status: ACTIVE | Noted: 2024-06-14

## 2024-06-14 NOTE — ASSESSMENT & PLAN NOTE
Patient reports recently having an Ophthalmologist exam concerning for left optic nerve inflammation. We discussed the presence of optic neuritis in the setting of multiple white matter lesions, is more concerning for MS. It is unclear why she was not sent to the ER immediately for suspected optic neuritis as this could result in permanent vision loss. Today she continues to describe visual disturbances and eye pain with extraocular eye motions. To assess further I have asked her to complete a stat MRI Brain and Orbit. Pending results will treat with IV Solumedrol and will likely require a repeat lumbar puncture with MS panel (I was unable to find prior MS panel in her chart). I have also asked staff to obtain full office note from her Ophthalmologist to confirm. I have also recommended she follow up with Neurophthalmology; new referral placed today.     Plan:  - Continue Topamax 50 mg once daily  - Continue with vestibular/balance therapy  - Consider cognitive/speech therapy   - Proceed with updated MRI Brain and Orbit  - See Neuro-Ophthalmology (will ask social workers to assist with this)  - After MRI is back will proceed with updated lumbar puncture; will hold Topiramate at least 1 week prior  - Follow up in 8 weeks

## 2024-06-14 NOTE — ASSESSMENT & PLAN NOTE
Patient with suspected IIH in the setting of weight gain with worsening headaches and significant response to Topiramate. Review of record shows opening pressure recorded on prior 2018 LP was 23 cm H20, which is not overly convincing of IIH. She continues on Topiramate 50 mg once daily, as she states higher doses caused SI. She reports currently her headaches are under good control, occurring maybe twice per month. However she is having more cognitive difficulty with word finding and confusion. It is unclear if this is related to Topiramate or other etiology. Will proceed with updated MRI Brain to assess for white matter lesion stability. We did discuss she could consider cognitive/speech therapy. If felt to be related to Topiramate, we could consider discontinuing and discussing alternatives. She will follow up in 8 weeks with a decision at that time.

## 2024-06-14 NOTE — ASSESSMENT & PLAN NOTE
Patient with cerebral white matter changes, with prior work up ruling out MS per chart review. Unfortunately, I cannot find CSF MS panel results in her chart today. 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 We discussed optic neuritis in the setting of white matter lesions is concerning for MS. Will repeat MRI Brain and Orbit to assess for optic neuritis or new or enhancing lesions concerning for MS. Pending results, will consider repeat lumbar puncture.

## 2024-06-18 ENCOUNTER — OFFICE VISIT (OUTPATIENT)
Dept: PHYSICAL THERAPY | Facility: REHABILITATION | Age: 54
End: 2024-06-18
Payer: COMMERCIAL

## 2024-06-18 DIAGNOSIS — G82.20 PARAPARESIS OF BOTH LOWER LIMBS (HCC): ICD-10-CM

## 2024-06-18 DIAGNOSIS — R29.6 FREQUENT FALLS: ICD-10-CM

## 2024-06-18 DIAGNOSIS — M54.12 CERVICAL RADICULOPATHY AT C7: Primary | ICD-10-CM

## 2024-06-18 DIAGNOSIS — M62.838 MUSCLE SPASMS OF BOTH LOWER EXTREMITIES: ICD-10-CM

## 2024-06-18 PROCEDURE — 97110 THERAPEUTIC EXERCISES: CPT

## 2024-06-18 PROCEDURE — 97112 NEUROMUSCULAR REEDUCATION: CPT

## 2024-06-18 NOTE — PROGRESS NOTES
Daily Note     Today's date: 2024  Patient name: Elena Multani  : 1970  MRN: [id number]  Referring provider: Carlos Cho  Dx:   Encounter Diagnosis     ICD-10-CM    1. Cervical radiculopathy at C7  M54.12       2. Muscle spasms of both lower extremities  M62.838       3. Paraparesis of both lower limbs (HCC)  G82.20       4. Frequent falls  R29.6                      Subjective: Pt reports she missed her last session due to being sent for an MRI 2* concern of optic nerve inflammation, which came back normal. She has a referral to the neuro ophthalmologist, however has not scheduled that appointment yet. She continues to note progress with physical therapy.    Objective: See treatment diary below.    Assessment: Pt with good tolerance to treatment and current program as listed below. Demonstrates improvement in time and accuracy with LOS on Biodex. Appropriate challenge with static balance. Pt fatigues quickly with scapular strengthening. Will continue to progress as able. Pt would benefit from continued skilled PT to improve current deficits and maximize function.    Plan: Continue per plan of care.  Progress treatment as tolerated.       Precautions:   Patient Active Problem List   Diagnosis    Other abnormal findings on diagnostic imaging of central nervous system    Weakness of both lower extremities    Dysphagia    Numbness in both legs    Other spondylosis with radiculopathy, lumbar region    Muscle spasms of both lower extremities    Pernicious anemia    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    Chronic neck pain    Numbness and tingling in left arm    Real time reverse transcriptase PCR positive for COVID-19 virus    Dizziness and giddiness    Persistent postural-perceptual dizziness    Paraparesis of both lower limbs (HCC)    Cervical radiculopathy at C7    CNS demyelination (HCC)    Vestibular dysfunction    Transient vision disturbance of both eyes    Pseudotumor cerebri         Daily  "Treatment Diary      Assessment  5/15  5/22  5/24  5/28  5/30  6/7  6/11  6/18       Eval/Reval  MD                     FOTO         **         **   Manuals                                                     Prescribed POC    Pt Education  MD                      HEP Issued/Updated  MD                      Pulleys w/Cervical MHP   5\" x 3 min  5\"x 5 min  5\"x 5 min  5\"x 5 min  5\"x 5 min  5\"x 5 min  5\"x5 min        UBE - Retro  w/Cervical MHP   L1x3'  L1x5'  L3x5'  L3x5'  L3x5'  L3x5' L3x5'        H/L Pec Stretch  15\"x3 15\"x3  15\"x3  20\"x3                H/L Upper Thoracic Extension Stretch  15\"x3 15\"x3  15\"x3  20\"x3                H/L [place] - Pain-free ROM Only 5\"x10 NV 5\"x5 5\"x10          Cervical Isometrics: Flexion, SB, Rotation  5\"  1x10 ea 5\"  1x10 ea  5\"  1x10 ea  5\"  1x10 ea                Seated Retractions  5\"  1x10 5\"  1x10   5\"  2x10  5\"  2x10    GTB  5\"1x10  GTB  5\"1x10  GTB  5\" 1x10        TB shoulder ext           OTB  5\"1x10    GTB  5\"1x10  GTB  5\" 1x10                    Balance/LE Strength & Endurance:             Bike    L1x5' L1x5' L1x5' L1 5' L1x5'     Biodex LOS    Level 2  Trial 1:  53\", 75%    Trial 2:  52\", 80%    Trial 3:  45\", 84% Level 2  Trial 1:  54\", 72%    Trial 2:  52\", 76%    Trial 3:  49\", 80% Level 2  Trial 1:  42\", 86%    Trial 2:  47\", 70%    Trial 3:  57\", 69% Level 2  Trial 1:  54\", 78%    Trial 2:  48\", 75%    Trial 3:  41\", 84% Level 2  Trial 1:  43\", 84%    Trial 2:  43\", 84%    Trial 3:  40\", 89%       Biodex - Maze    Level 2  Trial 1:   55\", 92%    Trial 2:  52\", 94%    Trial 3:  58\", 92% Level 2  Trial 1:   1'1\"\", 92%    Trial 2:  1'2\"\", 98%    Trial 3:  1'4\", 94% Level 2  Trial 1:  59\", 94%    Trial 2:  1'02\", 96%    Trial 3:  1'03\", 90% Level 2  Trial 1:  49\", 96%    Trial 2:  49\", 96%    Trial 3:  50\", 94% Level 2  Trial 1:  59\", 76%    Trial 2:  54\", 82%    Trial 3:  55\", 80%     FTEO - Airex     30\"x3  CS 30\"x3  CS 30\"x3  CS 30\"x3  CS     FAEC - Airex     " "30\"x3  CS 30\"x3  CS 30\"x3  CS 30\"x3  CS     Tandem Balance     30\"x2 ea  CS 30\"x2 ea  CS 30\"x2 ea  CS 30\"x2  CS     Tandem Walking             Sidestepping + TB     Green   4 laps Green   4 laps Green   4 laps Green  4 laps     Step Up & Over             Step Ups             Step Downs             Gait with Head Turns - Up/Down & Side/Side             Hurdles - fwd/lat             Obstacle Course                                          MHP C-Spine w/Pulleys & UBE    + w/TE  15'  + w/TE  15'  + w/TE  15'  10'  10'  10' 10'                    QR Code:  NV    [employer] HEP:  Access Code: [email or web address]  URL: [email or web address]  Date: 05/15/2024  Prepared by: Kalpana Dobbs    Exercises  - Seated Scapular Retraction  - 1 x daily - 10 reps - 5 hold  - Supine Isometric Neck Sidebend  - 1 x daily - 1 sets - 10 reps - 5 hold  - Supine Isometric Neck Rotation  - 1 x daily - 1 sets - 10 reps - 5 hold  - Supine Isometric Neck Flexion  - 1 x daily - 1 sets - 10 reps - 5 hold  - [place]  - 1 x daily - 10 reps - 5 hold  - Supine Pectoralis Stretch  - 1 x daily - 3 reps - 15 hold  - Supine Chest Stretch with Elbows Bent  - 1 x daily - 3 reps - 15 hold    Patient Education  - Flat Back Posture  - Heat         "

## 2024-06-20 ENCOUNTER — EVALUATION (OUTPATIENT)
Dept: PHYSICAL THERAPY | Facility: REHABILITATION | Age: 54
End: 2024-06-20
Payer: COMMERCIAL

## 2024-06-20 DIAGNOSIS — M62.838 MUSCLE SPASMS OF BOTH LOWER EXTREMITIES: ICD-10-CM

## 2024-06-20 DIAGNOSIS — R29.6 FREQUENT FALLS: ICD-10-CM

## 2024-06-20 DIAGNOSIS — G82.20 PARAPARESIS OF BOTH LOWER LIMBS (HCC): ICD-10-CM

## 2024-06-20 DIAGNOSIS — M54.12 CERVICAL RADICULOPATHY AT C7: Primary | ICD-10-CM

## 2024-06-20 PROCEDURE — 97164 PT RE-EVAL EST PLAN CARE: CPT | Performed by: PHYSICAL THERAPIST

## 2024-06-20 PROCEDURE — 97112 NEUROMUSCULAR REEDUCATION: CPT | Performed by: PHYSICAL THERAPIST

## 2024-06-20 PROCEDURE — 97110 THERAPEUTIC EXERCISES: CPT | Performed by: PHYSICAL THERAPIST

## 2024-06-20 NOTE — PROGRESS NOTES
PT Re-Evaluation     Today's date: 2024  Patient name: Elena Multani  : 1970  MRN: [id number]  Referring provider: Carlos Cho  Dx:   Encounter Diagnosis     ICD-10-CM    1. Cervical radiculopathy at C7  M54.12       2. Muscle spasms of both lower extremities  M62.838       3. Paraparesis of both lower limbs (HCC)  G82.20       4. Frequent falls  R29.6         5. Muscle spasms of both lower extremities  M62.838       6. Paraparesis of both lower limbs (HCC)  G82.20                        Assessment  Impairments: abnormal coordination, abnormal or restricted ROM, activity intolerance, impaired physical strength, lacks appropriate home exercise program, pain with function, poor posture , poor body mechanics and unable to perform ADL  Symptom irritability: high    Assessment details: Pt is a pleasant 54 y.o. female presenting to outpatient physical therapy with left cervical radiculopathy at C7, as well as attending outpatient physical therapy with muscle spasms of both lower extremities, paraparesis of both lower limbs and frequent falls with complex past medical history. Pt continues with cervical and left upper extremity pain, impaired posture, decreased cervical and left shoulder range of motion, decreased deep cervical and left upper extremity strength, impaired ability to perform ADLs, and decreased tolerance to activity.  pt reports overall improvement in her balance and tolerance to ADLs within her home as well as decreased pain. Objectively, she demonstrates improvement in bilateral lower extremity and core strength, balance, gait symmetry/safety and tolerance to activity.  She continues with deficits in these areas as well as decreased right hip range of motion, decreased bilateral lower extremity flexibility. Pt will benefit from continued skilled PT intervention in order to address her remaining limitations and to restore maximal function.     Understanding of Dx/Px/POC: good    "  Prognosis: good    Goals  Short-Term Goals (4 weeks)   1. Patient will decrease worst rating of pain by 25% to improve quality of life.  2. Patient will increase strength by 1/2 MMT to improve quality of life with improved efficiency of daily activities.  3. Patient will improve ROM by 25% indicating improved mobility of affected area.    Long-Term Goals (8 weeks)   1. Patient will decrease pain by 50% at worst in comparison to IE indicating significant reduction in pain and improved quality of life.  2. Patient will demonstrate strength WFL compared to IE levels indicating ability to independently manage pain symptoms to accomplish daily activities.   3. Patient will be independent with HEP with good form accomplished.      Plan  Patient would benefit from: PT eval and skilled PT  Planned modality interventions: cryotherapy and thermotherapy: hydrocollator packs    Planned therapy interventions: IADL retraining, body mechanics training, flexibility, functional ROM exercises, home exercise program, neuromuscular re-education, manual therapy, postural training, strengthening, stretching, therapeutic activities, therapeutic exercise and joint mobilization    Frequency: 2x week  Duration in weeks: 12  Treatment plan discussed with: patient        Subjective Evaluation    History of Present Illness  Mechanism of injury: 6/20/24 Re-Evaluation:   Pt has been consistently attending 8 sessions for chronic history of neck pain with with previous C4-C6 ACDF C4-C6. Pt reports improvement in cervical ROM, however, reports no changed with left UE radicular symptoms and pain levels. Pt reports she has not made f/u with shoulder surgeon yet.     Pain Location: central and left cervical spine with radiation into left upper trapezius region, left upper chest region and into left arm and hand    Pain Type: cervical spine = aching; left upper chest and arm = \"nerve\" shooting    Pain Intensity:  Current: 7  Best: 7  Worst: " "7    (Improved)  (Unchanged)    Pt reports increased pain and/or difficulty with: pushing head back (Unchanged), looking up (Improved), side-bending (Improved), (Unchanged) turning head to either side (Improved), bringing arms overhead (Improved), lifting (Unchanged), cooking (Unchanged), cleaning (Unchanged). Pt reports sleep disturbance secondary to pain waking up repeatedly through the night.(Unchanged)    Pt reports decreased pain with: nothing.    6/20/24 Re-Evaluation:  Pt has been fairly consistently attending PT  for chronic progressive bilateral LE weakness, paraparesis and frequent falls with complex PMH. She reports she feels decreased pain and overall improvement in her balance and ability to perform ADLs within her home. She reports she has been trying to walk more without SPC for shorter distances.    Pain Location: bilateral LE aching, tingling, vibrating    Pain Type: bilateral anterior thigh    Pain Intensity:  Current: 2  Best: 2  Worst: 5    Pt reports increased pain and/or difficulty with: sit-stand transfers, immediate standing balance, walking, stairs.    Pt reports decreased pain with: rest, medication    Initial Evaluation:   Pt is a 54 year-old right handed female presenting to PT with chronic history of neck pain with previous C4-C6 ACDF C4-C6 in 2012 performed by Dr. Conde. Pt reports she had good recovery up until approximately 2 years ago when she had left shoulder sugery - previously had hemiarthroplasty which was converted to complete arthroplasty. Pt feels her shoulder has \"never been the same\" since this surgery and she feels her neck became aggravated because of this. Pt reports she went to Dr. Cho for evaluation, x-rays performed with results as follows:  4/2/24 Cervical X-Ray: Evidence of ACDF in the cervical spine without hardware or complication. No acute fracture or suspicious osseous lesion. Evidence of degenerative arthritis in the cervical spine as noted with the " "C6-7 level most   affected. Mild alignment abnormalities as noted.     Pt referred to OPPT.    Pain Location: central and left cervical spine with radiation into left upper trapezius region, left upper chest region and into left arm and hand    Pain Type: cervical spine = aching; left upper chest and arm = \"nerve\" shooting    Pain Intensity:  Current: 4  Best: 4  Worst: 10    Pt reports increased pain and/or difficulty with: pushing head back, looking up, side-bending, turning head to either side, bringing arms overhead, lifting, cooking, cleaning. Pt reports sleep disturbance secondary to pain waking up repeatedly through the night.    Pt reports decreased pain with: nothing.      Initial Evaluation:  Pt is a 53 year-old right-handed female with complex PMH presenting to PT with 8 year history of progressive bilateral LE weakness, paraparesis and frequent falls. She reports she began having issues in 2015. She reports she was initially diagnosed with RA around 2012. She reports she had C4-C6 ACDF performed by Dr. Conde in 2012, left TSR in 2009 and 2021, both performed by Dr. Watson at [facility]. She reports she receives bi-weekly Humera injections. She is unable to take NSAIDS due to her medical history. Pt follows with pain management and neurology. Pt reports she has had 3 significant falls in the past year, her most recent fall was on 4/28/23, in which she sustained meniscal tear with MFC fracture, underwent surgery for this on 9/29/23. Pt also underwent left tympanostomy tube replacement 1/12/23 performed by Dr. Campos.    Pt reports her B LE weakness has progressively worsened over the past 2 months, as well as an aching/vibrating sensation in both of her legs.    She reports she has the sensation that she cannot feel where her legs are when she is walking and going up/down stairs.     Pt is on disability.    Pain Location: bilateral LE aching, tingling, vibrating    Pain Type: bilateral " anterior thigh    Pain Intensity:  Current: 2  Best: 2  Worst: 8    Pt reports increased pain and/or difficulty with: sit-stand transfers, immediate standing balance, walking, stairs.    Pt reports decreased pain with: rest, medication              Recurrent probem    Quality of life: good    Patient Goals  Patient goals for therapy: increased strength, independence with ADLs/IADLs, decreased pain and increased motion      Diagnostic Tests  X-ray: abnormal        Objective     Concurrent Complaints  Positive for night pain and disturbed sleep.     Postural Observations    Additional Postural Observation Details  Moderate forward head, bilateral rounded shoulders, increased thoracic kyphosis.     Neurological Testing     Sensation   Cervical/Thoracic   Left   Diminished: light touch    Right   Intact: light touch    Comments   Left light touch: left upper lateral arm and clavicular region - C5 dermatome.     Reflexes   Left   Biceps (C5/C6): normal (2+)  Triceps (C7): normal (2+)    Right   Biceps (C5/C6): normal (2+)  Triceps (C7): normal (2+)    Active Range of Motion   Cervical/Thoracic Spine       Cervical    Flexion: 40 degrees  with pain  Extension: 31 degrees     with pain  Left lateral flexion: 15 degrees     with pain  Right lateral flexion: 16 degrees     with pain  Left rotation: 32 degrees with pain  Right rotation: 41 degrees    with pain    Joint Play     Hypomobile: T1, T2, T3, T4, T5 and T6     Strength/Myotome Testing     Left Shoulder     Planes of Motion   Flexion: 4-   Abduction: 4-   External rotation at 0°: 4-   Internal rotation at 0°: 4-     Isolated Muscles   Lower trapezius: 3   Middle trapezius: 3     Right Shoulder     Planes of Motion   Flexion: 4+   Abduction: 4+   External rotation at 0°: 4+   Internal rotation at 0°: 4+     Isolated Muscles   Lower trapezius: 4-   Middle trapezius: 4-     Left Elbow   Flexion: 4-  Extension: 4-    Right Elbow   Flexion: 5  Extension: 5    Tests    Cervical   Positive neck flexor muscle endurance test.             Concurrent Complaints  Negative for night pain, disturbed sleep, bladder dysfunction, bowel dysfunction and saddle (S4) numbness    Neurological Testing     Sensation     Lumbar   Left   Intact: light touch    Right   Intact: light touch    Reflexes   Left   Patellar (L4): normal (2+)  Achilles (S1): normal (2+)    Right   Patellar (L4): normal (2+)  Achilles (S1): normal (2+)    Active Range of Motion     Lumbar   Flexion:  WFL  Extension:  Restriction level: minimal  Left lateral flexion:  WFL  Right lateral flexion:  WFL  Left rotation:  WFL  Right rotation:  WFL  Left Hip   Flexion: WFL  Extension: 0 degrees   Abduction: WFL  External rotation (prone): WFL  Internal rotation (prone): WFL    Right Hip   Flexion: WFL  Extension: 0 degrees   Abduction: WFL  External rotation (prone): 15 degrees   Internal rotation (prone): 10 degrees     Strength/Myotome Testing     Left Hip   Planes of Motion   Flexion: 4+  Extension: 4  Abduction: 4  Adduction: 4+  External rotation: 4  Internal rotation: 4    Right Hip   Planes of Motion   Flexion: 5  Extension: 4  Abduction: 4+  Adduction: 4+  External rotation: 4+  Internal rotation: 4+    Left Knee   Flexion: 5  Extension: 5    Right Knee   Flexion: 5  Extension: 5    Left Ankle/Foot   Dorsiflexion: 5  Plantar flexion: 5    Right Ankle/Foot   Dorsiflexion: 5  Plantar flexion: 5    Tests     Left Hip   Modified Jorge Alberto: Positive.   90/90 SLR: Positive.     Right Hip   Modified Jorge Alberto: Positive.   90/90 SLR: Positive.     Biodex Balance Assessment  Direction Control 12/26/23 - Level 1 2/12/24 - Level 2 4/17/24 - Level 3 6/20/24 - Level 3 Goal   Overall 39 88 77 82 85   Forward 46 90 76 84 85   Backward 54 79 67 75 85   Left  43 95 91 90 85   Right  48 93 74 79 85   Forward/Left 38 95 80 82 85   Forward/Right 41 81 82 83 85   Backward/Left 31 87 86 88 85   Backward/Right 49 94 76 81 85          Precautions:  "  Patient Active Problem List   Diagnosis    Other abnormal findings on diagnostic imaging of central nervous system    Weakness of both lower extremities    Dysphagia    Numbness in both legs    Other spondylosis with radiculopathy, lumbar region    Muscle spasms of both lower extremities    Pernicious anemia    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    Chronic neck pain    Numbness and tingling in left arm    Real time reverse transcriptase PCR positive for COVID-19 virus    Dizziness and giddiness    Persistent postural-perceptual dizziness    Paraparesis of both lower limbs (HCC)    Cervical radiculopathy at C7    CNS demyelination (HCC)    Vestibular dysfunction    Transient vision disturbance of both eyes    Pseudotumor cerebri    Optic neuritis         Daily Treatment Diary      Assessment  5/15  5/22  5/24  5/28  5/30  6/7  6/11  6/18  6/20     Eval/Reval  MD                MD     FOTO         **         **   Manuals                                                     Prescribed POC    Pt Education  MD                      HEP Issued/Updated  MD Clancy w/Cervical MHP   5\" x 3 min  5\"x 5 min  5\"x 5 min  5\"x 5 min  5\"x 5 min  5\"x 5 min  5\"x5 min  D/C      UBE - Retro  w/Cervical MHP   L1x3'  L1x5'  L3x5'  L3x5'  L3x5'  L3x5' L3x5'  L3x5'      H/L Pec Stretch  15\"x3 15\"x3  15\"x3  20\"x3                H/L Upper Thoracic Extension Stretch  15\"x3 15\"x3  15\"x3  20\"x3                H/L [place] - Pain-free ROM Only 5\"x10 NV 5\"x5 5\"x10          Cervical Isometrics: Flexion, SB, Rotation  5\"  1x10 ea 5\"  1x10 ea  5\"  1x10 ea  5\"  1x10 ea                Seated Retractions  5\"  1x10 5\"  1x10   5\"  2x10  5\"  2x10    GTB  5\"1x10  GTB  5\"1x10  GTB  5\" 1x10        TB shoulder ext           OTB  5\"1x10    GTB  5\"1x10  GTB  5\" 1x10                    Balance/LE Strength & Endurance:             Bike    L1x5' L1x5' L1x5' L1 5' L1x5' L1x5'    Biodex LOS    Level 2  Trial 1:  53\", 75%    Trial 2:  52\", " "80%    Trial 3:  45\", 84% Level 2  Trial 1:  54\", 72%    Trial 2:  52\", 76%    Trial 3:  49\", 80% Level 2  Trial 1:  42\", 86%    Trial 2:  47\", 70%    Trial 3:  57\", 69% Level 2  Trial 1:  54\", 78%    Trial 2:  48\", 75%    Trial 3:  41\", 84% Level 2  Trial 1:  43\", 84%    Trial 2:  43\", 84%    Trial 3:  40\", 89%       Biodex - Maze    Level 2  Trial 1:   55\", 92%    Trial 2:  52\", 94%    Trial 3:  58\", 92% Level 2  Trial 1:   1'1\"\", 92%    Trial 2:  1'2\"\", 98%    Trial 3:  1'4\", 94% Level 2  Trial 1:  59\", 94%    Trial 2:  1'02\", 96%    Trial 3:  1'03\", 90% Level 2  Trial 1:  49\", 96%    Trial 2:  49\", 96%    Trial 3:  50\", 94% Level 2  Trial 1:  59\", 76%    Trial 2:  54\", 82%    Trial 3:  55\", 80%     FTEO - Airex     30\"x3  CS 30\"x3  CS 30\"x3  CS 30\"x3  CS 30\"x3  CS    FAEC - Airex     30\"x3  CS 30\"x3  CS 30\"x3  CS 30\"x3  CS 30\"x3  CS    Tandem Balance     30\"x2 ea  CS 30\"x2 ea  CS 30\"x2 ea  CS 30\"x2  CS 30\"x3  CS    Tandem Walking             Sidestepping + TB     Green   4 laps Green   4 laps Green   4 laps Green  4 laps Blue  4 laps    Step Up & Over             Step Ups             Step Downs             Gait with Head Turns - Up/Down & Side/Side             Hurdles - fwd/lat             Obstacle Course                                          MHP C-Spine w/Pulleys & UBE    + w/TE  15'  + w/TE  15'  + w/TE  15'  10'  10'  10' 10' 10' w/UBE & Bike                    QR Code:  NV    [facility] HEP:  Access Code: [email or web address]  URL: [email or web address]  Date: 05/15/2024  Prepared by: Kalpana Dobbs    Exercises  - Seated Scapular Retraction  - 1 x daily - 10 reps - 5 hold  - Supine Isometric Neck Sidebend  - 1 x daily - 1 sets - 10 reps - 5 hold  - Supine Isometric Neck Rotation  - 1 x daily - 1 sets - 10 reps - 5 hold  - Supine Isometric Neck Flexion  - 1 x daily - 1 sets - 10 reps - 5 hold  - [place]  - 1 x daily - 10 reps - 5 hold  - Supine Pectoralis Stretch  - 1 x daily - 3 reps - 15 hold  - " Supine Chest Stretch with Elbows Bent  - 1 x daily - 3 reps - 15 hold    Patient Education  - Flat Back Posture  - Heat

## 2024-06-25 ENCOUNTER — APPOINTMENT (OUTPATIENT)
Dept: PHYSICAL THERAPY | Facility: REHABILITATION | Age: 54
End: 2024-06-25
Payer: COMMERCIAL

## 2024-06-27 ENCOUNTER — OFFICE VISIT (OUTPATIENT)
Dept: PHYSICAL THERAPY | Facility: REHABILITATION | Age: 54
End: 2024-06-27
Payer: COMMERCIAL

## 2024-06-27 DIAGNOSIS — R29.6 FREQUENT FALLS: ICD-10-CM

## 2024-06-27 DIAGNOSIS — G82.20 PARAPARESIS OF BOTH LOWER LIMBS (HCC): ICD-10-CM

## 2024-06-27 DIAGNOSIS — M54.12 CERVICAL RADICULOPATHY AT C7: Primary | ICD-10-CM

## 2024-06-27 DIAGNOSIS — M62.838 MUSCLE SPASMS OF BOTH LOWER EXTREMITIES: ICD-10-CM

## 2024-06-27 PROCEDURE — 97110 THERAPEUTIC EXERCISES: CPT

## 2024-06-27 PROCEDURE — 97112 NEUROMUSCULAR REEDUCATION: CPT

## 2024-06-27 NOTE — PROGRESS NOTES
"Daily Note     Today's date: 2024  Patient name: Elena Multani  : 1970  MRN: [id number]  Referring provider: Carlos Cho  Dx:   Encounter Diagnosis     ICD-10-CM    1. Cervical radiculopathy at C7  M54.12       2. Muscle spasms of both lower extremities  M62.838       3. Paraparesis of both lower limbs (HCC)  G82.20       4. Frequent falls  R29.6                      Subjective: Pt comes to therapy with frustrations over changes that she feels have occurred in the last few days. She states since stopping taking Topamax she has noticed some symptoms of increased intracranial pressure, noting she can \"hear\" movement in her ears and sees a \"shadow\" in her eyes with she feels her heart beat. She also reports some bowel and bladder changes, noting incontinence a few times without any urge over the past few days.      Objective: See treatment diary below  BP taken in sitting after performing about 20 mins of exercise with about 5 mins of rest: 140/90      Assessment: Pt with fair tolerance to treatment. Noticeable difference in balance, particularly with eyes closed. No assist required from PTA to maintain balance, however increased multidirectional sway and subjective report of increased difficulty today. Advised pt to contact PCP and spine doctor immediately upon leaving session today based on symptoms and subjective report. She is agreeable to this plan. Pt would benefit from continued skilled PT to improve current deficits and maximize function.      Plan: Continue per plan of care.  Progress treatment as tolerated.       Precautions:   Patient Active Problem List   Diagnosis    Other abnormal findings on diagnostic imaging of central nervous system    Weakness of both lower extremities    Dysphagia    Numbness in both legs    Other spondylosis with radiculopathy, lumbar region    Muscle spasms of both lower extremities    Pernicious anemia    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    Chronic neck " "pain    Numbness and tingling in left arm    Real time reverse transcriptase PCR positive for COVID-19 virus    Dizziness and giddiness    Persistent postural-perceptual dizziness    Paraparesis of both lower limbs (HCC)    Cervical radiculopathy at C7    CNS demyelination (HCC)    Vestibular dysfunction    Transient vision disturbance of both eyes    Pseudotumor cerebri    Optic neuritis         Daily Treatment Diary      Assessment  5/15  5/22  5/24  5/28  5/30  6/7  6/11  6/18  6/20  6/27   Eval/Reval  MD                MD     FOTO         **         **   Manuals                                                     Prescribed POC    Pt Education  MD                      HEP Issued/Updated  MD                      Pulleys w/Cervical MHP   5\" x 3 min  5\"x 5 min  5\"x 5 min  5\"x 5 min  5\"x 5 min  5\"x 5 min  5\"x5 min  D/C      UBE - Retro  w/Cervical MHP   L1x3'  L1x5'  L3x5'  L3x5'  L3x5'  L3x5' L3x5'  L3x5'  L1x5'    H/L Pec Stretch  15\"x3 15\"x3  15\"x3  20\"x3                H/L Upper Thoracic Extension Stretch  15\"x3 15\"x3  15\"x3  20\"x3                H/L [place] - Pain-free ROM Only 5\"x10 NV 5\"x5 5\"x10          Cervical Isometrics: Flexion, SB, Rotation  5\"  1x10 ea 5\"  1x10 ea  5\"  1x10 ea  5\"  1x10 ea                Seated Retractions  5\"  1x10 5\"  1x10   5\"  2x10  5\"  2x10    GTB  5\"1x10  GTB  5\"1x10  GTB  5\" 1x10        TB shoulder ext           OTB  5\"1x10    GTB  5\"1x10  GTB  5\" 1x10                    Balance/LE Strength & Endurance:             Bike    L1x5' L1x5' L1x5' L1 5' L1x5' L1x5' L1x5'   Biodex LOS    Level 2  Trial 1:  53\", 75%    Trial 2:  52\", 80%    Trial 3:  45\", 84% Level 2  Trial 1:  54\", 72%    Trial 2:  52\", 76%    Trial 3:  49\", 80% Level 2  Trial 1:  42\", 86%    Trial 2:  47\", 70%    Trial 3:  57\", 69% Level 2  Trial 1:  54\", 78%    Trial 2:  48\", 75%    Trial 3:  41\", 84% Level 2  Trial 1:  43\", 84%    Trial 2:  43\", 84%    Trial 3:  40\", 89%       Biodex - Maze    Level 2  Trial 1: " "  55\", 92%    Trial 2:  52\", 94%    Trial 3:  58\", 92% Level 2  Trial 1:   1'1\"\", 92%    Trial 2:  1'2\"\", 98%    Trial 3:  1'4\", 94% Level 2  Trial 1:  59\", 94%    Trial 2:  1'02\", 96%    Trial 3:  1'03\", 90% Level 2  Trial 1:  49\", 96%    Trial 2:  49\", 96%    Trial 3:  50\", 94% Level 2  Trial 1:  59\", 76%    Trial 2:  54\", 82%    Trial 3:  55\", 80%     FTEO - Airex     30\"x3  CS 30\"x3  CS 30\"x3  CS 30\"x3  CS 30\"x3  CS 30\"x3  CS   FAEC - Airex     30\"x3  CS 30\"x3  CS 30\"x3  CS 30\"x3  CS 30\"x3  CS 30\"x3  CS   Tandem Balance     30\"x2 ea  CS 30\"x2 ea  CS 30\"x2 ea  CS 30\"x2  CS 30\"x3  CS    Tandem Walking             Sidestepping + TB     Green   4 laps Green   4 laps Green   4 laps Green  4 laps Blue  4 laps    Step Up & Over             Step Ups             Step Downs             Gait with Head Turns - Up/Down & Side/Side             Hurdles - fwd/lat             Obstacle Course                                          MHP C-Spine w/Pulleys & UBE    + w/TE  15'  + w/TE  15'  + w/TE  15'  10'  10'  10' 10' 10' w/UBE & Bike                    QR Code:  NV    [facility] HEP:  Access Code: [email or web address]  URL: [email or web address]  Date: 05/15/2024  Prepared by: Kalpana Dobbs    Exercises  - Seated Scapular Retraction  - 1 x daily - 10 reps - 5 hold  - Supine Isometric Neck Sidebend  - 1 x daily - 1 sets - 10 reps - 5 hold  - Supine Isometric Neck Rotation  - 1 x daily - 1 sets - 10 reps - 5 hold  - Supine Isometric Neck Flexion  - 1 x daily - 1 sets - 10 reps - 5 hold  - [place]  - 1 x daily - 10 reps - 5 hold  - Supine Pectoralis Stretch  - 1 x daily - 3 reps - 15 hold  - Supine Chest Stretch with Elbows Bent  - 1 x daily - 3 reps - 15 hold    Patient Education  - Flat Back Posture  - Heat       "

## 2024-07-23 ENCOUNTER — TELEPHONE (OUTPATIENT)
Age: 54
End: 2024-07-23

## 2024-07-25 ENCOUNTER — OFFICE VISIT (OUTPATIENT)
Dept: NEUROLOGY | Facility: CLINIC | Age: 54
End: 2024-07-25
Payer: COMMERCIAL

## 2024-07-25 VITALS
BODY MASS INDEX: 37.78 KG/M2 | HEART RATE: 74 BPM | HEIGHT: 66 IN | WEIGHT: 235.1 LBS | SYSTOLIC BLOOD PRESSURE: 118 MMHG | DIASTOLIC BLOOD PRESSURE: 82 MMHG | TEMPERATURE: 98 F

## 2024-07-25 DIAGNOSIS — G93.2 PSEUDOTUMOR CEREBRI: Primary | ICD-10-CM

## 2024-07-25 DIAGNOSIS — R32 URINARY INCONTINENCE: ICD-10-CM

## 2024-07-25 PROCEDURE — 99214 OFFICE O/P EST MOD 30 MIN: CPT

## 2024-07-25 RX ORDER — TOPIRAMATE 50 MG/1
50 TABLET, FILM COATED ORAL
Qty: 90 TABLET | Refills: 3 | Status: SHIPPED | OUTPATIENT
Start: 2024-07-25

## 2024-07-25 RX ORDER — ALPRAZOLAM 0.25 MG/1
0.25 TABLET ORAL DAILY PRN
COMMUNITY
Start: 2024-04-24 | End: 2024-10-21

## 2024-07-25 RX ORDER — AMOXICILLIN 500 MG/1
CAPSULE ORAL
COMMUNITY
Start: 2024-04-24

## 2024-07-25 NOTE — PATIENT INSTRUCTIONS
- Resume Topiramate 25 mg at bedtime x 1 week, then 50 mg at bedtime thereafter  - Referral to Urology   - Look up GoodRx for your prescriptions   - Follow up in 3 months (call to schedule)

## 2024-07-25 NOTE — ASSESSMENT & PLAN NOTE
Patient with suspected IIH in the setting of weight gain with worsening headaches and significant response to Topiramate previously. Review of record shows opening pressure recorded on prior 2018 LP was 23 cm H20, which is not overly convincing of IIH. She has never had proven papilledema. Since she has discontinued Topiramate due to concerns about speech and memory, she reports constant headaches and swooshing in her ears. Although she associates increase of symptoms with discontinuation of Topiramate, it is unclear as she discontinued multiple medications abruptly at the same time. Given her significant response to Topiramate in the past we will have her resume Topiramate 50 mg once daily. Although, I have encouraged her to follow up with her other health care providers to discuss the need to restart any of her other medications that she has self discontinued. We discussed the risks of abrupt discontinuation of certain medications and emphasized that she should not do this without discussing with her health care providers first. Recent MRI Brain and orbit was stable with no changes, not concerning for optic neuritis or papilledema. She will follow up for her headaches in 3 months; sooner if needed.    Plan:  - Resume Topiramate 25 mg at bedtime x 1 week, then 50 mg at bedtime thereafter  - Referral to Urology for urinary incontinence/urgency   - Look up GoodRx for your prescriptions   - Follow up in 3 months (call to schedule)

## 2024-07-25 NOTE — PROGRESS NOTES
Patient ID: Elena Multani is a 54 y.o. female.    Assessment/Plan:    Pseudotumor cerebri  Patient with suspected IIH in the setting of weight gain with worsening headaches and significant response to Topiramate previously. Review of record shows opening pressure recorded on prior 2018 LP was 23 cm H20, which is not overly convincing of IIH. She has never had proven papilledema. Since she has discontinued Topiramate due to concerns about speech and memory, she reports constant headaches and swooshing in her ears. Although she associates increase of symptoms with discontinuation of Topiramate, it is unclear as she discontinued multiple medications abruptly at the same time. Given her significant response to Topiramate in the past we will have her resume Topiramate 50 mg once daily. Although, I have encouraged her to follow up with her other health care providers to discuss the need to restart any of her other medications that she has self discontinued. We discussed the risks of abrupt discontinuation of certain medications and emphasized that she should not do this without discussing with her health care providers first. Recent MRI Brain and orbit was stable with no changes, not concerning for optic neuritis or papilledema. She will follow up for her headaches in 3 months; sooner if needed.    Plan:  - Resume Topiramate 25 mg at bedtime x 1 week, then 50 mg at bedtime thereafter  - Referral to Urology for urinary incontinence/urgency   - Look up GoodRx for your prescriptions   - Follow up in 3 months (call to schedule)       Urinary incontinence  Pt reports urinary incontinence and urgency but denies any dysuria, fever, or frequency concerning for UTI  She reports she has seen Urology in the past but has not recently followed up.  She is requesting new referral to Urology; referral placed per patient request.        Diagnoses and all orders for this visit:    Pseudotumor cerebri  -     topiramate (Topamax) 50 MG  tablet; Take 1 tablet (50 mg total) by mouth daily at bedtime    Urinary incontinence  -     Ambulatory Referral to Urology; Future       I have spent a total time of 25 minutes in caring for this patient on the day of the visit/encounter including Diagnostic results, Prognosis, Risks and benefits of tx options, Instructions for management, Patient and family education, Importance of tx compliance, Risk factor reductions, Impressions, Documenting in the medical record, Reviewing / ordering tests, medicine, procedures  , and Obtaining or reviewing history  .      Subjective:    JAZMIN Multani is a 54 year old female who has followed with Dr. Harris, last seen by myself 6/13/2024. She is known to the practice for intractable headache, thought to be related to IIH in the setting of weight gain with worsening headaches and significant response to Topiramate. Review of record shows opening pressure recorded on prior 2018 LP was 23 cm H20. In the past she has had MRI brain with white matter lesions concerning for demyelination, although MS was ruled out.     When she was last seen she reported concern for optic neuritis reportedly found on Ophthalmologic exam. After her visit, we did obtain the records and there was no concern for optic neuritis on their exam. She did undergo STAT mri brain and orbit 6/13/2024  which revealed Similar appearance of the brain to prior study from approximately 1 year ago. Chronic white matter lesions. No acute intracranial pathology.  Normal appearance of the globes and orbits. No evidence of optic neuritis. She is now scheduled to see a Neurophthalmologist in September 2024. Due to her concerns about worsening speech and memory we did have her taper off of Topiramate 50 mg.     She returns today and states since discontinuing Topiramate her headaches are worse again. She states she has daily headaches, and is hearing swooshing in her ears. She has also had urinary incontinence and  "urgency. She has seen Urology in the past but has not followed up with them in a while. She is also having chronic diarrhea. She is losing her health insurance next month, because she is getting a divorce settlement which she then will not qualify for benefits. Since she was last seen she has abruptly discontinued multiple medications:    Xanax  Celexa  B12  Vitamin D  Folic acid  Toradol  Robaxin  Methotrexate  Sumatriptan   Topiramate  Toprol XL    Only taking metoprolol succinate 25 mg daily and Atorvastatin 10 mg at this time. She would like to resume Topiramate.    The following portions of the patient's history were reviewed and updated as appropriate: allergies, current medications, past family history, past medical history, past social history, past surgical history, and problem list.     Objective:    Blood pressure 118/82, pulse 74, temperature 98 °F (36.7 °C), temperature source Temporal, height 5' 6\" (1.676 m), weight 107 kg (235 lb 1.6 oz).    Neurological Exam  On neurological examination patient is alert, awake, oriented and in no distress. Speech is fluent without dysarthria or aphasia. She is able to rise easily without assistance from a seated position. Casual gait is slow, otherwise with normal stance and arm swing. She ambulates with a cane.     ROS:    Review of Systems   Constitutional:  Negative for appetite change, fatigue and fever.   HENT: Negative.  Negative for hearing loss, tinnitus, trouble swallowing and voice change.    Eyes: Negative.  Negative for photophobia, pain and visual disturbance.   Respiratory: Negative.  Negative for shortness of breath.    Cardiovascular: Negative.  Negative for palpitations.   Gastrointestinal: Negative.  Negative for nausea and vomiting.   Endocrine: Negative.  Negative for cold intolerance.   Genitourinary: Negative.  Negative for dysuria, frequency and urgency.   Musculoskeletal:  Negative for back pain, gait problem, myalgias, neck pain and neck " stiffness.   Skin: Negative.  Negative for rash.   Allergic/Immunologic: Negative.    Neurological:  Positive for headaches (daily). Negative for dizziness, tremors, seizures, syncope, facial asymmetry, speech difficulty, weakness, light-headedness and numbness.   Hematological: Negative.  Does not bruise/bleed easily.   Psychiatric/Behavioral: Negative.  Negative for confusion, hallucinations and sleep disturbance.    All other systems reviewed and are negative.    Reviewed ROS as entered by medical assistant.

## 2024-07-25 NOTE — ASSESSMENT & PLAN NOTE
Pt reports urinary incontinence and urgency but denies any dysuria, fever, or frequency concerning for UTI  She reports she has seen Urology in the past but has not recently followed up.  She is requesting new referral to Urology; referral placed per patient request.

## 2024-07-31 ENCOUNTER — TELEPHONE (OUTPATIENT)
Age: 54
End: 2024-07-31

## 2024-07-31 NOTE — TELEPHONE ENCOUNTER
New Patient    What is the reason for the patient's appointment?: Urinary incontinence     What office location does the patient prefer?: [place]    Does patient have Imaging/Lab Results:  No    Have patient records been requested?: N/A  If No, are the records showing in Epic: N/A      INSURANCE:   Do we accept the patient's insurance or is the patient Self-Pay?: Yes    Insurance Provider: [employer]  Plan Type/Number:   Member ID#: [id number]       HISTORY:   Has the patient had any previous Urologist(s)?: [facility]    Was the patient seen in the ED?: No    Has the patient had any outside testing done?: No    Does the patient have a personal history of cancer?: N/A    Pt scheduled for the next available of 9/16 with [facility] and added to the wait list.

## 2024-10-15 ENCOUNTER — TELEPHONE (OUTPATIENT)
Age: 54
End: 2024-10-15

## 2024-10-15 NOTE — TELEPHONE ENCOUNTER
Blanche from [facility] called re: our fax # to send a form re: dental procedure. Provided Blanche with our secure fax #.

## 2024-10-16 ENCOUNTER — CONSULT (OUTPATIENT)
Dept: UROLOGY | Facility: AMBULATORY SURGERY CENTER | Age: 54
End: 2024-10-16

## 2024-10-16 VITALS
BODY MASS INDEX: 37.77 KG/M2 | SYSTOLIC BLOOD PRESSURE: 120 MMHG | OXYGEN SATURATION: 98 % | RESPIRATION RATE: 20 BRPM | HEIGHT: 66 IN | HEART RATE: 86 BPM | WEIGHT: 235 LBS | DIASTOLIC BLOOD PRESSURE: 72 MMHG

## 2024-10-16 DIAGNOSIS — R31.29 MICROSCOPIC HEMATURIA: ICD-10-CM

## 2024-10-16 DIAGNOSIS — N39.46 MIXED URGE AND STRESS INCONTINENCE: Primary | ICD-10-CM

## 2024-10-16 DIAGNOSIS — R32 URINARY INCONTINENCE: ICD-10-CM

## 2024-10-16 LAB
BACTERIA UR QL AUTO: ABNORMAL /HPF
BILIRUB UR QL STRIP: NEGATIVE
CLARITY UR: CLEAR
COLOR UR: YELLOW
GLUCOSE UR STRIP-MCNC: NEGATIVE MG/DL
HGB UR QL STRIP.AUTO: NEGATIVE
KETONES UR STRIP-MCNC: NEGATIVE MG/DL
LEUKOCYTE ESTERASE UR QL STRIP: NEGATIVE
MUCOUS THREADS UR QL AUTO: ABNORMAL
NITRITE UR QL STRIP: NEGATIVE
NON-SQ EPI CELLS URNS QL MICRO: ABNORMAL /HPF
PH UR STRIP.AUTO: 6 [PH]
POST-VOID RESIDUAL VOLUME, ML POC: 50 ML
PROT UR STRIP-MCNC: ABNORMAL MG/DL
RBC #/AREA URNS AUTO: ABNORMAL /HPF
SL AMB  POCT GLUCOSE, UA: NORMAL
SL AMB LEUKOCYTE ESTERASE,UA: NORMAL
SL AMB POCT BILIRUBIN,UA: NORMAL
SL AMB POCT BLOOD,UA: NORMAL
SL AMB POCT CLARITY,UA: CLEAR
SL AMB POCT COLOR,UA: YELLOW
SL AMB POCT KETONES,UA: NORMAL
SL AMB POCT NITRITE,UA: NORMAL
SL AMB POCT PH,UA: 5
SL AMB POCT SPECIFIC GRAVITY,UA: 1.01
SL AMB POCT URINE PROTEIN: NORMAL
SL AMB POCT UROBILINOGEN: 0.2
SP GR UR STRIP.AUTO: 1.02 (ref 1–1.03)
UROBILINOGEN UR STRIP-ACNC: <2 MG/DL
WBC #/AREA URNS AUTO: ABNORMAL /HPF

## 2024-10-16 PROCEDURE — 81001 URINALYSIS AUTO W/SCOPE: CPT

## 2024-10-16 PROCEDURE — 87086 URINE CULTURE/COLONY COUNT: CPT

## 2024-10-16 RX ORDER — TROSPIUM CHLORIDE 20 MG/1
20 TABLET, FILM COATED ORAL 2 TIMES DAILY
Qty: 60 TABLET | Refills: 4 | Status: SHIPPED | OUTPATIENT
Start: 2024-10-16

## 2024-10-16 NOTE — ASSESSMENT & PLAN NOTE
Complaints of mixed urge and stress incontinence with bothersome urinary urgency and nocturia 3 times per night  PVR in office today 50 mL  Has been on oxybutynin over 5 years ago which did improve urinary symptoms  Referral for pelvic floor physical therapy  Initiate trospium 20 mg twice daily, side effect profile discussed  Continue with diet and lifestyle modifications such as limiting bladder irritants and stopping fluid consumption 2 hours prior to bedtime  Follow-up in 8 to 10 weeks   Cimetidine Counseling:  I discussed with the patient the risks of Cimetidine including but not limited to gynecomastia, headache, diarrhea, nausea, drowsiness, arrhythmias, pancreatitis, skin rashes, psychosis, bone marrow suppression and kidney toxicity.

## 2024-10-16 NOTE — ASSESSMENT & PLAN NOTE
Patient with known microscopic hematuria, last workup in 2017 at [facility], workup at that time was negative, she had a cystoscopy that was unremarkable  Strong family history of bladder cancer, mother was diagnosed and treated for bladder cancer, passed away from disease  UA in office today with large blood, will send for formal microscopy  CT renal protocol ordered for now, plan to review at next follow-up  Follow-up with physician in office for cystoscopy to complete hematuria workup

## 2024-10-16 NOTE — PROGRESS NOTES
Assessment and plan:     Mixed urge and stress incontinence  Complaints of mixed urge and stress incontinence with bothersome urinary urgency and nocturia 3 times per night  PVR in office today 50 mL  Has been on oxybutynin over 5 years ago which did improve urinary symptoms  Referral for pelvic floor physical therapy  Initiate trospium 20 mg twice daily, side effect profile discussed  Continue with diet and lifestyle modifications such as limiting bladder irritants and stopping fluid consumption 2 hours prior to bedtime  Follow-up in 8 to 10 weeks    Microscopic hematuria  Patient with known microscopic hematuria, last workup in 2017 at [facility], workup at that time was negative, she had a cystoscopy that was unremarkable  Strong family history of bladder cancer, mother was diagnosed and treated for bladder cancer, passed away from disease  UA in office today with large blood, will send for formal microscopy  CT renal protocol ordered for now, plan to review at next follow-up  Follow-up with physician in office for cystoscopy to complete hematuria workup    History of Present Illness     Elena Multani is a 54 y.o. female who presents today to the office as a new patient for further evaluation of microscopic hematuria and mixed urge and stress incontinence.  She states that this has been ongoing for a few years but has progressively gotten worse.      She states that most recently she has had urgency with urge incontinence and not being able to control leakage.  She also states that she leaks urine when she coughs, laughs, sneezes.  She states that she does not have to wear any pads or diapers throughout the day.  She denies any dysuria, hematuria, flank pain.  She states that she drinks about 2 cups of coffee in the morning and stays well-hydrated throughout the day with water.  She states that she was on oxybutynin over 5 years ago when she was seen at [facility] urology.  She states that she does  "remember that the medication did overall help her symptoms but she stopped taking it a few years ago.    She has ongoing issues with pain control. several times throughout the night she states because of the ongoing pain issues she does have to get up.    She also states that she feels as though she has been having pelvic cramps.  She did have a hysterectomy at the age of 37.    She has a history of microscopic hematuria.  This was worked up in full at [facility] urology in 2017.  She had an unremarkable CT scan and cystoscopy.  She does have a strong family history of bladder cancer in her mother.  She does smoke 1 pack of cigarettes per day.    Laboratory     Lab Results   Component Value Date    BUN 14 07/25/2024    CREATININE 0.65 07/25/2024       No components found for: \"GFR\"    Lab Results   Component Value Date    GLUCOSE 105 (H) 12/22/2016    CALCIUM 9.5 07/25/2024     12/22/2016    K 4.2 07/25/2024    CO2 23 07/25/2024     07/25/2024       Lab Results   Component Value Date    WBC 0-5 07/13/2017    HGB 15.1 12/22/2016    HCT 43.8 12/22/2016    MCV 92 12/22/2016     11/07/2018       No results found for: \"PSA\"    No results found for this or any previous visit (from the past 1 hour(s)).    Review of Systems     Review of Systems   Constitutional:  Negative for chills and fever.   Respiratory: Negative.  Negative for cough and shortness of breath.    Cardiovascular:  Negative for chest pain and leg swelling.   Genitourinary:  Positive for difficulty urinating, frequency, pelvic pain and urgency. Negative for dyspareunia, dysuria, flank pain, hematuria, menstrual problem, vaginal bleeding, vaginal discharge and vaginal pain.   Skin:  Negative for rash.   Neurological: Negative.    Hematological:  Negative for adenopathy. Does not bruise/bleed easily.       Allergies     Allergies   Allergen Reactions    Morphine GI Intolerance and Hives     Vomiting and migraine headache      Other Rash " "    Medical tape    Nitrofurantoin Hives       Physical Exam     Physical Exam  Vitals reviewed.   Constitutional:       Appearance: Normal appearance.   HENT:      Head: Normocephalic and atraumatic.   Eyes:      Pupils: Pupils are equal, round, and reactive to light.   Cardiovascular:      Rate and Rhythm: Normal rate.   Pulmonary:      Effort: Pulmonary effort is normal.   Musculoskeletal:      Cervical back: Normal range of motion.   Skin:     General: Skin is warm and dry.   Neurological:      General: No focal deficit present.      Mental Status: She is alert and oriented to person, place, and time. Mental status is at baseline.   Psychiatric:         Mood and Affect: Mood normal.         Behavior: Behavior normal.         Thought Content: Thought content normal.         Judgment: Judgment normal.         Vital Signs     Vitals:    10/16/24 0845   BP: 120/72   BP Location: Left arm   Patient Position: Sitting   Cuff Size: Adult   Pulse: 86   Resp: 20   SpO2: 98%   Weight: 107 kg (235 lb)   Height: 5' 6\" (1.676 m)       Current Medications       Current Outpatient Medications:     atorvastatin (LIPITOR) 10 mg tablet, Take 10 mg by mouth daily, Disp: , Rfl:     metoprolol succinate (TOPROL-XL) 25 mg 24 hr tablet, Take 25 mg by mouth daily, Disp: , Rfl:     topiramate (Topamax) 50 MG tablet, Take 1 tablet (50 mg total) by mouth daily at bedtime, Disp: 90 tablet, Rfl: 3    trospium chloride (SANCTURA) 20 mg tablet, Take 1 tablet (20 mg total) by mouth 2 (two) times a day, Disp: 60 tablet, Rfl: 4    ALPRAZolam (XANAX) 0.25 mg tablet, Take 0.25 mg by mouth daily as needed (Patient not taking: Reported on 7/25/2024), Disp: , Rfl:     amoxicillin (AMOXIL) 500 mg capsule, TAKE 1 CAPSULE (500 MG TOTAL) BY MOUTH 3 (THREE) TIMES A DAY FOR 7 DAYS. (Patient not taking: Reported on 7/25/2024), Disp: , Rfl:     citalopram (CeleXA) 20 mg tablet, Take 20 mg by mouth daily (Patient not taking: Reported on 7/25/2024), Disp: , " Rfl:     clobetasol (TEMOVATE) 0.05 % cream, Apply topically 2 (two) times a day as needed (Patient not taking: Reported on 7/25/2024), Disp: , Rfl:     Cobalamine Combinations (B-12) 1000-400 MCG SUBL, Inject into a muscle every 30 (thirty) days (Patient not taking: Reported on 10/16/2024), Disp: , Rfl:     Cyanocobalamin 1000 MCG/ML KIT, Inject 1,000 mcg into a muscle every 30 (thirty) days (Patient not taking: Reported on 7/25/2024), Disp: , Rfl:     ergocalciferol (VITAMIN D2) 50,000 units, TAKE 1 CAPSULE (50,000 UNITS TOTAL) BY MOUTH ONCE A WEEK (Patient not taking: Reported on 7/25/2024), Disp: 4 capsule, Rfl: 0    folic acid (FOLVITE) 1 mg tablet, Take 1 mg by mouth daily (Patient not taking: Reported on 7/25/2024), Disp: , Rfl:     ketorolac (TORADOL) 10 mg tablet, Take 1 tablet (10 mg total) by mouth every 6 (six) hours as needed for moderate pain (Patient not taking: Reported on 7/25/2024), Disp: 10 tablet, Rfl: 5    methocarbamol (ROBAXIN) 750 mg tablet, TAKE 1 TABLET (750 MG TOTAL) BY MOUTH EVERY 8 (EIGHT) HOURS (Patient not taking: Reported on 7/25/2024), Disp: 90 tablet, Rfl: 1    methotrexate 2.5 MG tablet, Take 2.5 mg by mouth once a week (Patient not taking: Reported on 7/25/2024), Disp: , Rfl:     SUMAtriptan (IMITREX) 50 mg tablet, Take 1 tablet (50 mg total) by mouth once as needed for migraine for up to 1 dose (Patient not taking: Reported on 7/25/2024), Disp: 9 tablet, Rfl: 2    Toprol XL 25 MG 24 hr tablet, , Disp: , Rfl:     Active Problems     Patient Active Problem List   Diagnosis    Other abnormal findings on diagnostic imaging of central nervous system    Weakness of both lower extremities    Dysphagia    Numbness in both legs    Other spondylosis with radiculopathy, lumbar region    Muscle spasms of both lower extremities    Pernicious anemia    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    Chronic neck pain    Numbness and tingling in left arm    Real time reverse transcriptase PCR positive  for COVID-19 virus    Dizziness and giddiness    Persistent postural-perceptual dizziness    Paraparesis of both lower limbs (HCC)    Cervical radiculopathy at C7    CNS demyelination (HCC)    Vestibular dysfunction    Transient vision disturbance of both eyes    Pseudotumor cerebri    Optic neuritis    Mixed urge and stress incontinence    Microscopic hematuria       Past Medical History     Past Medical History:   Diagnosis Date    Atrophy of vagina     Avascular necrosis (HCC)     Cluster headache     Depression     Difficulty walking 2years    Easy bruisability     Endometriosis     Factor V Leiden mutation (HCC)     Fibromyalgia, primary 2yrs    HL (hearing loss) Past 8 months    Hx of migraines     Hypertension     Lichen sclerosus     Memory loss Past couple years    Migraine Migraines past 2 years    Ovarian cyst     Pelvic pain     Periodic limb movement disorder     Peripheral neuropathy     Psoriasis     Vision loss 2years       Surgical History     Past Surgical History:   Procedure Laterality Date    ANTERIOR FUSION CERVICAL SPINE      ACDF C4-C6,  performed by Dr. Conde    APPENDECTOMY       SECTION      FL LUMBAR PUNCTURE DIAGNOSTIC  2018    HYSTERECTOMY      LAPAROSCOPIC OVARIAN CYSTECTOMY         Family History     Family History   Problem Relation Age of Onset    Diabetes Mother     Hypertension Mother     Atrial fibrillation Mother     Migraines Mother     Stroke Mother     No Known Problems Father     Stroke Paternal Grandfather        Social History     Social History     Social History     Tobacco Use   Smoking Status Every Day    Current packs/day: 1.00    Average packs/day: 1 pack/day for 30.0 years (30.0 ttl pk-yrs)    Types: Cigarettes   Smokeless Tobacco Current       Past Surgical History:   Procedure Laterality Date    ANTERIOR FUSION CERVICAL SPINE      ACDF C4-C62012 performed by Dr. Conde    APPENDECTOMY       SECTION      FL LUMBAR PUNCTURE  DIAGNOSTIC  11/07/2018    HYSTERECTOMY      LAPAROSCOPIC OVARIAN CYSTECTOMY           The following portions of the patient's history were reviewed and updated as appropriate: allergies, current medications, past family history, past medical history, past social history, past surgical history and problem list    Please note :  Voice dictation software has been used to create this document.  There may be inadvertent transcription errors.    CHARITO Colmenares

## 2024-10-17 ENCOUNTER — TELEPHONE (OUTPATIENT)
Dept: NEUROLOGY | Facility: CLINIC | Age: 54
End: 2024-10-17

## 2024-10-17 LAB — BACTERIA UR CULT: NORMAL

## 2024-10-17 NOTE — TELEPHONE ENCOUNTER
Received medical clearance for dental treatment form.    Scanned Unsigned Medical Clearance for Dental Treatment Form into media.

## 2024-10-22 ENCOUNTER — HOSPITAL ENCOUNTER (OUTPATIENT)
Dept: RADIOLOGY | Facility: HOSPITAL | Age: 54
Discharge: HOME | End: 2024-10-22
Payer: COMMERCIAL

## 2024-10-22 DIAGNOSIS — R31.29 MICROSCOPIC HEMATURIA: ICD-10-CM

## 2024-10-22 PROCEDURE — 74178 CT ABD&PLV WO CNTR FLWD CNTR: CPT

## 2024-10-22 RX ADMIN — IOHEXOL 75 ML: 350 INJECTION, SOLUTION INTRAVENOUS at 07:49

## 2024-10-23 ENCOUNTER — TELEPHONE (OUTPATIENT)
Dept: UROLOGY | Facility: AMBULATORY SURGERY CENTER | Age: 54
End: 2024-10-23

## 2024-10-23 NOTE — TELEPHONE ENCOUNTER
----- Message from CHARITO Colmenares sent at 10/23/2024 10:49 AM EDT -----  Please call Elena and let her know that her CT scan looks good.  There are no concerns within her kidneys or bladder.    Spoke with pt and advised of results. She was in verbal understanding with no questions at this time.

## 2024-11-04 ENCOUNTER — TELEPHONE (OUTPATIENT)
Dept: NEUROLOGY | Facility: CLINIC | Age: 54
End: 2024-11-04

## 2024-11-04 NOTE — TELEPHONE ENCOUNTER
Faxed signed Medical Clearance for Dental Treatment Form to [facility] [phone] and scanned into media.     Also sent [employer] that signed/faxed form was in her media.

## 2024-12-16 RX ORDER — CHLORHEXIDINE GLUCONATE ORAL RINSE 1.2 MG/ML
15 SOLUTION DENTAL 2 TIMES DAILY
COMMUNITY
Start: 2024-10-01

## 2024-12-16 RX ORDER — BUPRENORPHINE HYDROCHLORIDE 150 UG/1
1 FILM, SOLUBLE BUCCAL 2 TIMES DAILY
COMMUNITY
Start: 2024-09-12

## 2024-12-19 ENCOUNTER — OFFICE VISIT (OUTPATIENT)
Dept: UROLOGY | Facility: AMBULATORY SURGERY CENTER | Age: 54
End: 2024-12-19

## 2024-12-19 ENCOUNTER — TELEPHONE (OUTPATIENT)
Age: 54
End: 2024-12-19

## 2024-12-19 VITALS
DIASTOLIC BLOOD PRESSURE: 72 MMHG | SYSTOLIC BLOOD PRESSURE: 156 MMHG | OXYGEN SATURATION: 96 % | HEART RATE: 92 BPM | HEIGHT: 66 IN | WEIGHT: 235 LBS | BODY MASS INDEX: 37.77 KG/M2

## 2024-12-19 DIAGNOSIS — R32 URINARY INCONTINENCE: ICD-10-CM

## 2024-12-19 DIAGNOSIS — N39.46 MIXED URGE AND STRESS INCONTINENCE: ICD-10-CM

## 2024-12-19 DIAGNOSIS — E66.01 OBESITY, MORBID (HCC): ICD-10-CM

## 2024-12-19 DIAGNOSIS — R31.29 MICROSCOPIC HEMATURIA: ICD-10-CM

## 2024-12-19 DIAGNOSIS — N39.46 MIXED URGE AND STRESS INCONTINENCE: Primary | ICD-10-CM

## 2024-12-19 DIAGNOSIS — N39.41 URGE INCONTINENCE OF URINE: Primary | ICD-10-CM

## 2024-12-19 LAB — POST-VOID RESIDUAL VOLUME, ML POC: 28 ML

## 2024-12-19 RX ORDER — TROSPIUM CHLORIDE 20 MG/1
20 TABLET, FILM COATED ORAL 2 TIMES DAILY
Qty: 180 TABLET | Refills: 3 | Status: SHIPPED | OUTPATIENT
Start: 2024-12-19 | End: 2024-12-23 | Stop reason: ALTCHOICE

## 2024-12-19 NOTE — TELEPHONE ENCOUNTER
Can you please call Elena and let her know that I received a notification that her insurance is no longer going to be covering trospium.  They state that they would cover Gemtesa which I would be able to call into her pharmacy if she is okay with starting this medication.  Is very similar to trospium and it is only once daily.  Biggest side effects are increased heart rate and blood pressure.  She has had no issues with her blood pressure or heart rate at her last few office appointments.

## 2024-12-19 NOTE — TELEPHONE ENCOUNTER
[facility] pharmacy called stating her insurance is allowing a 1 month transition approval for trospium. After this month, she will require a prior authorization. Please advise.

## 2024-12-19 NOTE — PROGRESS NOTES
Name: Elena Multani      : 1970      MRN: [id number]  Encounter Provider: CHARITO Colmenares  Encounter Date: 2024   Encounter department: [facility]OLOGY [place]  :  Assessment & Plan  Mixed urge and stress incontinence  Complaints of mixed urge and stress incontinence with bothersome urinary urgency and nocturia 3 times per night  PVR in office today 28 mL  Referral for pelvic floor physical therapy, patient did not attend any sessions  Continue trospium 20 mg twice daily, refills provided  Continue with diet and lifestyle modifications such as limiting bladder irritants and stopping fluid consumption 2 hours prior to bedtime  Follow-up in 1 year today to the office for follow-up of mixed urge and stress incontinence       Microscopic hematuria  Patient with known microscopic hematuria, last workup in  at [facility], workup at that time was negative, she had a cystoscopy that was unremarkable  Strong family history of bladder cancer, mother was diagnosed and treated for bladder cancer, passed away from disease  Microscopic urinalysis from 10/16/2024 with 1-2 RBCs  CT renal protocol on 10/22/2024 with no abnormalities within the urinary tract  I provided the patient with reassurance today that her CT renal protocol looks good and there is nothing noted on the imaging that is concerning, her last urinalysis did not demonstrate any significant amount of blood in the urine, at this time I would not recommend proceeding with any cystoscopy, certainly if there is ever any clinically significant amount of RBCs or gross hematuria would plan to proceed with cystoscopy for further evaluation         Obesity, morbid (HCC)  Continue to follow with PCP           History of Present Illness   Elena Multani is a 54 y.o. female who presents today to the office for follow-up of mixed urge and stress incontinence and microscopic hematuria.  She was last seen in 2024.  At that time she  "was discontinued off of oxybutynin and initiated on trospium 20 mg twice daily.  She states that since initiating the medication she has noticed a significant improvement in her urinary symptoms and she is very satisfied with this.  She states she did not go to pelvic floor physical therapy due to the significant improvement.  She denies any urinary/urological complaints today in the office.  She denies any gross hematuria.    Review of Systems   Constitutional:  Negative for chills and fever.   Respiratory: Negative.  Negative for cough and shortness of breath.    Cardiovascular:  Negative for chest pain and leg swelling.   Genitourinary:  Negative for dyspareunia, dysuria, flank pain, frequency, hematuria, menstrual problem, pelvic pain, urgency, vaginal bleeding, vaginal discharge and vaginal pain.   Skin:  Negative for rash.   Neurological: Negative.    Hematological:  Negative for adenopathy. Does not bruise/bleed easily.          Objective   /72 (BP Location: Left arm, Patient Position: Sitting, Cuff Size: Standard)   Pulse 92   Ht 5' 6\" (1.676 m)   Wt 107 kg (235 lb)   LMP  (LMP Unknown)   SpO2 96%   BMI 37.93 kg/m²     Physical Exam  Vitals reviewed.   Constitutional:       Appearance: Normal appearance.   HENT:      Head: Normocephalic and atraumatic.   Eyes:      Pupils: Pupils are equal, round, and reactive to light.   Cardiovascular:      Rate and Rhythm: Normal rate.   Pulmonary:      Effort: Pulmonary effort is normal.   Musculoskeletal:      Cervical back: Normal range of motion.   Skin:     General: Skin is warm and dry.   Neurological:      General: No focal deficit present.      Mental Status: She is alert and oriented to person, place, and time. Mental status is at baseline.   Psychiatric:         Mood and Affect: Mood normal.         Behavior: Behavior normal.         Thought Content: Thought content normal.         Judgment: Judgment normal.          Results  No results found for: " "\"PSA\"  Lab Results   Component Value Date    GLUCOSE 105 (H) 12/22/2016    CALCIUM 9.5 07/25/2024     12/22/2016    K 4.2 07/25/2024    CO2 23 07/25/2024     07/25/2024    BUN 14 07/25/2024    CREATININE 0.65 07/25/2024     Lab Results   Component Value Date    WBC 0-5 07/13/2017    HGB 15.1 12/22/2016    HCT 43.8 12/22/2016    MCV 92 12/22/2016     11/07/2018       Office Urine Dip  Recent Results (from the past hour)   POCT Measure PVR    Collection Time: 12/19/24  8:03 AM   Result Value Ref Range    POST-VOID RESIDUAL VOLUME, ML POC 28 mL   ]      "

## 2024-12-19 NOTE — TELEPHONE ENCOUNTER
PA for TROSPIUM 20 MG BIDSUBMITTED to [facility]    via    [x]CMM-KEY: [id number]      [x]PA sent as URGENT    All office notes, labs and other pertaining documents and studies sent. Clinical questions answered. Awaiting determination from insurance company.     Turnaround time for your insurance to make a decision on your Prior Authorization can take 7-21 business days.

## 2024-12-19 NOTE — TELEPHONE ENCOUNTER
PA for TROSPIUM 20 MG BID DENIED    Reason:(Screenshot if applicable)    Search of chart does not show trial and failure of Solifencin or Gemtesa    Message sent to office clinical pool Yes    Denial letter scanned into Media Yes        **Please follow up with your patient regarding denial and next steps**

## 2024-12-19 NOTE — ASSESSMENT & PLAN NOTE
Patient with known microscopic hematuria, last workup in 2017 at [facility], workup at that time was negative, she had a cystoscopy that was unremarkable  Strong family history of bladder cancer, mother was diagnosed and treated for bladder cancer, passed away from disease  Microscopic urinalysis from 10/16/2024 with 1-2 RBCs  CT renal protocol on 10/22/2024 with no abnormalities within the urinary tract  I provided the patient with reassurance today that her CT renal protocol looks good and there is nothing noted on the imaging that is concerning, her last urinalysis did not demonstrate any significant amount of blood in the urine, at this time I would not recommend proceeding with any cystoscopy, certainly if there is ever any clinically significant amount of RBCs or gross hematuria would plan to proceed with cystoscopy for further evaluation

## 2024-12-19 NOTE — ASSESSMENT & PLAN NOTE
Complaints of mixed urge and stress incontinence with bothersome urinary urgency and nocturia 3 times per night  PVR in office today 28 mL  Referral for pelvic floor physical therapy, patient did not attend any sessions  Continue trospium 20 mg twice daily, refills provided  Continue with diet and lifestyle modifications such as limiting bladder irritants and stopping fluid consumption 2 hours prior to bedtime  Follow-up in 1 year today to the office for follow-up of mixed urge and stress incontinence

## 2024-12-20 NOTE — TELEPHONE ENCOUNTER
We can try another medication called Vesicare which also looks like is covered by her insurance.  Side effects of this medication are dry mouth, dry eye, and constipation.

## 2024-12-20 NOTE — TELEPHONE ENCOUNTER
Pt returning call to clinical regarding medication.     Pt is concerned about side effects of Gemtesa and sts she has had blood pressure issues in the past. Pt is requesting a call back from clinical to discuss her concerns. Please review.    Pt call back- [phone]

## 2024-12-20 NOTE — TELEPHONE ENCOUNTER
Lm for Elena that   that her insurance is no longer going to be covering trospium.  They state that they would cover Gemtesa which can be called into her pharmacy if she is okay with starting this medication.  Is very similar to trospium and it is only once daily.  Biggest side effects are increased heart rate and blood pressure.  She has had no issues with her blood pressure or heart rate at her last few office appointments. Please call back and confirm

## 2024-12-23 RX ORDER — SOLIFENACIN SUCCINATE 10 MG/1
10 TABLET, FILM COATED ORAL DAILY
Qty: 30 TABLET | Refills: 4 | Status: SHIPPED | OUTPATIENT
Start: 2024-12-23

## (undated) DEVICE — ***USE 143206***SYRINGE BULB

## (undated) DEVICE — GLOVE SZ 8.5 PROTEXIS PI

## (undated) DEVICE — TIP SUCTION YANKAUER

## (undated) DEVICE — MAT ABSORBANT SUPERMAT 50 X 56

## (undated) DEVICE — KIT SHOULDER STABILIZATION SPIDER

## (undated) DEVICE — GOWN SURGICAL REINFORCED X-LAR

## (undated) DEVICE — DRAPE LARGE REVERSE FOLD

## (undated) DEVICE — FACEMASK FOR SHOULDER POSITONER

## (undated) DEVICE — NEEDLE HALF CIRCLE TAPER MED GALLES FASCIA

## (undated) DEVICE — BLANKET UPPER BODY BAIR HUGGER

## (undated) DEVICE — DRAPE HALF STERILE

## (undated) DEVICE — ***USE 124736***SYRINGE TOOMEY

## (undated) DEVICE — SUTURETAPE 1.3MM SUTURE W NEEDLE

## (undated) DEVICE — SPONGE LAP DISPOSABLE 18X18

## (undated) DEVICE — HEMOSTAT SURGICEL ABSORBABLE 4 X 8

## (undated) DEVICE — PAD GROUND ELECTROSURGICAL W/CORD

## (undated) DEVICE — BLADE SAGITTAL #376 HEAVY WIDE

## (undated) DEVICE — SOLN IRRIG STER WATER 1000ML

## (undated) DEVICE — SUTURE ETHIBOND 5 MB47G CCS 4PK

## (undated) DEVICE — SUTURE MONOCRYL 3-0  Y497G

## (undated) DEVICE — BLADE SCALPEL #15

## (undated) DEVICE — TIP SUCTION FRAZIER 12FR

## (undated) DEVICE — SURGICEL 2X14

## (undated) DEVICE — DRESSING TEGADERM 4X4 3/4

## (undated) DEVICE — HOOD FLYTE SURGICOOL

## (undated) DEVICE — MANIFOLD FOUR PORT NEPTUNE

## (undated) DEVICE — DRAPE STERI TOWEL PLASTIC 18X24

## (undated) DEVICE — VEST STERILE

## (undated) DEVICE — APPLICATOR CHLORAPREP 26ML ORANGE TINT

## (undated) DEVICE — DRESSING SPONGE ALL GAUZE 4X4 STER 10PK

## (undated) DEVICE — SUTURE 2N 30" X 1MM HC-5 CTY WH

## (undated) DEVICE — MIX-EVAC HIGH VACUUM KIT

## (undated) DEVICE — SET HANDPIECE INTERPULSE

## (undated) DEVICE — ADHESIVE SKIN DERMABOND ADVANCED 0.7ML

## (undated) DEVICE — PACK MLHS SHOULDER STANDARD

## (undated) DEVICE — SOLN IRRIG .9%SOD 3L

## (undated) DEVICE — DRAPE POUCH IRRIGATION

## (undated) DEVICE — DRAPE IOBAN

## (undated) DEVICE — COVER LIGHTHANDLE

## (undated) DEVICE — PENCIL ESU HANDSWITCHING W/HOL

## (undated) DEVICE — Device

## (undated) DEVICE — DRAPE-U-1015

## (undated) DEVICE — SUTURE VICRYL PLUS 2-0 VCP869H

## (undated) DEVICE — DRESSING ABD STER LGE 8X10

## (undated) DEVICE — ***USE 57698*** SLEEVE FLOWTRON DVT CALF SINGLE USE

## (undated) DEVICE — DRAPE TOWEL 17X23 NON-STERILE

## (undated) DEVICE — GLOVE SZ 8 LINER PROTEXIS PI BL

## (undated) DEVICE — GUIDE WIRE

## (undated) DEVICE — NEEDLE SPINAL 18G X3-1-2IN